# Patient Record
Sex: FEMALE | Race: WHITE | Employment: OTHER | URBAN - METROPOLITAN AREA
[De-identification: names, ages, dates, MRNs, and addresses within clinical notes are randomized per-mention and may not be internally consistent; named-entity substitution may affect disease eponyms.]

---

## 2017-01-04 ENCOUNTER — OFFICE VISIT (OUTPATIENT)
Dept: INTERNAL MEDICINE CLINIC | Facility: CLINIC | Age: 82
End: 2017-01-04

## 2017-01-04 VITALS
BODY MASS INDEX: 23 KG/M2 | DIASTOLIC BLOOD PRESSURE: 86 MMHG | HEART RATE: 84 BPM | WEIGHT: 132 LBS | SYSTOLIC BLOOD PRESSURE: 170 MMHG | TEMPERATURE: 98 F | OXYGEN SATURATION: 97 %

## 2017-01-04 DIAGNOSIS — I25.10 ASHD (ARTERIOSCLEROTIC HEART DISEASE): Primary | ICD-10-CM

## 2017-01-04 DIAGNOSIS — Z79.4 TYPE 2 DIABETES MELLITUS WITHOUT COMPLICATION, WITH LONG-TERM CURRENT USE OF INSULIN (HCC): ICD-10-CM

## 2017-01-04 DIAGNOSIS — Z95.1 S/P CABG X 3: ICD-10-CM

## 2017-01-04 DIAGNOSIS — D64.9 ANEMIA, UNSPECIFIED TYPE: ICD-10-CM

## 2017-01-04 DIAGNOSIS — E11.9 TYPE 2 DIABETES MELLITUS WITHOUT COMPLICATION, WITH LONG-TERM CURRENT USE OF INSULIN (HCC): ICD-10-CM

## 2017-01-04 DIAGNOSIS — E78.00 HYPERCHOLESTEREMIA: ICD-10-CM

## 2017-01-04 DIAGNOSIS — K21.9 GASTROESOPHAGEAL REFLUX DISEASE WITHOUT ESOPHAGITIS: ICD-10-CM

## 2017-01-04 DIAGNOSIS — I10 ESSENTIAL HYPERTENSION: ICD-10-CM

## 2017-01-04 DIAGNOSIS — M15.9 PRIMARY OSTEOARTHRITIS INVOLVING MULTIPLE JOINTS: ICD-10-CM

## 2017-01-04 DIAGNOSIS — R53.83 FATIGUE, UNSPECIFIED TYPE: ICD-10-CM

## 2017-01-04 PROCEDURE — G0463 HOSPITAL OUTPT CLINIC VISIT: HCPCS | Performed by: INTERNAL MEDICINE

## 2017-01-04 PROCEDURE — 99214 OFFICE O/P EST MOD 30 MIN: CPT | Performed by: INTERNAL MEDICINE

## 2017-01-04 RX ORDER — GLIMEPIRIDE 4 MG/1
4 TABLET ORAL
Qty: 30 TABLET | Refills: 11 | Status: SHIPPED | OUTPATIENT
Start: 2017-01-04 | End: 2017-02-03 | Stop reason: DRUGHIGH

## 2017-01-04 RX ORDER — MELATONIN
325
Qty: 30 TABLET | Refills: 3 | Status: SHIPPED | OUTPATIENT
Start: 2017-01-04 | End: 2017-02-03

## 2017-01-04 NOTE — PATIENT INSTRUCTIONS
1.  Patient is to continue her current diet, medications and activity. 2.  Patient is to continue her lisinopril and Cardizem as recommended by Dr. Mena Reddy. 3.  Patient is to decrease her ferrous sulfate to 1 tablet daily.   4.  Patient to continue to inc

## 2017-01-04 NOTE — PROGRESS NOTES
Laine Luna is a 80year old female. Patient presents with:  Post-Op: 1 mo f/u  Ashd  Diabetes    HPI:   Patient presents with:  Post-Op: 1 mo f/u  Ashd  Diabetes    Patient is gradually improving following her recent aorto-coronary bypass operation. Rfl: 5   LEVOTHYROXINE SODIUM 50 MCG Oral Tab TAKE 1 TABLET (50MCG) BY MOUTH EVERY DAY Disp: 90 tablet Rfl: 3   Bepotastine Besilate 1.5 % Ophthalmic Solution Apply 1 drop to eye as needed.    Disp:  Rfl:    Blood Gluc Meter Disp-Strips Does not apply Devic bleeding 2014   • Neuropathy 06-   • Onychogryphosis 06-   • Esophageal reflux    • Heart attack ([facility]    • High cholesterol    • High blood pressure       Social History:    Smoking Status: Never Smoker                      Alcohol Use: No long-term current use of insulin (HCC)  Doing well.  CPM.  Patient's recent FBS was 180. Her FBS from earlier in the month was 92 with a hemoglobin A1c of 6.0. She is currently taking 10 units of Lantus insulin and 4 mg of glimepiride.   We will continue

## 2017-01-05 ENCOUNTER — TELEPHONE (OUTPATIENT)
Dept: INTERNAL MEDICINE CLINIC | Facility: CLINIC | Age: 82
End: 2017-01-05

## 2017-01-05 NOTE — TELEPHONE ENCOUNTER
Calling for blood pressure parameters from Dr Carroll Double - when does he want to be notified?   Her BP today is diastolic is 90 - systolic  625     Danelle at [facility] [phone]

## 2017-01-06 NOTE — TELEPHONE ENCOUNTER
I left a message for the visiting nurse, Erwin Alcazar. I have recommended that she notify me if the blood pressure is 175 or 961 or above systolic and 70/110 diastolic.

## 2017-01-18 ENCOUNTER — TELEPHONE (OUTPATIENT)
Dept: INTERNAL MEDICINE CLINIC | Facility: CLINIC | Age: 82
End: 2017-01-18

## 2017-01-18 NOTE — TELEPHONE ENCOUNTER
To DR ROGELIO NOVA  Pt states she was distracted when giving insulin this am - looks like she only got 3 units- because the pen was empty  rec that she take 7 more units from a new pen  Eat breakfast and take other meds as directed   Any problems call the ofc

## 2017-01-18 NOTE — TELEPHONE ENCOUNTER
Pt sent note for Dr Vinh Crowell review. Note includes medication notes following pt appt with Dr Tila Kingsley.   Pt has questions about some changes  Note placed in Dr Miles Christy in-box  Tasked to nursing

## 2017-01-19 RX ORDER — INSULIN GLARGINE 100 [IU]/ML
INJECTION, SOLUTION SUBCUTANEOUS
Qty: 15 ML | Refills: 3 | Status: SHIPPED | OUTPATIENT
Start: 2017-01-19 | End: 2018-04-28

## 2017-01-19 NOTE — TELEPHONE ENCOUNTER
I attempted to call the patient back twice this evening. The phone rang with no answer. I will attempt to call the patient tomorrow.

## 2017-01-20 NOTE — TELEPHONE ENCOUNTER
Discussed with patient. I reviewed the note that she sent me. She has discussed this with Brigitte Elliott, our nurse. Patient feels that she has her medications straightened out of her talking to Brigitte Elliott. Patient will see me in approximately 2 weeks.   She will call

## 2017-01-27 RX ORDER — LEVOTHYROXINE SODIUM 0.05 MG/1
TABLET ORAL
Qty: 90 TABLET | Refills: 3 | Status: SHIPPED | OUTPATIENT
Start: 2017-01-27 | End: 2017-04-24

## 2017-01-27 NOTE — TELEPHONE ENCOUNTER
Pt needs a refill Levothyroxine 50 mcg, pt is concerned she only has 5 pills left, send to [place].     Tasked to rx

## 2017-01-28 NOTE — TELEPHONE ENCOUNTER
Noted. Patient's TSH was 4.67 and October 11, 2016. I have sent a refill for the patient's Synthroid to her pharmacy as requested. I have given her 90 pills with 3 refills.

## 2017-01-30 ENCOUNTER — LAB ENCOUNTER (OUTPATIENT)
Dept: LAB | Age: 82
End: 2017-01-30
Attending: INTERNAL MEDICINE
Payer: MEDICARE

## 2017-01-30 DIAGNOSIS — E11.9 TYPE 2 DIABETES MELLITUS WITHOUT COMPLICATION, WITH LONG-TERM CURRENT USE OF INSULIN (HCC): ICD-10-CM

## 2017-01-30 DIAGNOSIS — D64.9 ANEMIA, UNSPECIFIED TYPE: ICD-10-CM

## 2017-01-30 DIAGNOSIS — E78.00 HYPERCHOLESTEREMIA: ICD-10-CM

## 2017-01-30 DIAGNOSIS — Z79.4 TYPE 2 DIABETES MELLITUS WITHOUT COMPLICATION, WITH LONG-TERM CURRENT USE OF INSULIN (HCC): ICD-10-CM

## 2017-01-30 DIAGNOSIS — R53.83 FATIGUE, UNSPECIFIED TYPE: ICD-10-CM

## 2017-01-30 LAB
ALT SERPL-CCNC: 16 U/L (ref 14–54)
ANION GAP SERPL CALC-SCNC: 7 MMOL/L (ref 0–18)
AST SERPL-CCNC: 24 U/L (ref 15–41)
BASOPHILS # BLD: 0.1 K/UL (ref 0–0.2)
BASOPHILS NFR BLD: 1 %
BUN SERPL-MCNC: 18 MG/DL (ref 8–20)
BUN/CREAT SERPL: 18 (ref 10–20)
CALCIUM SERPL-MCNC: 9.3 MG/DL (ref 8.5–10.5)
CHLORIDE SERPL-SCNC: 105 MMOL/L (ref 95–110)
CHOLEST SERPL-MCNC: 177 MG/DL (ref 110–200)
CO2 SERPL-SCNC: 32 MMOL/L (ref 22–32)
CREAT SERPL-MCNC: 1 MG/DL (ref 0.5–1.5)
EOSINOPHIL # BLD: 0.4 K/UL (ref 0–0.7)
EOSINOPHIL NFR BLD: 8 %
ERYTHROCYTE [DISTWIDTH] IN BLOOD BY AUTOMATED COUNT: 14.1 % (ref 11–15)
GLUCOSE SERPL-MCNC: 128 MG/DL (ref 70–99)
HBA1C MFR BLD: 6.5 % (ref 4–6)
HCT VFR BLD AUTO: 40.5 % (ref 35–48)
HDLC SERPL-MCNC: 52 MG/DL
HGB BLD-MCNC: 13.4 G/DL (ref 12–16)
LDLC SERPL CALC-MCNC: 113 MG/DL (ref 0–99)
LYMPHOCYTES # BLD: 1.4 K/UL (ref 1–4)
LYMPHOCYTES NFR BLD: 27 %
MCH RBC QN AUTO: 30.3 PG (ref 27–32)
MCHC RBC AUTO-ENTMCNC: 33.2 G/DL (ref 32–37)
MCV RBC AUTO: 91.2 FL (ref 80–100)
MONOCYTES # BLD: 0.4 K/UL (ref 0–1)
MONOCYTES NFR BLD: 7 %
NEUTROPHILS # BLD AUTO: 2.9 K/UL (ref 1.8–7.7)
NEUTROPHILS NFR BLD: 57 %
NONHDLC SERPL-MCNC: 125 MG/DL
OSMOLALITY UR CALC.SUM OF ELEC: 302 MOSM/KG (ref 275–295)
PLATELET # BLD AUTO: 131 K/UL (ref 140–400)
PMV BLD AUTO: 9.2 FL (ref 7.4–10.3)
POTASSIUM SERPL-SCNC: 4.2 MMOL/L (ref 3.3–5.1)
RBC # BLD AUTO: 4.44 M/UL (ref 3.7–5.4)
SODIUM SERPL-SCNC: 144 MMOL/L (ref 136–144)
TRIGL SERPL-MCNC: 58 MG/DL (ref 1–149)
WBC # BLD AUTO: 5.1 K/UL (ref 4–11)

## 2017-01-30 PROCEDURE — 85025 COMPLETE CBC W/AUTO DIFF WBC: CPT

## 2017-01-30 PROCEDURE — 80061 LIPID PANEL: CPT

## 2017-01-30 PROCEDURE — 84450 TRANSFERASE (AST) (SGOT): CPT

## 2017-01-30 PROCEDURE — 80048 BASIC METABOLIC PNL TOTAL CA: CPT

## 2017-01-30 PROCEDURE — 36415 COLL VENOUS BLD VENIPUNCTURE: CPT

## 2017-01-30 PROCEDURE — 83036 HEMOGLOBIN GLYCOSYLATED A1C: CPT

## 2017-01-30 PROCEDURE — 84460 ALANINE AMINO (ALT) (SGPT): CPT

## 2017-02-01 ENCOUNTER — TELEPHONE (OUTPATIENT)
Dept: INTERNAL MEDICINE CLINIC | Facility: CLINIC | Age: 82
End: 2017-02-01

## 2017-02-01 ENCOUNTER — HOSPITAL ENCOUNTER (EMERGENCY)
Facility: HOSPITAL | Age: 82
Discharge: HOME | End: 2017-02-01
Payer: MEDICARE

## 2017-02-01 ENCOUNTER — APPOINTMENT (OUTPATIENT)
Dept: GENERAL RADIOLOGY | Facility: HOSPITAL | Age: 82
End: 2017-02-01
Payer: MEDICARE

## 2017-02-01 VITALS
DIASTOLIC BLOOD PRESSURE: 93 MMHG | HEIGHT: 64 IN | RESPIRATION RATE: 16 BRPM | HEART RATE: 85 BPM | WEIGHT: 130 LBS | OXYGEN SATURATION: 97 % | TEMPERATURE: 98 F | BODY MASS INDEX: 22.2 KG/M2 | SYSTOLIC BLOOD PRESSURE: 181 MMHG

## 2017-02-01 DIAGNOSIS — K59.00 CONSTIPATION, UNSPECIFIED CONSTIPATION TYPE: Primary | ICD-10-CM

## 2017-02-01 PROCEDURE — 74020 XR ABDOMEN MINIMUM 2 VIEWS (CPT=74020): CPT

## 2017-02-01 PROCEDURE — 99283 EMERGENCY DEPT VISIT LOW MDM: CPT

## 2017-02-01 RX ORDER — POLYETHYLENE GLYCOL 3350 17 G/17G
17 POWDER, FOR SOLUTION ORAL DAILY PRN
Qty: 12 EACH | Refills: 0 | Status: SHIPPED | OUTPATIENT
Start: 2017-02-01 | End: 2017-03-03 | Stop reason: ALTCHOICE

## 2017-02-01 NOTE — TELEPHONE ENCOUNTER
Pt calling to speak to a nurse she is constipated and hasn't had a full movement in a few weeks , pt is in a lot of pain, please call to discuss she has an maria del rosario 2/3 but can't wait [phone]    Tasked to nursing high

## 2017-02-01 NOTE — ED INITIAL ASSESSMENT (HPI)
Pt reports having \"bowel issues\" she reports taking a stool softener and also takes iron but hasn't had a \"real BM in the past week\" denies blood in the stool or pain at this time

## 2017-02-01 NOTE — TELEPHONE ENCOUNTER
Noted.  I called patient's telephone and left a message. I believe it was the correct thing for her to go to emergency room for evaluation since is been an ongoing problem for at least the last week or so.

## 2017-02-01 NOTE — ED NOTES
Received pt into er 24 via w/c from triage and x-ray department for evaluation of constipation: reports last BM 1 week ago but has had problems since CABG in November 2016, denies abd pain, no vomiting or fevers.  Dr Jose Carlos Kirkland at bedside

## 2017-02-01 NOTE — ED NOTES
Discharge instructions reviewed with patient and  with verbalized understanding. Pt denies pain.  Pt assisted with getting dress, and discharge from ER via walker with this RN

## 2017-02-01 NOTE — ED PROVIDER NOTES
Patient Seen in: [facility] AND [facility] Emergency Department    History   Patient presents with:  Constipation (gastrointestinal)    Stated Complaint: constipation x 1 week    HPI    Patient is an 59-year-old female who underwent heart surgery in November.   Joe Mendez LUMPECTOMY RIGHT      CABG  11/11/2016       Medications :   PEG 3350 Oral Powd Pack,  Take 17 g by mouth daily as needed.    Levothyroxine Sodium 50 MCG Oral Tab,  TAKE 1 TABLET (50MCG) BY MOUTH EVERY DAY   LANTUS SOLOSTAR 100 UNIT/ML Subcutaneous Solut daily as directed by physcian. Multiple Vitamins-Minerals (CENTRUM SILVER) Oral Tab,  Take  by mouth. CENTRUM SILVER (unknown strength)   Pyridoxine HCl (VITAMIN B-6) 50 MG Oral Tab,  Take  by mouth.  1 tab daily       Family History   Problem Relation Ag mass. There is no tenderness. There is no rebound and no guarding. Rectal exam there is thick murky stool in the top of her rectal vault. Hemoccult negative. Musculoskeletal: Normal range of motion. Exhibits no edema or tenderness.    Lymphadenopathy:

## 2017-02-01 NOTE — TELEPHONE ENCOUNTER
Pt states she hasn't had a BM in \"weeks\", pt has been manually removing stool a little at a time, +urgency for BM, no abd pain, abd \"gurgling\", no n/v, no fever, stool is black, pt taking iron, pt has tried Milk of Magnesia, Miralax.  Advised pt to repo

## 2017-02-03 ENCOUNTER — OFFICE VISIT (OUTPATIENT)
Dept: INTERNAL MEDICINE CLINIC | Facility: CLINIC | Age: 82
End: 2017-02-03

## 2017-02-03 VITALS
DIASTOLIC BLOOD PRESSURE: 84 MMHG | HEIGHT: 64 IN | OXYGEN SATURATION: 95 % | BODY MASS INDEX: 22.2 KG/M2 | HEART RATE: 84 BPM | SYSTOLIC BLOOD PRESSURE: 150 MMHG | WEIGHT: 130 LBS | TEMPERATURE: 97 F

## 2017-02-03 DIAGNOSIS — E78.00 HYPERCHOLESTEREMIA: ICD-10-CM

## 2017-02-03 DIAGNOSIS — Z79.4 TYPE 2 DIABETES MELLITUS WITHOUT COMPLICATION, WITH LONG-TERM CURRENT USE OF INSULIN (HCC): ICD-10-CM

## 2017-02-03 DIAGNOSIS — R53.83 FATIGUE, UNSPECIFIED TYPE: ICD-10-CM

## 2017-02-03 DIAGNOSIS — K21.9 GASTROESOPHAGEAL REFLUX DISEASE WITHOUT ESOPHAGITIS: ICD-10-CM

## 2017-02-03 DIAGNOSIS — M15.9 PRIMARY OSTEOARTHRITIS INVOLVING MULTIPLE JOINTS: ICD-10-CM

## 2017-02-03 DIAGNOSIS — I25.10 ASHD (ARTERIOSCLEROTIC HEART DISEASE): Primary | ICD-10-CM

## 2017-02-03 DIAGNOSIS — K59.00 CONSTIPATION, UNSPECIFIED CONSTIPATION TYPE: ICD-10-CM

## 2017-02-03 DIAGNOSIS — Z95.1 S/P CABG X 3: ICD-10-CM

## 2017-02-03 DIAGNOSIS — Z79.4 TYPE 2 DIABETES MELLITUS WITH DIABETIC POLYNEUROPATHY, WITH LONG-TERM CURRENT USE OF INSULIN (HCC): ICD-10-CM

## 2017-02-03 DIAGNOSIS — E03.9 HYPOTHYROIDISM, UNSPECIFIED TYPE: ICD-10-CM

## 2017-02-03 DIAGNOSIS — E11.42 TYPE 2 DIABETES MELLITUS WITH DIABETIC POLYNEUROPATHY, WITH LONG-TERM CURRENT USE OF INSULIN (HCC): ICD-10-CM

## 2017-02-03 DIAGNOSIS — I10 ESSENTIAL HYPERTENSION: ICD-10-CM

## 2017-02-03 DIAGNOSIS — D64.9 ANEMIA, UNSPECIFIED TYPE: ICD-10-CM

## 2017-02-03 DIAGNOSIS — E11.9 TYPE 2 DIABETES MELLITUS WITHOUT COMPLICATION, WITH LONG-TERM CURRENT USE OF INSULIN (HCC): ICD-10-CM

## 2017-02-03 PROCEDURE — G0463 HOSPITAL OUTPT CLINIC VISIT: HCPCS | Performed by: INTERNAL MEDICINE

## 2017-02-03 PROCEDURE — 99214 OFFICE O/P EST MOD 30 MIN: CPT | Performed by: INTERNAL MEDICINE

## 2017-02-03 RX ORDER — GLIMEPIRIDE 2 MG/1
2 TABLET ORAL
Qty: 30 TABLET | Refills: 11 | Status: SHIPPED | OUTPATIENT
Start: 2017-02-03 | End: 2017-04-24

## 2017-02-03 RX ORDER — OMEPRAZOLE 20 MG/1
20 CAPSULE, DELAYED RELEASE ORAL DAILY
Qty: 90 CAPSULE | Refills: 3 | Status: SHIPPED | OUTPATIENT
Start: 2017-02-03 | End: 2017-04-24

## 2017-02-03 RX ORDER — POTASSIUM CHLORIDE 20 MEQ/1
20 TABLET, EXTENDED RELEASE ORAL DAILY
Qty: 90 TABLET | Refills: 1 | Status: SHIPPED | OUTPATIENT
Start: 2017-02-03 | End: 2017-08-09

## 2017-02-03 NOTE — PROGRESS NOTES
Madison Remy is a 80year old female. Patient presents with:  Checkup: 1 month. Pt recenlty went to ED on Wednesday for constipation, she received an enema, was advised to take Miralax.  Last BM at ED at the hospital. Pt was advised to stop iron but she 0   Vitamin C 500 MG Oral Tab Take 1 tablet (500 mg total) by mouth 3 (three) times daily. Disp: 30 tablet Rfl: 0   furosemide 40 MG Oral Tab Take 1 tablet (40 mg total) by mouth daily.  Disp: 30 tablet Rfl: 5   Bepotastine Besilate 1.5 % Ophthalmic Solutio localized, unspecified site    • Low blood potassium    • Colon polyps 2003   • Osteopenia 2004   • Abnormal EKG 2005   • Labyrinthitis    • Esophagitis    • Hypothyroid    • Lump on face 2013     on Left side of face.  Excision 2013   • Rectal bleeding 201 As above. 3. Type 2 diabetes mellitus without complication, with long-term current use of insulin ([address]  Doing well.  CPM.  As above. Patient's recent . Her hemoglobin A1c was 6.5.   Patient is very concerned about low blood sugars occurring at

## 2017-02-03 NOTE — PATIENT INSTRUCTIONS
1.  Patient is to continue her current diet, medications and activity. 2.  Patient can stop her iron tablet. 3.  Patient is to take MiraLAX every day to help prevent constipation.   4.  I will plan to see the patient back in 1 month with blood tests which

## 2017-02-06 ENCOUNTER — HOSPITAL ENCOUNTER (OUTPATIENT)
Dept: GENERAL RADIOLOGY | Facility: HOSPITAL | Age: 82
Discharge: HOME | End: 2017-02-06
Attending: INTERNAL MEDICINE
Payer: MEDICARE

## 2017-02-06 DIAGNOSIS — Z95.1 S/P CABG X 3: ICD-10-CM

## 2017-02-06 DIAGNOSIS — I25.10 ASHD (ARTERIOSCLEROTIC HEART DISEASE): ICD-10-CM

## 2017-02-06 PROCEDURE — 71020 XR CHEST PA + LAT CHEST (CPT=71020): CPT

## 2017-02-09 ENCOUNTER — TELEPHONE (OUTPATIENT)
Dept: INTERNAL MEDICINE CLINIC | Facility: CLINIC | Age: 82
End: 2017-02-09

## 2017-02-10 NOTE — TELEPHONE ENCOUNTER
Discussed with patient. Her repeat chest x-ray is improved. Her left diaphragm is slightly elevated. The previously noted pleural effusions are much improved or resolved. There is minimal bibasilar atelectasis.   The overall appearance of the chest x-ra

## 2017-02-14 ENCOUNTER — TELEPHONE (OUTPATIENT)
Dept: INTERNAL MEDICINE CLINIC | Facility: CLINIC | Age: 82
End: 2017-02-14

## 2017-02-14 RX ORDER — PRAVASTATIN SODIUM 40 MG
TABLET ORAL
Qty: 90 TABLET | Refills: 3 | Status: SHIPPED | OUTPATIENT
Start: 2017-02-14 | End: 2018-02-26

## 2017-02-23 ENCOUNTER — LAB ENCOUNTER (OUTPATIENT)
Dept: LAB | Age: 82
End: 2017-02-23
Attending: INTERNAL MEDICINE
Payer: MEDICARE

## 2017-02-23 ENCOUNTER — PRIOR ORIGINAL RECORDS (OUTPATIENT)
Dept: OTHER | Age: 82
End: 2017-02-23

## 2017-02-23 DIAGNOSIS — E11.9 TYPE 2 DIABETES MELLITUS WITHOUT COMPLICATION, WITH LONG-TERM CURRENT USE OF INSULIN (HCC): ICD-10-CM

## 2017-02-23 DIAGNOSIS — Z79.4 TYPE 2 DIABETES MELLITUS WITHOUT COMPLICATION, WITH LONG-TERM CURRENT USE OF INSULIN (HCC): ICD-10-CM

## 2017-02-23 DIAGNOSIS — D64.9 ANEMIA, UNSPECIFIED TYPE: ICD-10-CM

## 2017-02-23 DIAGNOSIS — E78.00 HYPERCHOLESTEREMIA: ICD-10-CM

## 2017-02-23 DIAGNOSIS — R53.83 FATIGUE, UNSPECIFIED TYPE: ICD-10-CM

## 2017-02-23 LAB
ALT SERPL-CCNC: 17 U/L (ref 14–54)
ANION GAP SERPL CALC-SCNC: 8 MMOL/L (ref 0–18)
AST SERPL-CCNC: 24 U/L (ref 15–41)
BASOPHILS # BLD: 0.1 K/UL (ref 0–0.2)
BASOPHILS NFR BLD: 1 %
BUN SERPL-MCNC: 14 MG/DL (ref 8–20)
BUN/CREAT SERPL: 14.6 (ref 10–20)
CALCIUM SERPL-MCNC: 9.6 MG/DL (ref 8.5–10.5)
CHLORIDE SERPL-SCNC: 102 MMOL/L (ref 95–110)
CHOLEST SERPL-MCNC: 178 MG/DL (ref 110–200)
CO2 SERPL-SCNC: 32 MMOL/L (ref 22–32)
CREAT SERPL-MCNC: 0.96 MG/DL (ref 0.5–1.5)
EOSINOPHIL # BLD: 0.4 K/UL (ref 0–0.7)
EOSINOPHIL NFR BLD: 9 %
ERYTHROCYTE [DISTWIDTH] IN BLOOD BY AUTOMATED COUNT: 14.3 % (ref 11–15)
GLUCOSE SERPL-MCNC: 139 MG/DL (ref 70–99)
HBA1C MFR BLD: 7.1 % (ref 4–6)
HCT VFR BLD AUTO: 42.3 % (ref 35–48)
HDLC SERPL-MCNC: 48 MG/DL
HGB BLD-MCNC: 13.8 G/DL (ref 12–16)
LDLC SERPL CALC-MCNC: 120 MG/DL (ref 0–99)
LYMPHOCYTES # BLD: 1.2 K/UL (ref 1–4)
LYMPHOCYTES NFR BLD: 26 %
MCH RBC QN AUTO: 29.8 PG (ref 27–32)
MCHC RBC AUTO-ENTMCNC: 32.7 G/DL (ref 32–37)
MCV RBC AUTO: 91.1 FL (ref 80–100)
MONOCYTES # BLD: 0.3 K/UL (ref 0–1)
MONOCYTES NFR BLD: 6 %
NEUTROPHILS # BLD AUTO: 2.7 K/UL (ref 1.8–7.7)
NEUTROPHILS NFR BLD: 57 %
NONHDLC SERPL-MCNC: 130 MG/DL
OSMOLALITY UR CALC.SUM OF ELEC: 297 MOSM/KG (ref 275–295)
PLATELET # BLD AUTO: 139 K/UL (ref 140–400)
PMV BLD AUTO: 10 FL (ref 7.4–10.3)
POTASSIUM SERPL-SCNC: 4.2 MMOL/L (ref 3.3–5.1)
RBC # BLD AUTO: 4.64 M/UL (ref 3.7–5.4)
SODIUM SERPL-SCNC: 142 MMOL/L (ref 136–144)
TRIGL SERPL-MCNC: 51 MG/DL (ref 1–149)
WBC # BLD AUTO: 4.8 K/UL (ref 4–11)

## 2017-02-23 PROCEDURE — 36415 COLL VENOUS BLD VENIPUNCTURE: CPT

## 2017-02-23 PROCEDURE — 84460 ALANINE AMINO (ALT) (SGPT): CPT

## 2017-02-23 PROCEDURE — 80061 LIPID PANEL: CPT

## 2017-02-23 PROCEDURE — 83036 HEMOGLOBIN GLYCOSYLATED A1C: CPT

## 2017-02-23 PROCEDURE — 84450 TRANSFERASE (AST) (SGOT): CPT

## 2017-02-23 PROCEDURE — 85025 COMPLETE CBC W/AUTO DIFF WBC: CPT

## 2017-02-23 PROCEDURE — 80048 BASIC METABOLIC PNL TOTAL CA: CPT

## 2017-02-28 ENCOUNTER — PRIOR ORIGINAL RECORDS (OUTPATIENT)
Dept: OTHER | Age: 82
End: 2017-02-28

## 2017-03-01 ENCOUNTER — TELEPHONE (OUTPATIENT)
Dept: INTERNAL MEDICINE CLINIC | Facility: CLINIC | Age: 82
End: 2017-03-01

## 2017-03-03 ENCOUNTER — OFFICE VISIT (OUTPATIENT)
Dept: INTERNAL MEDICINE CLINIC | Facility: CLINIC | Age: 82
End: 2017-03-03

## 2017-03-03 VITALS
HEIGHT: 64 IN | OXYGEN SATURATION: 98 % | TEMPERATURE: 98 F | BODY MASS INDEX: 22.33 KG/M2 | WEIGHT: 130.81 LBS | HEART RATE: 84 BPM | SYSTOLIC BLOOD PRESSURE: 156 MMHG | DIASTOLIC BLOOD PRESSURE: 86 MMHG

## 2017-03-03 DIAGNOSIS — M15.9 PRIMARY OSTEOARTHRITIS INVOLVING MULTIPLE JOINTS: ICD-10-CM

## 2017-03-03 DIAGNOSIS — Z79.4 TYPE 2 DIABETES MELLITUS WITH DIABETIC POLYNEUROPATHY, WITH LONG-TERM CURRENT USE OF INSULIN (HCC): ICD-10-CM

## 2017-03-03 DIAGNOSIS — R53.83 FATIGUE, UNSPECIFIED TYPE: ICD-10-CM

## 2017-03-03 DIAGNOSIS — Z95.1 S/P CABG X 3: ICD-10-CM

## 2017-03-03 DIAGNOSIS — E11.42 TYPE 2 DIABETES MELLITUS WITH DIABETIC POLYNEUROPATHY, WITH LONG-TERM CURRENT USE OF INSULIN (HCC): ICD-10-CM

## 2017-03-03 DIAGNOSIS — E03.9 HYPOTHYROIDISM, UNSPECIFIED TYPE: ICD-10-CM

## 2017-03-03 DIAGNOSIS — I25.10 ASHD (ARTERIOSCLEROTIC HEART DISEASE): Primary | ICD-10-CM

## 2017-03-03 DIAGNOSIS — I10 ESSENTIAL HYPERTENSION: ICD-10-CM

## 2017-03-03 DIAGNOSIS — K21.9 GASTROESOPHAGEAL REFLUX DISEASE WITHOUT ESOPHAGITIS: ICD-10-CM

## 2017-03-03 DIAGNOSIS — C50.911 MALIGNANT NEOPLASM OF RIGHT FEMALE BREAST, UNSPECIFIED SITE OF BREAST: ICD-10-CM

## 2017-03-03 DIAGNOSIS — N28.9 RENAL INSUFFICIENCY: ICD-10-CM

## 2017-03-03 DIAGNOSIS — K59.00 CONSTIPATION, UNSPECIFIED CONSTIPATION TYPE: ICD-10-CM

## 2017-03-03 DIAGNOSIS — G62.9 PERIPHERAL POLYNEUROPATHY: ICD-10-CM

## 2017-03-03 DIAGNOSIS — E78.00 HYPERCHOLESTEREMIA: ICD-10-CM

## 2017-03-03 LAB
ALT (SGPT): 17 U/L
AST (SGOT): 24 U/L
BUN: 14 MG/DL
CALCIUM: 9.6 MG/DL
CHLORIDE: 102 MEQ/L
CHOLESTEROL, TOTAL: 178 MG/DL
CREATININE, SERUM: 0.96 MG/DL
GLUCOSE: 139 MG/DL
GLUCOSE: 139 MG/DL
HDL CHOLESTEROL: 48 MG/DL
LDL CHOLESTEROL: 120 MG/DL
NON-HDL CHOLESTEROL: 130 MG/DL
POTASSIUM, SERUM: 4.2 MEQ/L
SGOT (AST): 24 IU/L
SGPT (ALT): 17 IU/L
SODIUM: 142 MEQ/L
TRIGLYCERIDES: 51 MG/DL

## 2017-03-03 PROCEDURE — G0463 HOSPITAL OUTPT CLINIC VISIT: HCPCS | Performed by: INTERNAL MEDICINE

## 2017-03-03 PROCEDURE — 99214 OFFICE O/P EST MOD 30 MIN: CPT | Performed by: INTERNAL MEDICINE

## 2017-03-03 NOTE — PATIENT INSTRUCTIONS
1.  Patient is to continue her current diet, medications and activity. 2.  Patient is to follow-up with her cardiologist, Dr. Simran Guzman.   3.  I will plan to see the patient back in about 1 month with blood tests which will include a CBC, BMP, and hemoglobin

## 2017-03-03 NOTE — PROGRESS NOTES
Omar Gibbons is a 80year old female. Patient presents with: Follow - Up: Medication questions, Would like to go over CXR results, patient is concerned with the adhesive tape for the monitor.    Ashd  Diabetes  Hypertension  Hyperlipidemia    HPI:   Pa mL Rfl: 3   aspirin 81 MG Oral Chew Tab Chew 1 tablet (81 mg total) by mouth daily. (Patient taking differently: Chew 162 mg by mouth daily.  ) Disp: 90 tablet Rfl: 0   Vitamin C 500 MG Oral Tab Take 1 tablet (500 mg total) by mouth 3 (three) times daily. Esophageal reflux    • Heart attack (HCC)    • High cholesterol    • High blood pressure       Social History:    Smoking Status: Never Smoker                      Alcohol Use: No                 REVIEW OF SYSTEMS:   GENERAL HEALTH: feels well otherwise  R rising. Her blood pressure today is doing well. It is slightly elevated. CPM.    5. Hypercholesteremia  Doing well.  CPM.  Patient's cholesterol readings 178, triglycerides of 51, HDL cholesterol is 48 and LDL cholesterol is 120.   Patient's AST is 24 an

## 2017-03-08 ENCOUNTER — PRIOR ORIGINAL RECORDS (OUTPATIENT)
Dept: OTHER | Age: 82
End: 2017-03-08

## 2017-03-10 ENCOUNTER — PRIOR ORIGINAL RECORDS (OUTPATIENT)
Dept: OTHER | Age: 82
End: 2017-03-10

## 2017-03-22 ENCOUNTER — PRIOR ORIGINAL RECORDS (OUTPATIENT)
Dept: OTHER | Age: 82
End: 2017-03-22

## 2017-03-22 ENCOUNTER — MYAURORA ACCOUNT LINK (OUTPATIENT)
Dept: OTHER | Age: 82
End: 2017-03-22

## 2017-03-24 ENCOUNTER — TELEPHONE (OUTPATIENT)
Dept: INTERNAL MEDICINE CLINIC | Facility: CLINIC | Age: 82
End: 2017-03-24

## 2017-03-24 RX ORDER — DILTIAZEM HYDROCHLORIDE 240 MG/1
240 CAPSULE, COATED, EXTENDED RELEASE ORAL DAILY
Qty: 90 CAPSULE | Refills: 3 | Status: SHIPPED | OUTPATIENT
Start: 2017-03-24 | End: 2018-03-06

## 2017-03-24 NOTE — TELEPHONE ENCOUNTER
I cannot find that this medication has ever been refilled by [facility] before.  To  to please review and advise on refill

## 2017-03-24 NOTE — TELEPHONE ENCOUNTER
Noted.  I have refilled the patient's medication as requested. Please notify the patient that this is been done. I will route this to nursing.   Thank you!!

## 2017-03-24 NOTE — TELEPHONE ENCOUNTER
Pt need a refill on Diltiazem 240 mg, 1 a day, QTY-90. Please send to [facility]. Tasked to Rx.

## 2017-03-29 ENCOUNTER — TELEPHONE (OUTPATIENT)
Dept: INTERNAL MEDICINE CLINIC | Facility: CLINIC | Age: 82
End: 2017-03-29

## 2017-03-29 NOTE — TELEPHONE ENCOUNTER
Patient was seen at the time of her 's office visit. She has a area of irritation between both buttocks. I will give her a prescription for Mycolog ointment that she can apply to the area twice a day as necessary.   She should wash the area twice a

## 2017-03-31 ENCOUNTER — MYAURORA ACCOUNT LINK (OUTPATIENT)
Dept: OTHER | Age: 82
End: 2017-03-31

## 2017-04-03 ENCOUNTER — PRIOR ORIGINAL RECORDS (OUTPATIENT)
Dept: OTHER | Age: 82
End: 2017-04-03

## 2017-04-10 ENCOUNTER — OFFICE VISIT (OUTPATIENT)
Dept: INTERNAL MEDICINE CLINIC | Facility: CLINIC | Age: 82
End: 2017-04-10

## 2017-04-10 ENCOUNTER — LAB ENCOUNTER (OUTPATIENT)
Dept: LAB | Age: 82
End: 2017-04-10
Attending: INTERNAL MEDICINE
Payer: MEDICARE

## 2017-04-10 VITALS
HEIGHT: 64 IN | HEART RATE: 64 BPM | BODY MASS INDEX: 22.81 KG/M2 | WEIGHT: 133.63 LBS | DIASTOLIC BLOOD PRESSURE: 60 MMHG | OXYGEN SATURATION: 98 % | SYSTOLIC BLOOD PRESSURE: 144 MMHG | TEMPERATURE: 98 F

## 2017-04-10 DIAGNOSIS — I10 ESSENTIAL HYPERTENSION: ICD-10-CM

## 2017-04-10 DIAGNOSIS — R21 RASH: ICD-10-CM

## 2017-04-10 DIAGNOSIS — R53.83 FATIGUE, UNSPECIFIED TYPE: ICD-10-CM

## 2017-04-10 DIAGNOSIS — E11.42 TYPE 2 DIABETES MELLITUS WITH DIABETIC POLYNEUROPATHY, WITH LONG-TERM CURRENT USE OF INSULIN (HCC): ICD-10-CM

## 2017-04-10 DIAGNOSIS — S30.810A BUTTOCK ABRASION, INITIAL ENCOUNTER: Primary | ICD-10-CM

## 2017-04-10 DIAGNOSIS — Z79.4 TYPE 2 DIABETES MELLITUS WITH DIABETIC POLYNEUROPATHY, WITH LONG-TERM CURRENT USE OF INSULIN (HCC): ICD-10-CM

## 2017-04-10 PROCEDURE — 99214 OFFICE O/P EST MOD 30 MIN: CPT | Performed by: INTERNAL MEDICINE

## 2017-04-10 PROCEDURE — 85025 COMPLETE CBC W/AUTO DIFF WBC: CPT

## 2017-04-10 PROCEDURE — 83036 HEMOGLOBIN GLYCOSYLATED A1C: CPT

## 2017-04-10 PROCEDURE — G0463 HOSPITAL OUTPT CLINIC VISIT: HCPCS | Performed by: INTERNAL MEDICINE

## 2017-04-10 PROCEDURE — 80048 BASIC METABOLIC PNL TOTAL CA: CPT

## 2017-04-10 PROCEDURE — 36415 COLL VENOUS BLD VENIPUNCTURE: CPT

## 2017-04-10 RX ORDER — CEPHALEXIN 250 MG/1
250 CAPSULE ORAL 4 TIMES DAILY
Qty: 40 CAPSULE | Refills: 0 | Status: SHIPPED | OUTPATIENT
Start: 2017-04-10 | End: 2017-04-20

## 2017-04-10 RX ORDER — LISINOPRIL 10 MG/1
10 TABLET ORAL DAILY
Refills: 0 | COMMUNITY
Start: 2017-04-04 | End: 2017-04-10

## 2017-04-10 RX ORDER — GLIMEPIRIDE 2 MG/1
2 TABLET ORAL DAILY
Refills: 10 | COMMUNITY
Start: 2017-03-24 | End: 2017-04-10

## 2017-04-10 NOTE — PATIENT INSTRUCTIONS
1.  Patient is to continue her current diet, medications and activity. 2.  Patient is to continue her wound care for the buttock rash which includes washing the area twice a day and after each bowel movement.   She is to continue to apply the Mycolog ointm

## 2017-04-10 NOTE — PROGRESS NOTES
Ronaldo Dickson is a 80year old female. Patient presents with:  Checkup: Rash, patient unsure of new dosage of lisinopril   Rash  Hypertension    HPI:   Patient presents with:  Checkup: Rash, patient unsure of new dosage of lisinopril   Rash  Hypertensio Vitamin C 500 MG Oral Tab Take 1 tablet (500 mg total) by mouth 3 (three) times daily. Disp: 30 tablet Rfl: 0   furosemide 40 MG Oral Tab Take 1 tablet (40 mg total) by mouth daily.  Disp: 30 tablet Rfl: 5   Bepotastine Besilate 1.5 % Ophthalmic Solution Esophageal reflux    • Heart attack (HCC)    • High cholesterol    • High blood pressure       Social History:    Smoking Status: Never Smoker                      Alcohol Use: No                 REVIEW OF SYSTEMS:   GENERAL HEALTH: feels well otherwise  R indicates understanding of these issues and agrees to the plan. The patient is asked to return in 2 weeks. Hardeep Cobian MD  4/10/2017  10:30 AM

## 2017-04-24 ENCOUNTER — OFFICE VISIT (OUTPATIENT)
Dept: INTERNAL MEDICINE CLINIC | Facility: CLINIC | Age: 82
End: 2017-04-24

## 2017-04-24 VITALS
SYSTOLIC BLOOD PRESSURE: 162 MMHG | OXYGEN SATURATION: 98 % | HEART RATE: 64 BPM | BODY MASS INDEX: 22.6 KG/M2 | WEIGHT: 132.38 LBS | DIASTOLIC BLOOD PRESSURE: 80 MMHG | HEIGHT: 64 IN | TEMPERATURE: 98 F

## 2017-04-24 DIAGNOSIS — C50.911 MALIGNANT NEOPLASM OF RIGHT FEMALE BREAST, UNSPECIFIED SITE OF BREAST: ICD-10-CM

## 2017-04-24 DIAGNOSIS — L98.491 SKIN ULCERATION, LIMITED TO BREAKDOWN OF SKIN (HCC): ICD-10-CM

## 2017-04-24 DIAGNOSIS — M15.9 PRIMARY OSTEOARTHRITIS INVOLVING MULTIPLE JOINTS: ICD-10-CM

## 2017-04-24 DIAGNOSIS — Z95.1 S/P CABG X 3: ICD-10-CM

## 2017-04-24 DIAGNOSIS — K21.9 GASTROESOPHAGEAL REFLUX DISEASE WITHOUT ESOPHAGITIS: ICD-10-CM

## 2017-04-24 DIAGNOSIS — E11.42 TYPE 2 DIABETES MELLITUS WITH DIABETIC POLYNEUROPATHY, WITH LONG-TERM CURRENT USE OF INSULIN (HCC): ICD-10-CM

## 2017-04-24 DIAGNOSIS — I10 ESSENTIAL HYPERTENSION: ICD-10-CM

## 2017-04-24 DIAGNOSIS — I25.10 ASHD (ARTERIOSCLEROTIC HEART DISEASE): Primary | ICD-10-CM

## 2017-04-24 DIAGNOSIS — G62.9 PERIPHERAL POLYNEUROPATHY: ICD-10-CM

## 2017-04-24 DIAGNOSIS — E03.9 HYPOTHYROIDISM, UNSPECIFIED TYPE: ICD-10-CM

## 2017-04-24 DIAGNOSIS — E78.00 HYPERCHOLESTEREMIA: ICD-10-CM

## 2017-04-24 DIAGNOSIS — N28.9 RENAL INSUFFICIENCY: ICD-10-CM

## 2017-04-24 DIAGNOSIS — Z79.4 TYPE 2 DIABETES MELLITUS WITH DIABETIC POLYNEUROPATHY, WITH LONG-TERM CURRENT USE OF INSULIN (HCC): ICD-10-CM

## 2017-04-24 DIAGNOSIS — K59.00 CONSTIPATION, UNSPECIFIED CONSTIPATION TYPE: ICD-10-CM

## 2017-04-24 DIAGNOSIS — R53.83 FATIGUE, UNSPECIFIED TYPE: ICD-10-CM

## 2017-04-24 PROBLEM — L98.499 SKIN ULCERATION (HCC): Status: ACTIVE | Noted: 2017-04-24

## 2017-04-24 PROCEDURE — G0463 HOSPITAL OUTPT CLINIC VISIT: HCPCS | Performed by: INTERNAL MEDICINE

## 2017-04-24 PROCEDURE — 99214 OFFICE O/P EST MOD 30 MIN: CPT | Performed by: INTERNAL MEDICINE

## 2017-04-24 RX ORDER — LEVOTHYROXINE SODIUM 0.05 MG/1
TABLET ORAL
Qty: 90 TABLET | Refills: 3 | Status: SHIPPED | OUTPATIENT
Start: 2017-04-24 | End: 2018-03-06

## 2017-04-24 RX ORDER — FUROSEMIDE 20 MG/1
20 TABLET ORAL DAILY
Qty: 20 TABLET | Refills: 0 | COMMUNITY
Start: 2017-04-24 | End: 2017-07-31

## 2017-04-24 RX ORDER — GLIMEPIRIDE 2 MG/1
2 TABLET ORAL
Qty: 90 TABLET | Refills: 3 | Status: SHIPPED | OUTPATIENT
Start: 2017-04-24 | End: 2018-03-06

## 2017-04-24 RX ORDER — OMEPRAZOLE 20 MG/1
20 CAPSULE, DELAYED RELEASE ORAL DAILY
Qty: 90 CAPSULE | Refills: 3 | Status: SHIPPED | OUTPATIENT
Start: 2017-04-24 | End: 2018-03-06

## 2017-04-24 RX ORDER — LISINOPRIL 10 MG/1
10 TABLET ORAL DAILY
COMMUNITY
End: 2017-07-31

## 2017-04-24 NOTE — PATIENT INSTRUCTIONS
1.  Patient is to continue her current diet, medications and activity. 2.  Patient is to continue her current pressure medications which include lisinopril, furosemide and diltiazem.   3.  Patient is to continue her current Lantus insulin and 2 mg of Amary

## 2017-04-24 NOTE — PROGRESS NOTES
Lee Oliver is a 80year old female. Patient presents with: Follow - Up: Medications pended. Ashd  Diabetes  Hypertension  Hyperlipidemia  Rash    HPI:   Patient presents with: Follow - Up: Medications pended.    Ashd  Diabetes  Hypertension  Hyper 90 tablet Rfl: 3   LANTUS SOLOSTAR 100 UNIT/ML Subcutaneous Solution Pen-injector INJECT 10 UNITS INTO THE SKIN EVERY MORNING Disp: 15 mL Rfl: 3   aspirin 81 MG Oral Chew Tab Chew 1 tablet (81 mg total) by mouth daily.  (Patient taking differently: Chew 162 2005   • Labyrinthitis    • Esophagitis    • Hypothyroid    • Lump on face 2013     on Left side of face.  Excision 2013   • Rectal bleeding 2014   • Neuropathy ([facility] 06-   • Onychogryphosis 06-   • Esophageal reflux    • Heart attack ([facility] fasting blood sugar was 188. Her hemoglobin A1c was 6.9. She is currently taking Lantus insulin 10 units daily. She is also taking Amaryl 2 mg orally daily. Patient is very concerned about having low blood sugars at night.   I have told her to try and t upper aspect of both buttocks appear to be superficial and clean. I have recommended that she wash the area 2-3 times a day with warm soapy water. She is to apply Neosporin or Polysporin ointment to the open areas.   I will see the patient back in 1 month

## 2017-04-26 ENCOUNTER — PRIOR ORIGINAL RECORDS (OUTPATIENT)
Dept: OTHER | Age: 82
End: 2017-04-26

## 2017-05-03 ENCOUNTER — PRIOR ORIGINAL RECORDS (OUTPATIENT)
Dept: OTHER | Age: 82
End: 2017-05-03

## 2017-05-23 ENCOUNTER — LAB ENCOUNTER (OUTPATIENT)
Dept: LAB | Age: 82
End: 2017-05-23
Attending: INTERNAL MEDICINE
Payer: MEDICARE

## 2017-05-23 ENCOUNTER — PRIOR ORIGINAL RECORDS (OUTPATIENT)
Dept: OTHER | Age: 82
End: 2017-05-23

## 2017-05-23 DIAGNOSIS — E11.42 TYPE 2 DIABETES MELLITUS WITH DIABETIC POLYNEUROPATHY, WITH LONG-TERM CURRENT USE OF INSULIN (HCC): ICD-10-CM

## 2017-05-23 DIAGNOSIS — N28.9 RENAL INSUFFICIENCY: ICD-10-CM

## 2017-05-23 DIAGNOSIS — Z79.4 TYPE 2 DIABETES MELLITUS WITH DIABETIC POLYNEUROPATHY, WITH LONG-TERM CURRENT USE OF INSULIN (HCC): ICD-10-CM

## 2017-05-23 DIAGNOSIS — R53.83 FATIGUE, UNSPECIFIED TYPE: ICD-10-CM

## 2017-05-23 DIAGNOSIS — E78.00 HYPERCHOLESTEREMIA: ICD-10-CM

## 2017-05-23 PROCEDURE — 36415 COLL VENOUS BLD VENIPUNCTURE: CPT

## 2017-05-23 PROCEDURE — 84460 ALANINE AMINO (ALT) (SGPT): CPT

## 2017-05-23 PROCEDURE — 80048 BASIC METABOLIC PNL TOTAL CA: CPT

## 2017-05-23 PROCEDURE — 85025 COMPLETE CBC W/AUTO DIFF WBC: CPT

## 2017-05-23 PROCEDURE — 80061 LIPID PANEL: CPT

## 2017-05-23 PROCEDURE — 84450 TRANSFERASE (AST) (SGOT): CPT

## 2017-05-23 PROCEDURE — 83036 HEMOGLOBIN GLYCOSYLATED A1C: CPT

## 2017-05-31 ENCOUNTER — OFFICE VISIT (OUTPATIENT)
Dept: INTERNAL MEDICINE CLINIC | Facility: CLINIC | Age: 82
End: 2017-05-31

## 2017-05-31 VITALS
HEART RATE: 72 BPM | HEIGHT: 64 IN | BODY MASS INDEX: 23.18 KG/M2 | SYSTOLIC BLOOD PRESSURE: 140 MMHG | DIASTOLIC BLOOD PRESSURE: 70 MMHG | OXYGEN SATURATION: 98 % | WEIGHT: 135.81 LBS | TEMPERATURE: 98 F

## 2017-05-31 DIAGNOSIS — Z79.4 TYPE 2 DIABETES MELLITUS WITH DIABETIC POLYNEUROPATHY, WITH LONG-TERM CURRENT USE OF INSULIN (HCC): ICD-10-CM

## 2017-05-31 DIAGNOSIS — I25.10 ASHD (ARTERIOSCLEROTIC HEART DISEASE): Primary | ICD-10-CM

## 2017-05-31 DIAGNOSIS — E11.42 TYPE 2 DIABETES MELLITUS WITH DIABETIC POLYNEUROPATHY, WITH LONG-TERM CURRENT USE OF INSULIN (HCC): ICD-10-CM

## 2017-05-31 DIAGNOSIS — N28.9 RENAL INSUFFICIENCY: ICD-10-CM

## 2017-05-31 DIAGNOSIS — K59.00 CONSTIPATION, UNSPECIFIED CONSTIPATION TYPE: ICD-10-CM

## 2017-05-31 DIAGNOSIS — Z95.1 S/P CABG X 3: ICD-10-CM

## 2017-05-31 DIAGNOSIS — R53.83 FATIGUE, UNSPECIFIED TYPE: ICD-10-CM

## 2017-05-31 DIAGNOSIS — I10 ESSENTIAL HYPERTENSION: ICD-10-CM

## 2017-05-31 DIAGNOSIS — K21.9 GASTROESOPHAGEAL REFLUX DISEASE WITHOUT ESOPHAGITIS: ICD-10-CM

## 2017-05-31 DIAGNOSIS — E03.9 HYPOTHYROIDISM, UNSPECIFIED TYPE: ICD-10-CM

## 2017-05-31 DIAGNOSIS — C50.911 MALIGNANT NEOPLASM OF RIGHT FEMALE BREAST, UNSPECIFIED ESTROGEN RECEPTOR STATUS, UNSPECIFIED SITE OF BREAST (HCC): ICD-10-CM

## 2017-05-31 DIAGNOSIS — E78.00 HYPERCHOLESTEREMIA: ICD-10-CM

## 2017-05-31 DIAGNOSIS — M15.9 PRIMARY OSTEOARTHRITIS INVOLVING MULTIPLE JOINTS: ICD-10-CM

## 2017-05-31 PROCEDURE — 99214 OFFICE O/P EST MOD 30 MIN: CPT | Performed by: INTERNAL MEDICINE

## 2017-05-31 PROCEDURE — G0463 HOSPITAL OUTPT CLINIC VISIT: HCPCS | Performed by: INTERNAL MEDICINE

## 2017-05-31 NOTE — PATIENT INSTRUCTIONS
1.  Patient is to continue her current diet, medications and activity. 2.  I will plan to see the patient back in 2 months with blood tests which will include a CBC, BMP, hemoglobin A1c, lipid panel, AST and ALT.   3.  I will see the patient back sooner as

## 2017-05-31 NOTE — PROGRESS NOTES
Ritta Leventhal is a 80year old female. Patient presents with: Follow - Up: 1 month  Blood Pressure: Patient brought in BP reading log. Has BP taken by nurse a couple times a week. Diabetes  Hyperlipidemia    HPI:   Patient presents with:   Follow - Up: 500 MG Oral Tab Take 1 tablet (500 mg total) by mouth 3 (three) times daily. (Patient taking differently: Take 500 mg by mouth daily.  ) Disp: 30 tablet Rfl: 0   Bepotastine Besilate 1.5 % Ophthalmic Solution Apply 1 drop to eye as needed.    Disp:  Rfl: Never Used                        Alcohol Use: No                 REVIEW OF SYSTEMS:   GENERAL HEALTH: feels well otherwise  RESPIRATORY:No cough or SOB  CARDIOVASCULAR: No chest pain  GI: No abdominal pain, nausea, vomiting, diarrhea, or constipation  : well.  CPM.    7. Primary osteoarthritis involving multiple joints  Doing well.  CPM.    8. Hypothyroidism, unspecified type  Stable. CPM.    9. Constipation, unspecified constipation type  Stable.   CPM.    10. Renal insufficiency  Doing well.  CPM.  Elizabeth

## 2017-06-13 LAB
ALT (SGPT): 21 U/L
AST (SGOT): 26 U/L
BUN: 17 MG/DL
CALCIUM: 9.3 MG/DL
CHLORIDE: 107 MEQ/L
CHOLESTEROL, TOTAL: 176 MG/DL
CREATININE, SERUM: 0.74 MG/DL
GLUCOSE: 140 MG/DL
GLUCOSE: 140 MG/DL
HDL CHOLESTEROL: 54 MG/DL
HEMATOCRIT: 39.8 %
HEMOGLOBIN: 13.3 G/DL
LDL CHOLESTEROL: 113 MG/DL
MCH: 31.7 PG
MCHC: 33.5 G/DL
MCV: 94.6 FL
NON-HDL CHOLESTEROL: 122 MG/DL
PLATELETS: 138 K/UL
POTASSIUM, SERUM: 4.5 MEQ/L
RED BLOOD COUNT: 4.21 X 10-6/U
SGOT (AST): 26 IU/L
SGPT (ALT): 21 IU/L
SODIUM: 142 MEQ/L
TRIGLYCERIDES: 47 MG/DL
WHITE BLOOD COUNT: 5 X 10-3/U

## 2017-06-14 ENCOUNTER — PRIOR ORIGINAL RECORDS (OUTPATIENT)
Dept: OTHER | Age: 82
End: 2017-06-14

## 2017-07-20 ENCOUNTER — LAB ENCOUNTER (OUTPATIENT)
Dept: LAB | Age: 82
End: 2017-07-20
Attending: INTERNAL MEDICINE
Payer: MEDICARE

## 2017-07-20 ENCOUNTER — PRIOR ORIGINAL RECORDS (OUTPATIENT)
Dept: OTHER | Age: 82
End: 2017-07-20

## 2017-07-20 DIAGNOSIS — N28.9 RENAL INSUFFICIENCY: ICD-10-CM

## 2017-07-20 DIAGNOSIS — Z79.4 TYPE 2 DIABETES MELLITUS WITH DIABETIC POLYNEUROPATHY, WITH LONG-TERM CURRENT USE OF INSULIN (HCC): ICD-10-CM

## 2017-07-20 DIAGNOSIS — E78.00 HYPERCHOLESTEREMIA: ICD-10-CM

## 2017-07-20 DIAGNOSIS — R53.83 FATIGUE, UNSPECIFIED TYPE: ICD-10-CM

## 2017-07-20 DIAGNOSIS — E11.42 TYPE 2 DIABETES MELLITUS WITH DIABETIC POLYNEUROPATHY, WITH LONG-TERM CURRENT USE OF INSULIN (HCC): ICD-10-CM

## 2017-07-20 LAB
ALT SERPL-CCNC: 19 U/L (ref 14–54)
ANION GAP SERPL CALC-SCNC: 7 MMOL/L (ref 0–18)
AST SERPL-CCNC: 26 U/L (ref 15–41)
BASOPHILS # BLD: 0 K/UL (ref 0–0.2)
BASOPHILS NFR BLD: 1 %
BUN SERPL-MCNC: 12 MG/DL (ref 8–20)
BUN/CREAT SERPL: 14.1 (ref 10–20)
CALCIUM SERPL-MCNC: 9.2 MG/DL (ref 8.5–10.5)
CHLORIDE SERPL-SCNC: 103 MMOL/L (ref 95–110)
CHOLEST SERPL-MCNC: 175 MG/DL (ref 110–200)
CO2 SERPL-SCNC: 28 MMOL/L (ref 22–32)
CREAT SERPL-MCNC: 0.85 MG/DL (ref 0.5–1.5)
EOSINOPHIL # BLD: 0.8 K/UL (ref 0–0.7)
EOSINOPHIL NFR BLD: 14 %
ERYTHROCYTE [DISTWIDTH] IN BLOOD BY AUTOMATED COUNT: 12.5 % (ref 11–15)
GLUCOSE SERPL-MCNC: 173 MG/DL (ref 70–99)
HBA1C MFR BLD: 7.1 % (ref 4–6)
HCT VFR BLD AUTO: 40.3 % (ref 35–48)
HDLC SERPL-MCNC: 52 MG/DL
HGB BLD-MCNC: 13.5 G/DL (ref 12–16)
LDLC SERPL CALC-MCNC: 112 MG/DL (ref 0–99)
LYMPHOCYTES # BLD: 1.5 K/UL (ref 1–4)
LYMPHOCYTES NFR BLD: 25 %
MCH RBC QN AUTO: 31.2 PG (ref 27–32)
MCHC RBC AUTO-ENTMCNC: 33.5 G/DL (ref 32–37)
MCV RBC AUTO: 93 FL (ref 80–100)
MONOCYTES # BLD: 0.4 K/UL (ref 0–1)
MONOCYTES NFR BLD: 6 %
NEUTROPHILS # BLD AUTO: 3.3 K/UL (ref 1.8–7.7)
NEUTROPHILS NFR BLD: 54 %
NONHDLC SERPL-MCNC: 123 MG/DL
OSMOLALITY UR CALC.SUM OF ELEC: 290 MOSM/KG (ref 275–295)
PLATELET # BLD AUTO: 150 K/UL (ref 140–400)
PMV BLD AUTO: 9.9 FL (ref 7.4–10.3)
POTASSIUM SERPL-SCNC: 3.9 MMOL/L (ref 3.3–5.1)
RBC # BLD AUTO: 4.34 M/UL (ref 3.7–5.4)
SODIUM SERPL-SCNC: 138 MMOL/L (ref 136–144)
TRIGL SERPL-MCNC: 54 MG/DL (ref 1–149)
WBC # BLD AUTO: 6 K/UL (ref 4–11)

## 2017-07-20 PROCEDURE — 84450 TRANSFERASE (AST) (SGOT): CPT

## 2017-07-20 PROCEDURE — 36415 COLL VENOUS BLD VENIPUNCTURE: CPT

## 2017-07-20 PROCEDURE — 85025 COMPLETE CBC W/AUTO DIFF WBC: CPT

## 2017-07-20 PROCEDURE — 83036 HEMOGLOBIN GLYCOSYLATED A1C: CPT

## 2017-07-20 PROCEDURE — 84460 ALANINE AMINO (ALT) (SGPT): CPT

## 2017-07-20 PROCEDURE — 80061 LIPID PANEL: CPT

## 2017-07-20 PROCEDURE — 80048 BASIC METABOLIC PNL TOTAL CA: CPT

## 2017-07-31 ENCOUNTER — OFFICE VISIT (OUTPATIENT)
Dept: INTERNAL MEDICINE CLINIC | Facility: CLINIC | Age: 82
End: 2017-07-31

## 2017-07-31 VITALS
HEIGHT: 64 IN | DIASTOLIC BLOOD PRESSURE: 70 MMHG | WEIGHT: 140.38 LBS | BODY MASS INDEX: 23.97 KG/M2 | HEART RATE: 76 BPM | SYSTOLIC BLOOD PRESSURE: 170 MMHG | OXYGEN SATURATION: 97 % | TEMPERATURE: 98 F

## 2017-07-31 DIAGNOSIS — M15.9 PRIMARY OSTEOARTHRITIS INVOLVING MULTIPLE JOINTS: ICD-10-CM

## 2017-07-31 DIAGNOSIS — E78.00 HYPERCHOLESTEREMIA: ICD-10-CM

## 2017-07-31 DIAGNOSIS — K21.9 GASTROESOPHAGEAL REFLUX DISEASE WITHOUT ESOPHAGITIS: ICD-10-CM

## 2017-07-31 DIAGNOSIS — Z79.4 TYPE 2 DIABETES MELLITUS WITH DIABETIC POLYNEUROPATHY, WITH LONG-TERM CURRENT USE OF INSULIN (HCC): ICD-10-CM

## 2017-07-31 DIAGNOSIS — E11.42 TYPE 2 DIABETES MELLITUS WITH DIABETIC POLYNEUROPATHY, WITH LONG-TERM CURRENT USE OF INSULIN (HCC): ICD-10-CM

## 2017-07-31 DIAGNOSIS — K59.00 CONSTIPATION, UNSPECIFIED CONSTIPATION TYPE: ICD-10-CM

## 2017-07-31 DIAGNOSIS — I25.10 ASHD (ARTERIOSCLEROTIC HEART DISEASE): Primary | ICD-10-CM

## 2017-07-31 DIAGNOSIS — I10 ESSENTIAL HYPERTENSION: ICD-10-CM

## 2017-07-31 DIAGNOSIS — Z00.00 ANNUAL PHYSICAL EXAM: ICD-10-CM

## 2017-07-31 DIAGNOSIS — E03.9 HYPOTHYROIDISM, UNSPECIFIED TYPE: ICD-10-CM

## 2017-07-31 DIAGNOSIS — C50.911 MALIGNANT NEOPLASM OF RIGHT FEMALE BREAST, UNSPECIFIED ESTROGEN RECEPTOR STATUS, UNSPECIFIED SITE OF BREAST (HCC): ICD-10-CM

## 2017-07-31 DIAGNOSIS — E55.9 VITAMIN D DEFICIENCY: ICD-10-CM

## 2017-07-31 DIAGNOSIS — Z95.1 S/P CABG X 3: ICD-10-CM

## 2017-07-31 DIAGNOSIS — N28.9 RENAL INSUFFICIENCY: ICD-10-CM

## 2017-07-31 PROCEDURE — G0463 HOSPITAL OUTPT CLINIC VISIT: HCPCS | Performed by: INTERNAL MEDICINE

## 2017-07-31 PROCEDURE — 99214 OFFICE O/P EST MOD 30 MIN: CPT | Performed by: INTERNAL MEDICINE

## 2017-07-31 RX ORDER — LISINOPRIL AND HYDROCHLOROTHIAZIDE 25; 20 MG/1; MG/1
1 TABLET ORAL 2 TIMES DAILY
Refills: 1 | COMMUNITY
Start: 2017-07-13 | End: 2020-09-17

## 2017-07-31 RX ORDER — ASCORBIC ACID 500 MG
500 TABLET ORAL DAILY
COMMUNITY
End: 2020-09-02

## 2017-07-31 NOTE — PROGRESS NOTES
Nicole Kraus is a 80year old female. Patient presents with: Follow - Up: 2 month follow up.  Dr Sherine Thornton changed lisinopril to lisinopril-hctz  Wound: Painful left buttock wound  Urinary Frequency  Dietician: Patient reports difficulties with following Misc USE AS DIRECTED Disp: 100 each Rfl: 3   DilTIAZem HCl ER Coated Beads 240 MG Oral Capsule SR 24 Hr Take 1 capsule (240 mg total) by mouth daily.  Disp: 90 capsule Rfl: 3   Pravastatin Sodium 40 MG Oral Tab TAKE 1 TABLET (40MG) BY MOUTH AT BEDTIME Disp: Type II or unspecified type diabetes mellitus without mention of complication, not stated as uncontrolled    • Unspecified essential hypertension       Social History:  Smoking status: Never Smoker 7.1.  I will refer the patient to diabetic education for a refresher course for her diabetes mellitus mellitus. CPM.  As above. 4. Essential hypertension  Patient's blood pressure slightly elevated today. We will continue to follow this in the future.

## 2017-07-31 NOTE — PATIENT INSTRUCTIONS
1.  Patient is to continue her current diet, medications and activity. 2.  She is to watch her diet more closely. I will give her an order for a diabetic education refresher course to assist with her diabetes and her diet.   3.  I will refer the patient t

## 2017-08-09 ENCOUNTER — PRIOR ORIGINAL RECORDS (OUTPATIENT)
Dept: OTHER | Age: 82
End: 2017-08-09

## 2017-08-09 RX ORDER — POTASSIUM CHLORIDE 20 MEQ/1
TABLET, EXTENDED RELEASE ORAL
Qty: 90 TABLET | Refills: 1 | Status: SHIPPED | OUTPATIENT
Start: 2017-08-09 | End: 2018-04-05

## 2017-10-17 LAB
ALT (SGPT): 19 U/L
AST (SGOT): 26 U/L
BUN: 12 MG/DL
CALCIUM: 9.2 MG/DL
CHLORIDE: 103 MEQ/L
CHOLESTEROL, TOTAL: 175 MG/DL
CREATININE, SERUM: 0.85 MG/DL
GLUCOSE: 173 MG/DL
GLUCOSE: 173 MG/DL
HDL CHOLESTEROL: 52 MG/DL
HEMATOCRIT: 40.3 %
HEMOGLOBIN: 13.5 G/DL
LDL CHOLESTEROL: 112 MG/DL
NON-HDL CHOLESTEROL: 123 MG/DL
PLATELETS: 150 K/UL
POTASSIUM, SERUM: 3.9 MEQ/L
RED BLOOD COUNT: 4.34 X 10-6/U
SGOT (AST): 26 IU/L
SGPT (ALT): 19 IU/L
SODIUM: 138 MEQ/L
TRIGLYCERIDES: 54 MG/DL
WHITE BLOOD COUNT: 6 X 10-3/U

## 2017-10-18 ENCOUNTER — PRIOR ORIGINAL RECORDS (OUTPATIENT)
Dept: OTHER | Age: 82
End: 2017-10-18

## 2017-10-30 ENCOUNTER — LAB ENCOUNTER (OUTPATIENT)
Dept: LAB | Age: 82
End: 2017-10-30
Attending: INTERNAL MEDICINE
Payer: MEDICARE

## 2017-10-30 DIAGNOSIS — E11.42 TYPE 2 DIABETES MELLITUS WITH DIABETIC POLYNEUROPATHY, WITH LONG-TERM CURRENT USE OF INSULIN (HCC): ICD-10-CM

## 2017-10-30 DIAGNOSIS — Z79.4 TYPE 2 DIABETES MELLITUS WITH DIABETIC POLYNEUROPATHY, WITH LONG-TERM CURRENT USE OF INSULIN (HCC): ICD-10-CM

## 2017-10-30 DIAGNOSIS — E55.9 VITAMIN D DEFICIENCY: ICD-10-CM

## 2017-10-30 DIAGNOSIS — Z00.00 ANNUAL PHYSICAL EXAM: ICD-10-CM

## 2017-10-30 DIAGNOSIS — E03.9 HYPOTHYROIDISM, UNSPECIFIED TYPE: ICD-10-CM

## 2017-10-30 DIAGNOSIS — E78.00 HYPERCHOLESTEREMIA: ICD-10-CM

## 2017-10-30 PROCEDURE — 82306 VITAMIN D 25 HYDROXY: CPT

## 2017-10-30 PROCEDURE — 36415 COLL VENOUS BLD VENIPUNCTURE: CPT

## 2017-10-30 PROCEDURE — 83036 HEMOGLOBIN GLYCOSYLATED A1C: CPT

## 2017-10-30 PROCEDURE — 81001 URINALYSIS AUTO W/SCOPE: CPT

## 2017-10-30 PROCEDURE — 80061 LIPID PANEL: CPT

## 2017-10-30 PROCEDURE — 84443 ASSAY THYROID STIM HORMONE: CPT

## 2017-10-30 PROCEDURE — 80053 COMPREHEN METABOLIC PANEL: CPT

## 2017-10-30 PROCEDURE — 85025 COMPLETE CBC W/AUTO DIFF WBC: CPT

## 2017-11-02 ENCOUNTER — OFFICE VISIT (OUTPATIENT)
Dept: INTERNAL MEDICINE CLINIC | Facility: CLINIC | Age: 82
End: 2017-11-02

## 2017-11-02 VITALS
DIASTOLIC BLOOD PRESSURE: 66 MMHG | HEIGHT: 63.75 IN | BODY MASS INDEX: 24.2 KG/M2 | SYSTOLIC BLOOD PRESSURE: 130 MMHG | TEMPERATURE: 98 F | WEIGHT: 140 LBS | OXYGEN SATURATION: 97 % | HEART RATE: 72 BPM

## 2017-11-02 DIAGNOSIS — G62.9 PERIPHERAL POLYNEUROPATHY: ICD-10-CM

## 2017-11-02 DIAGNOSIS — E11.42 TYPE 2 DIABETES MELLITUS WITH DIABETIC POLYNEUROPATHY, WITH LONG-TERM CURRENT USE OF INSULIN (HCC): ICD-10-CM

## 2017-11-02 DIAGNOSIS — C50.911 MALIGNANT NEOPLASM OF RIGHT FEMALE BREAST, UNSPECIFIED ESTROGEN RECEPTOR STATUS, UNSPECIFIED SITE OF BREAST (HCC): ICD-10-CM

## 2017-11-02 DIAGNOSIS — Z00.00 ANNUAL PHYSICAL EXAM: Primary | ICD-10-CM

## 2017-11-02 DIAGNOSIS — Z95.1 S/P CABG X 3: ICD-10-CM

## 2017-11-02 DIAGNOSIS — E03.9 HYPOTHYROIDISM, UNSPECIFIED TYPE: ICD-10-CM

## 2017-11-02 DIAGNOSIS — R53.83 FATIGUE, UNSPECIFIED TYPE: ICD-10-CM

## 2017-11-02 DIAGNOSIS — K59.00 CONSTIPATION, UNSPECIFIED CONSTIPATION TYPE: ICD-10-CM

## 2017-11-02 DIAGNOSIS — M15.9 PRIMARY OSTEOARTHRITIS INVOLVING MULTIPLE JOINTS: ICD-10-CM

## 2017-11-02 DIAGNOSIS — Z79.4 TYPE 2 DIABETES MELLITUS WITH DIABETIC POLYNEUROPATHY, WITH LONG-TERM CURRENT USE OF INSULIN (HCC): ICD-10-CM

## 2017-11-02 DIAGNOSIS — I25.10 ASHD (ARTERIOSCLEROTIC HEART DISEASE): ICD-10-CM

## 2017-11-02 DIAGNOSIS — E78.00 HYPERCHOLESTEREMIA: ICD-10-CM

## 2017-11-02 DIAGNOSIS — N39.0 URINARY TRACT INFECTION WITHOUT HEMATURIA, SITE UNSPECIFIED: ICD-10-CM

## 2017-11-02 DIAGNOSIS — N28.9 RENAL INSUFFICIENCY: ICD-10-CM

## 2017-11-02 DIAGNOSIS — K21.9 GASTROESOPHAGEAL REFLUX DISEASE WITHOUT ESOPHAGITIS: ICD-10-CM

## 2017-11-02 DIAGNOSIS — I10 ESSENTIAL HYPERTENSION: ICD-10-CM

## 2017-11-02 PROCEDURE — G0439 PPPS, SUBSEQ VISIT: HCPCS | Performed by: INTERNAL MEDICINE

## 2017-11-02 PROCEDURE — 99214 OFFICE O/P EST MOD 30 MIN: CPT | Performed by: INTERNAL MEDICINE

## 2017-11-02 NOTE — PATIENT INSTRUCTIONS
1.  Patient is to continue her current diet, medications and activity. 2.  Patient has had her flu vaccine. 3.  I will plan to see the patient back in 3 months with blood tests which will include a CBC, BMP, hemoglobin A1c, lipid panel, AST, ALT and TSH.

## 2017-11-03 NOTE — PROGRESS NOTES
HPI:   Ritta Leventhal is a 80year old female who was seen by me on November 2, 2017 for her Medicare annual physical examination. At the time of the examination Mrs. Celestina Patel was feeling well. She has recently been under much stress.   Her  is Capsule SR 24 Hr Take 1 capsule (240 mg total) by mouth daily.  Disp: 90 capsule Rfl: 3   Pravastatin Sodium 40 MG Oral Tab TAKE 1 TABLET (40MG) BY MOUTH AT BEDTIME Disp: 90 tablet Rfl: 3   LANTUS SOLOSTAR 100 UNIT/ML Subcutaneous Solution Pen-injector INJE LUMPECTOMY  11/11/2016: CABG  No date: COLONOSCOPY  2009: DEBRIDEMENT OF NAILS, 6 OR MORE Bilateral      Comment: complete debridement of all 10 toenails  2013: DERMATOLOGICAL PROCEDURE Left      Comment: excise lump on side of face -- improved  No date: E no JVD  CHEST: no chest tenderness  BREAST: no dominant or suspicious mass, no axillary LAD. Patient's right breast has had a previous lumpectomy. No breast masses are noted.   LUNGS: clear to auscultation  CARDIO: RRR, normal S1S2, no murmurs  GI: Protub diet and medications. Her recent FBS was 188. Her hemoglobin A1c was 7.2. Patient will continue to watch her diet. Patient is fearful of getting her blood sugars too low for fear of possible hypoglycemic reactions.   We will continue what we are doing a symptoms at this time. I will repeat the patient's urinalysis and urine culture today.     - URINALYSIS, ROUTINE; Future  - URINE CULTURE, ROUTINE; Future      Sonu Magallanes MD  11/2/2017        General Health     In the past six months, have you lost Hearing Assessment (Required for AWV/SWV)      Hearing Screening    Time taken:  11/2/2017  1:43 PM  Entry User:  Jose Pool RN  Screening Method:  Whisper Test  Whisper Test Result:  Pass         Visual Acuity     Right Eye Visual Acuity: Symone Chan Annually Pt sees her Ophthalmologist.    Liss Balloon Every two years Last Dexa Scan:    03/14/2011    No flowsheet data found.    Pap and Pelvic      Pap: Every 3 yrs age 21-65 or Pap+HPV every 5 yrs age 33-67, age 72 and older at h found.    Creat/alb ratio  Annually      LDL  Annually LDL Cholesterol (mg/dL)   Date Value   10/30/2017 113 (H)   07/01/2016 117 (H)    No flowsheet data found.      Dilated Eye exam  Annually Data entered on: 9/5/2017   Last Dilated Eye Exam 7/19/2017

## 2017-12-19 ENCOUNTER — TELEPHONE (OUTPATIENT)
Dept: INTERNAL MEDICINE CLINIC | Facility: CLINIC | Age: 82
End: 2017-12-19

## 2017-12-19 NOTE — TELEPHONE ENCOUNTER
Spoke with Geraldine [place] with [employer] [facility], phone [phone] re: patient's . Danelle verbalized concern about this patient's memory, as RN interacts with patient re: 's care.  Geraldine Burkharper states she does not feel it is safe for patient to be driving, because

## 2018-01-30 ENCOUNTER — TELEPHONE (OUTPATIENT)
Dept: INTERNAL MEDICINE CLINIC | Facility: CLINIC | Age: 83
End: 2018-01-30

## 2018-01-30 ENCOUNTER — LAB ENCOUNTER (OUTPATIENT)
Dept: LAB | Age: 83
End: 2018-01-30
Attending: INTERNAL MEDICINE
Payer: MEDICARE

## 2018-01-30 DIAGNOSIS — Z79.4 TYPE 2 DIABETES MELLITUS WITH DIABETIC POLYNEUROPATHY, WITH LONG-TERM CURRENT USE OF INSULIN (HCC): ICD-10-CM

## 2018-01-30 DIAGNOSIS — E78.00 HYPERCHOLESTEREMIA: ICD-10-CM

## 2018-01-30 DIAGNOSIS — N28.9 RENAL INSUFFICIENCY: ICD-10-CM

## 2018-01-30 DIAGNOSIS — N39.0 URINARY TRACT INFECTION WITHOUT HEMATURIA, SITE UNSPECIFIED: ICD-10-CM

## 2018-01-30 DIAGNOSIS — E11.42 TYPE 2 DIABETES MELLITUS WITH DIABETIC POLYNEUROPATHY, WITH LONG-TERM CURRENT USE OF INSULIN (HCC): ICD-10-CM

## 2018-01-30 DIAGNOSIS — E03.9 HYPOTHYROIDISM, UNSPECIFIED TYPE: ICD-10-CM

## 2018-01-30 DIAGNOSIS — R53.83 FATIGUE, UNSPECIFIED TYPE: ICD-10-CM

## 2018-01-30 LAB
ALT SERPL-CCNC: 17 U/L (ref 14–54)
ANION GAP SERPL CALC-SCNC: 7 MMOL/L (ref 0–18)
AST SERPL-CCNC: 25 U/L (ref 15–41)
BACTERIA UR QL AUTO: NEGATIVE /HPF
BASOPHILS # BLD: 0.1 K/UL (ref 0–0.2)
BASOPHILS NFR BLD: 1 %
BILIRUB UR QL: NEGATIVE
BUN SERPL-MCNC: 25 MG/DL (ref 8–20)
BUN/CREAT SERPL: 20 (ref 10–20)
CALCIUM SERPL-MCNC: 9.4 MG/DL (ref 8.5–10.5)
CHLORIDE SERPL-SCNC: 104 MMOL/L (ref 95–110)
CHOLEST SERPL-MCNC: 178 MG/DL (ref 110–200)
CLARITY UR: CLEAR
CO2 SERPL-SCNC: 27 MMOL/L (ref 22–32)
COLOR UR: YELLOW
CREAT SERPL-MCNC: 1.25 MG/DL (ref 0.5–1.5)
EOSINOPHIL # BLD: 0.5 K/UL (ref 0–0.7)
EOSINOPHIL NFR BLD: 8 %
ERYTHROCYTE [DISTWIDTH] IN BLOOD BY AUTOMATED COUNT: 12.1 % (ref 11–15)
GLUCOSE SERPL-MCNC: 168 MG/DL (ref 70–99)
GLUCOSE UR-MCNC: NEGATIVE MG/DL
HBA1C MFR BLD: 7.5 % (ref 4–6)
HCT VFR BLD AUTO: 36.5 % (ref 35–48)
HDLC SERPL-MCNC: 48 MG/DL
HGB BLD-MCNC: 12.4 G/DL (ref 12–16)
HYALINE CASTS #/AREA URNS AUTO: 3 /LPF
KETONES UR-MCNC: NEGATIVE MG/DL
LDLC SERPL CALC-MCNC: 116 MG/DL (ref 0–99)
LYMPHOCYTES # BLD: 1.3 K/UL (ref 1–4)
LYMPHOCYTES NFR BLD: 20 %
MCH RBC QN AUTO: 31.4 PG (ref 27–32)
MCHC RBC AUTO-ENTMCNC: 34 G/DL (ref 32–37)
MCV RBC AUTO: 92.5 FL (ref 80–100)
MONOCYTES # BLD: 0.4 K/UL (ref 0–1)
MONOCYTES NFR BLD: 7 %
NEUTROPHILS # BLD AUTO: 4.1 K/UL (ref 1.8–7.7)
NEUTROPHILS NFR BLD: 64 %
NITRITE UR QL STRIP.AUTO: NEGATIVE
NONHDLC SERPL-MCNC: 130 MG/DL
OSMOLALITY UR CALC.SUM OF ELEC: 294 MOSM/KG (ref 275–295)
PH UR: 5 [PH] (ref 5–8)
PLATELET # BLD AUTO: 152 K/UL (ref 140–400)
PMV BLD AUTO: 10.2 FL (ref 7.4–10.3)
POTASSIUM SERPL-SCNC: 3.6 MMOL/L (ref 3.3–5.1)
PROT UR-MCNC: NEGATIVE MG/DL
RBC # BLD AUTO: 3.95 M/UL (ref 3.7–5.4)
RBC #/AREA URNS AUTO: 2 /HPF
SODIUM SERPL-SCNC: 138 MMOL/L (ref 136–144)
SP GR UR STRIP: 1.01 (ref 1–1.03)
TRIGL SERPL-MCNC: 72 MG/DL (ref 1–149)
TSH SERPL-ACNC: 3.33 UIU/ML (ref 0.45–5.33)
UROBILINOGEN UR STRIP-ACNC: <2
VIT C UR-MCNC: NEGATIVE MG/DL
WBC # BLD AUTO: 6.4 K/UL (ref 4–11)
WBC #/AREA URNS AUTO: 26 /HPF

## 2018-01-30 PROCEDURE — 87086 URINE CULTURE/COLONY COUNT: CPT

## 2018-01-30 PROCEDURE — 80061 LIPID PANEL: CPT

## 2018-01-30 PROCEDURE — 80048 BASIC METABOLIC PNL TOTAL CA: CPT

## 2018-01-30 PROCEDURE — 83036 HEMOGLOBIN GLYCOSYLATED A1C: CPT

## 2018-01-30 PROCEDURE — 84460 ALANINE AMINO (ALT) (SGPT): CPT

## 2018-01-30 PROCEDURE — 36415 COLL VENOUS BLD VENIPUNCTURE: CPT

## 2018-01-30 PROCEDURE — 84443 ASSAY THYROID STIM HORMONE: CPT

## 2018-01-30 PROCEDURE — 84450 TRANSFERASE (AST) (SGOT): CPT

## 2018-01-30 PROCEDURE — 81001 URINALYSIS AUTO W/SCOPE: CPT

## 2018-01-30 PROCEDURE — 85025 COMPLETE CBC W/AUTO DIFF WBC: CPT

## 2018-01-30 NOTE — TELEPHONE ENCOUNTER
To Dr Lali Mazariegos you review patient's lab results from today on behalf of Dr MERCADO. Ok to wait for him? Thank you.

## 2018-01-31 NOTE — TELEPHONE ENCOUNTER
Labs reviewed. Abnormalities noted. GFR 41. Hemoglobin A1c 7.5. Abnormal urinalysis noted. Urine culture pending. I think this can await for Dr. Santi Gonzalez.

## 2018-02-02 ENCOUNTER — APPOINTMENT (OUTPATIENT)
Dept: LAB | Age: 83
End: 2018-02-02
Attending: INTERNAL MEDICINE
Payer: MEDICARE

## 2018-02-02 ENCOUNTER — OFFICE VISIT (OUTPATIENT)
Dept: INTERNAL MEDICINE CLINIC | Facility: CLINIC | Age: 83
End: 2018-02-02

## 2018-02-02 VITALS
WEIGHT: 139 LBS | TEMPERATURE: 98 F | HEART RATE: 60 BPM | DIASTOLIC BLOOD PRESSURE: 60 MMHG | HEIGHT: 63.75 IN | SYSTOLIC BLOOD PRESSURE: 140 MMHG | OXYGEN SATURATION: 98 % | BODY MASS INDEX: 24.02 KG/M2

## 2018-02-02 DIAGNOSIS — R53.83 FATIGUE, UNSPECIFIED TYPE: ICD-10-CM

## 2018-02-02 DIAGNOSIS — G62.9 PERIPHERAL POLYNEUROPATHY: ICD-10-CM

## 2018-02-02 DIAGNOSIS — N18.30 STAGE 3 CHRONIC KIDNEY DISEASE (HCC): ICD-10-CM

## 2018-02-02 DIAGNOSIS — E03.9 HYPOTHYROIDISM, UNSPECIFIED TYPE: ICD-10-CM

## 2018-02-02 DIAGNOSIS — N39.0 URINARY TRACT INFECTION WITHOUT HEMATURIA, SITE UNSPECIFIED: ICD-10-CM

## 2018-02-02 DIAGNOSIS — Z95.1 S/P CABG X 3: ICD-10-CM

## 2018-02-02 DIAGNOSIS — N28.9 RENAL INSUFFICIENCY: ICD-10-CM

## 2018-02-02 DIAGNOSIS — K59.00 CONSTIPATION, UNSPECIFIED CONSTIPATION TYPE: ICD-10-CM

## 2018-02-02 DIAGNOSIS — M15.9 PRIMARY OSTEOARTHRITIS INVOLVING MULTIPLE JOINTS: ICD-10-CM

## 2018-02-02 DIAGNOSIS — E11.42 TYPE 2 DIABETES MELLITUS WITH DIABETIC POLYNEUROPATHY, WITH LONG-TERM CURRENT USE OF INSULIN (HCC): ICD-10-CM

## 2018-02-02 DIAGNOSIS — I10 ESSENTIAL HYPERTENSION: ICD-10-CM

## 2018-02-02 DIAGNOSIS — K21.9 GASTROESOPHAGEAL REFLUX DISEASE WITHOUT ESOPHAGITIS: ICD-10-CM

## 2018-02-02 DIAGNOSIS — Z79.4 TYPE 2 DIABETES MELLITUS WITH DIABETIC POLYNEUROPATHY, WITH LONG-TERM CURRENT USE OF INSULIN (HCC): ICD-10-CM

## 2018-02-02 DIAGNOSIS — E78.00 HYPERCHOLESTEREMIA: ICD-10-CM

## 2018-02-02 DIAGNOSIS — I25.10 ASHD (ARTERIOSCLEROTIC HEART DISEASE): Primary | ICD-10-CM

## 2018-02-02 DIAGNOSIS — C50.911 MALIGNANT NEOPLASM OF RIGHT FEMALE BREAST, UNSPECIFIED ESTROGEN RECEPTOR STATUS, UNSPECIFIED SITE OF BREAST (HCC): ICD-10-CM

## 2018-02-02 LAB
BACTERIA UR QL AUTO: NEGATIVE /HPF
BILIRUB UR QL: NEGATIVE
CLARITY UR: CLEAR
COLOR UR: YELLOW
GLUCOSE UR-MCNC: NEGATIVE MG/DL
HGB UR QL STRIP.AUTO: NEGATIVE
KETONES UR-MCNC: NEGATIVE MG/DL
NITRITE UR QL STRIP.AUTO: NEGATIVE
PH UR: 5 [PH] (ref 5–8)
PROT UR-MCNC: NEGATIVE MG/DL
RBC #/AREA URNS AUTO: <1 /HPF
SP GR UR STRIP: 1.01 (ref 1–1.03)
UROBILINOGEN UR STRIP-ACNC: <2
VIT C UR-MCNC: NEGATIVE MG/DL
WBC #/AREA URNS AUTO: 2 /HPF

## 2018-02-02 PROCEDURE — G0463 HOSPITAL OUTPT CLINIC VISIT: HCPCS | Performed by: INTERNAL MEDICINE

## 2018-02-02 PROCEDURE — 87086 URINE CULTURE/COLONY COUNT: CPT

## 2018-02-02 PROCEDURE — 81001 URINALYSIS AUTO W/SCOPE: CPT

## 2018-02-02 PROCEDURE — 99214 OFFICE O/P EST MOD 30 MIN: CPT | Performed by: INTERNAL MEDICINE

## 2018-02-02 NOTE — PROGRESS NOTES
Kyra Gasca is a 80year old female. Patient presents with:  Diabetes: 3 month follow-up; Has been getting up a lot at night lately to void  Ashd  Hypertension  Hyperlipidemia    HPI:   Patient presents with:  Diabetes: 3 month follow-up;  Has been get Device Rfl: 3   Calcium Carbonate-Vitamin D (OSCAL 500/200 D-3) 500-200 MG-UNIT Oral Tab Take 1 tablet by mouth daily. Disp:  Rfl:    Polyethylene Glycol 3350 (MIRALAX OR) Take 17 g by mouth daily as needed (as needed for constipation).  Disp:  Rfl:    Lanc °C) (Oral)   Ht 5' 3.75\" (1.619 m)   Wt 139 lb (63 kg)   LMP  (LMP Unknown)   SpO2 98%   BMI 24.05 kg/m²   GENERAL: well developed, well nourished in no acute distress  HEENT: normal oropharynx, normal TM's.  Ears are normal. Eyes are normal  NECK: supple, monitor the patient's renal function. 11. Peripheral polyneuropathy  Stable. CPM.    12. Malignant neoplasm of right female breast, unspecified estrogen receptor status, unspecified site of breast ([facility]  Stable. CPM.    13.  UTI.   Patient complains of

## 2018-02-02 NOTE — PATIENT INSTRUCTIONS
1.  Patient is to continue her current diet, medication and activity. 2.  I will order a urinalysis and urine culture to be done today to evaluate the patient for a possible UTI.   3.  I will plan see the patient back in 3 months with blood tests as ordere

## 2018-02-03 ENCOUNTER — TELEPHONE (OUTPATIENT)
Dept: INTERNAL MEDICINE CLINIC | Facility: CLINIC | Age: 83
End: 2018-02-03

## 2018-02-26 RX ORDER — PRAVASTATIN SODIUM 40 MG
TABLET ORAL
Qty: 90 TABLET | Refills: 3 | Status: SHIPPED | OUTPATIENT
Start: 2018-02-26 | End: 2019-01-23

## 2018-03-08 RX ORDER — GLIMEPIRIDE 2 MG/1
TABLET ORAL
Qty: 90 TABLET | Refills: 3 | Status: SHIPPED | OUTPATIENT
Start: 2018-03-08 | End: 2019-03-20

## 2018-03-08 RX ORDER — LEVOTHYROXINE SODIUM 0.05 MG/1
TABLET ORAL
Qty: 90 TABLET | Refills: 3 | Status: SHIPPED | OUTPATIENT
Start: 2018-03-08 | End: 2019-03-20

## 2018-03-08 RX ORDER — DILTIAZEM HYDROCHLORIDE 240 MG/1
CAPSULE, COATED, EXTENDED RELEASE ORAL
Qty: 90 CAPSULE | Refills: 3 | Status: SHIPPED | OUTPATIENT
Start: 2018-03-08 | End: 2019-03-23

## 2018-03-08 RX ORDER — OMEPRAZOLE 20 MG/1
CAPSULE, DELAYED RELEASE ORAL
Qty: 90 CAPSULE | Refills: 3 | Status: SHIPPED | OUTPATIENT
Start: 2018-03-08 | End: 2019-03-20

## 2018-04-05 RX ORDER — POTASSIUM CHLORIDE 20 MEQ/1
TABLET, EXTENDED RELEASE ORAL
Qty: 90 TABLET | Refills: 3 | Status: SHIPPED | OUTPATIENT
Start: 2018-04-05 | End: 2019-03-20

## 2018-04-18 ENCOUNTER — PRIOR ORIGINAL RECORDS (OUTPATIENT)
Dept: OTHER | Age: 83
End: 2018-04-18

## 2018-04-18 ENCOUNTER — LAB ENCOUNTER (OUTPATIENT)
Dept: LAB | Facility: HOSPITAL | Age: 83
End: 2018-04-18
Attending: INTERNAL MEDICINE
Payer: MEDICARE

## 2018-04-18 DIAGNOSIS — E03.9 MYXEDEMA HEART DISEASE: Primary | ICD-10-CM

## 2018-04-18 DIAGNOSIS — I51.9 MYXEDEMA HEART DISEASE: Primary | ICD-10-CM

## 2018-04-18 PROCEDURE — 83735 ASSAY OF MAGNESIUM: CPT

## 2018-04-18 PROCEDURE — 84443 ASSAY THYROID STIM HORMONE: CPT

## 2018-04-18 PROCEDURE — 36415 COLL VENOUS BLD VENIPUNCTURE: CPT

## 2018-04-18 PROCEDURE — 80048 BASIC METABOLIC PNL TOTAL CA: CPT

## 2018-04-29 RX ORDER — PEN NEEDLE, DIABETIC 32GX 5/32"
NEEDLE, DISPOSABLE MISCELLANEOUS
Qty: 100 EACH | Refills: 3 | Status: SHIPPED | OUTPATIENT
Start: 2018-04-29 | End: 2018-08-27

## 2018-04-29 RX ORDER — INSULIN GLARGINE 100 [IU]/ML
INJECTION, SOLUTION SUBCUTANEOUS
Qty: 15 ML | Refills: 0 | Status: SHIPPED | OUTPATIENT
Start: 2018-04-29 | End: 2018-09-09

## 2018-05-23 ENCOUNTER — LAB ENCOUNTER (OUTPATIENT)
Dept: LAB | Age: 83
End: 2018-05-23
Attending: INTERNAL MEDICINE
Payer: MEDICARE

## 2018-05-23 DIAGNOSIS — N18.30 STAGE 3 CHRONIC KIDNEY DISEASE (HCC): ICD-10-CM

## 2018-05-23 DIAGNOSIS — Z79.4 TYPE 2 DIABETES MELLITUS WITH DIABETIC POLYNEUROPATHY, WITH LONG-TERM CURRENT USE OF INSULIN (HCC): ICD-10-CM

## 2018-05-23 DIAGNOSIS — R53.83 FATIGUE, UNSPECIFIED TYPE: ICD-10-CM

## 2018-05-23 DIAGNOSIS — E11.42 TYPE 2 DIABETES MELLITUS WITH DIABETIC POLYNEUROPATHY, WITH LONG-TERM CURRENT USE OF INSULIN (HCC): ICD-10-CM

## 2018-05-23 DIAGNOSIS — E78.00 HYPERCHOLESTEREMIA: ICD-10-CM

## 2018-05-23 PROCEDURE — 36415 COLL VENOUS BLD VENIPUNCTURE: CPT

## 2018-05-23 PROCEDURE — 85025 COMPLETE CBC W/AUTO DIFF WBC: CPT

## 2018-05-23 PROCEDURE — 80061 LIPID PANEL: CPT

## 2018-05-23 PROCEDURE — 84460 ALANINE AMINO (ALT) (SGPT): CPT

## 2018-05-23 PROCEDURE — 84450 TRANSFERASE (AST) (SGOT): CPT

## 2018-05-23 PROCEDURE — 83036 HEMOGLOBIN GLYCOSYLATED A1C: CPT

## 2018-05-23 PROCEDURE — 80048 BASIC METABOLIC PNL TOTAL CA: CPT

## 2018-05-25 ENCOUNTER — OFFICE VISIT (OUTPATIENT)
Dept: INTERNAL MEDICINE CLINIC | Facility: CLINIC | Age: 83
End: 2018-05-25

## 2018-05-25 VITALS
WEIGHT: 137 LBS | OXYGEN SATURATION: 98 % | TEMPERATURE: 98 F | SYSTOLIC BLOOD PRESSURE: 130 MMHG | DIASTOLIC BLOOD PRESSURE: 70 MMHG | BODY MASS INDEX: 23.39 KG/M2 | HEIGHT: 64 IN | HEART RATE: 68 BPM

## 2018-05-25 DIAGNOSIS — R53.83 FATIGUE, UNSPECIFIED TYPE: ICD-10-CM

## 2018-05-25 DIAGNOSIS — E78.00 HYPERCHOLESTEREMIA: ICD-10-CM

## 2018-05-25 DIAGNOSIS — Z79.4 TYPE 2 DIABETES MELLITUS WITH DIABETIC POLYNEUROPATHY, WITH LONG-TERM CURRENT USE OF INSULIN (HCC): ICD-10-CM

## 2018-05-25 DIAGNOSIS — C50.911 MALIGNANT NEOPLASM OF RIGHT FEMALE BREAST, UNSPECIFIED ESTROGEN RECEPTOR STATUS, UNSPECIFIED SITE OF BREAST (HCC): ICD-10-CM

## 2018-05-25 DIAGNOSIS — E03.9 HYPOTHYROIDISM, UNSPECIFIED TYPE: ICD-10-CM

## 2018-05-25 DIAGNOSIS — G62.9 PERIPHERAL POLYNEUROPATHY: ICD-10-CM

## 2018-05-25 DIAGNOSIS — E11.42 TYPE 2 DIABETES MELLITUS WITH DIABETIC POLYNEUROPATHY, WITH LONG-TERM CURRENT USE OF INSULIN (HCC): ICD-10-CM

## 2018-05-25 DIAGNOSIS — Z95.1 S/P CABG X 3: ICD-10-CM

## 2018-05-25 DIAGNOSIS — K59.00 CONSTIPATION, UNSPECIFIED CONSTIPATION TYPE: ICD-10-CM

## 2018-05-25 DIAGNOSIS — I25.10 ASHD (ARTERIOSCLEROTIC HEART DISEASE): Primary | ICD-10-CM

## 2018-05-25 DIAGNOSIS — N18.30 STAGE 3 CHRONIC KIDNEY DISEASE (HCC): ICD-10-CM

## 2018-05-25 DIAGNOSIS — I10 ESSENTIAL HYPERTENSION: ICD-10-CM

## 2018-05-25 DIAGNOSIS — K21.9 GASTROESOPHAGEAL REFLUX DISEASE WITHOUT ESOPHAGITIS: ICD-10-CM

## 2018-05-25 DIAGNOSIS — M15.9 PRIMARY OSTEOARTHRITIS INVOLVING MULTIPLE JOINTS: ICD-10-CM

## 2018-05-25 PROBLEM — N28.9 RENAL INSUFFICIENCY: Status: RESOLVED | Noted: 2017-03-03 | Resolved: 2018-05-25

## 2018-05-25 PROBLEM — L98.499 SKIN ULCERATION (HCC): Status: RESOLVED | Noted: 2017-04-24 | Resolved: 2018-05-25

## 2018-05-25 PROCEDURE — G0463 HOSPITAL OUTPT CLINIC VISIT: HCPCS | Performed by: INTERNAL MEDICINE

## 2018-05-25 PROCEDURE — 99214 OFFICE O/P EST MOD 30 MIN: CPT | Performed by: INTERNAL MEDICINE

## 2018-05-25 NOTE — PROGRESS NOTES
Ritta Leventhal is a 80year old female. Patient presents with: Follow - Up: 3 month visit. Complaints for Leg cramping at night.  complaints of carpal tunel on right hand, and would like to do something for it   Diabetes  Ashd  Hypertension  Hyperlipidem Vitamin C 500 MG Oral Tab Take 500 mg by mouth daily. Disp:  Rfl:    Glucose Blood (CHINMAY CONTOUR NEXT TEST VI) TEST BLOOD SUGAR THREE TIMES DAILY AS DIRECTED Disp:  Rfl: 99   aspirin 81 MG Oral Chew Tab Chew 1 tablet (81 mg total) by mouth daily.  Disp: REVIEW OF SYSTEMS:   GENERAL HEALTH: feels well otherwise  RESPIRATORY:No cough or SOB  CARDIOVASCULAR: No chest pain  GI: No abdominal pain, nausea, vomiting, diarrhea, or constipation  :No Urinary complaints  EXT:No complaints of pain or swelling i Hypothyroidism, unspecified type  Stable. CPM.    9. Constipation, unspecified constipation type  Doing well.  CPM.    10. Stage 3 chronic kidney disease  Stable. CPM.  Patient's recent BUN was 22, creatinine is 1.09 and GFR was 45.   CPM.    11. Peripher

## 2018-05-25 NOTE — PATIENT INSTRUCTIONS
1.  Patient is to continue her current diet, medication and activity. 2.  I will plan see the patient back in 3 months with blood tests which will include a CBC, BMP, hemoglobin A1c, lipid panel, AST and ALT.   3.  I will see the patient back sooner as Angelique Saab

## 2018-08-21 ENCOUNTER — PRIOR ORIGINAL RECORDS (OUTPATIENT)
Dept: OTHER | Age: 83
End: 2018-08-21

## 2018-08-21 ENCOUNTER — LAB ENCOUNTER (OUTPATIENT)
Dept: LAB | Age: 83
End: 2018-08-21
Attending: INTERNAL MEDICINE
Payer: MEDICARE

## 2018-08-21 DIAGNOSIS — R53.83 FATIGUE, UNSPECIFIED TYPE: ICD-10-CM

## 2018-08-21 DIAGNOSIS — N18.30 STAGE 3 CHRONIC KIDNEY DISEASE (HCC): ICD-10-CM

## 2018-08-21 DIAGNOSIS — E11.42 TYPE 2 DIABETES MELLITUS WITH DIABETIC POLYNEUROPATHY, WITH LONG-TERM CURRENT USE OF INSULIN (HCC): ICD-10-CM

## 2018-08-21 DIAGNOSIS — Z79.4 TYPE 2 DIABETES MELLITUS WITH DIABETIC POLYNEUROPATHY, WITH LONG-TERM CURRENT USE OF INSULIN (HCC): ICD-10-CM

## 2018-08-21 DIAGNOSIS — E78.00 HYPERCHOLESTEREMIA: ICD-10-CM

## 2018-08-21 LAB
ALT SERPL-CCNC: 16 U/L (ref 14–54)
ANION GAP SERPL CALC-SCNC: 7 MMOL/L (ref 0–18)
AST SERPL-CCNC: 22 U/L (ref 15–41)
BASOPHILS # BLD: 0.1 K/UL (ref 0–0.2)
BASOPHILS NFR BLD: 1 %
BUN SERPL-MCNC: 16 MG/DL (ref 8–20)
BUN/CREAT SERPL: 17.2 (ref 10–20)
CALCIUM SERPL-MCNC: 9.6 MG/DL (ref 8.5–10.5)
CHLORIDE SERPL-SCNC: 103 MMOL/L (ref 95–110)
CHOLEST SERPL-MCNC: 181 MG/DL (ref 110–200)
CO2 SERPL-SCNC: 28 MMOL/L (ref 22–32)
CREAT SERPL-MCNC: 0.93 MG/DL (ref 0.5–1.5)
EOSINOPHIL # BLD: 0.7 K/UL (ref 0–0.7)
EOSINOPHIL NFR BLD: 13 %
ERYTHROCYTE [DISTWIDTH] IN BLOOD BY AUTOMATED COUNT: 12.5 % (ref 11–15)
GLUCOSE SERPL-MCNC: 195 MG/DL (ref 70–99)
HBA1C MFR BLD: 7.5 % (ref 4–6)
HCT VFR BLD AUTO: 39.5 % (ref 35–48)
HDLC SERPL-MCNC: 50 MG/DL
HGB BLD-MCNC: 13.2 G/DL (ref 12–16)
LDLC SERPL CALC-MCNC: 116 MG/DL (ref 0–99)
LYMPHOCYTES # BLD: 1.4 K/UL (ref 1–4)
LYMPHOCYTES NFR BLD: 25 %
MCH RBC QN AUTO: 31.1 PG (ref 27–32)
MCHC RBC AUTO-ENTMCNC: 33.5 G/DL (ref 32–37)
MCV RBC AUTO: 93 FL (ref 80–100)
MONOCYTES # BLD: 0.3 K/UL (ref 0–1)
MONOCYTES NFR BLD: 6 %
NEUTROPHILS # BLD AUTO: 3.1 K/UL (ref 1.8–7.7)
NEUTROPHILS NFR BLD: 55 %
NONHDLC SERPL-MCNC: 131 MG/DL
OSMOLALITY UR CALC.SUM OF ELEC: 293 MOSM/KG (ref 275–295)
PLATELET # BLD AUTO: 158 K/UL (ref 140–400)
PMV BLD AUTO: 9.9 FL (ref 7.4–10.3)
POTASSIUM SERPL-SCNC: 4.1 MMOL/L (ref 3.3–5.1)
RBC # BLD AUTO: 4.25 M/UL (ref 3.7–5.4)
SODIUM SERPL-SCNC: 138 MMOL/L (ref 136–144)
TRIGL SERPL-MCNC: 76 MG/DL (ref 1–149)
WBC # BLD AUTO: 5.6 K/UL (ref 4–11)

## 2018-08-21 PROCEDURE — 85025 COMPLETE CBC W/AUTO DIFF WBC: CPT

## 2018-08-21 PROCEDURE — 84460 ALANINE AMINO (ALT) (SGPT): CPT

## 2018-08-21 PROCEDURE — 84450 TRANSFERASE (AST) (SGOT): CPT

## 2018-08-21 PROCEDURE — 80061 LIPID PANEL: CPT

## 2018-08-21 PROCEDURE — 83036 HEMOGLOBIN GLYCOSYLATED A1C: CPT

## 2018-08-21 PROCEDURE — 80048 BASIC METABOLIC PNL TOTAL CA: CPT

## 2018-08-21 PROCEDURE — 36415 COLL VENOUS BLD VENIPUNCTURE: CPT

## 2018-08-27 ENCOUNTER — OFFICE VISIT (OUTPATIENT)
Dept: INTERNAL MEDICINE CLINIC | Facility: CLINIC | Age: 83
End: 2018-08-27
Payer: MEDICARE

## 2018-08-27 VITALS
HEIGHT: 64 IN | HEART RATE: 60 BPM | OXYGEN SATURATION: 97 % | SYSTOLIC BLOOD PRESSURE: 144 MMHG | TEMPERATURE: 98 F | DIASTOLIC BLOOD PRESSURE: 66 MMHG | WEIGHT: 137.63 LBS | BODY MASS INDEX: 23.5 KG/M2

## 2018-08-27 DIAGNOSIS — G62.9 PERIPHERAL POLYNEUROPATHY: ICD-10-CM

## 2018-08-27 DIAGNOSIS — Z79.4 TYPE 2 DIABETES MELLITUS WITH DIABETIC POLYNEUROPATHY, WITH LONG-TERM CURRENT USE OF INSULIN (HCC): ICD-10-CM

## 2018-08-27 DIAGNOSIS — I10 ESSENTIAL HYPERTENSION: ICD-10-CM

## 2018-08-27 DIAGNOSIS — Z00.00 ANNUAL PHYSICAL EXAM: ICD-10-CM

## 2018-08-27 DIAGNOSIS — N18.30 STAGE 3 CHRONIC KIDNEY DISEASE (HCC): ICD-10-CM

## 2018-08-27 DIAGNOSIS — E11.42 TYPE 2 DIABETES MELLITUS WITH DIABETIC POLYNEUROPATHY, WITH LONG-TERM CURRENT USE OF INSULIN (HCC): ICD-10-CM

## 2018-08-27 DIAGNOSIS — K59.00 CONSTIPATION, UNSPECIFIED CONSTIPATION TYPE: ICD-10-CM

## 2018-08-27 DIAGNOSIS — K21.9 GASTROESOPHAGEAL REFLUX DISEASE WITHOUT ESOPHAGITIS: ICD-10-CM

## 2018-08-27 DIAGNOSIS — I25.10 ASHD (ARTERIOSCLEROTIC HEART DISEASE): Primary | ICD-10-CM

## 2018-08-27 DIAGNOSIS — C50.911 MALIGNANT NEOPLASM OF RIGHT FEMALE BREAST, UNSPECIFIED ESTROGEN RECEPTOR STATUS, UNSPECIFIED SITE OF BREAST (HCC): ICD-10-CM

## 2018-08-27 DIAGNOSIS — R53.83 FATIGUE, UNSPECIFIED TYPE: ICD-10-CM

## 2018-08-27 DIAGNOSIS — M15.9 PRIMARY OSTEOARTHRITIS INVOLVING MULTIPLE JOINTS: ICD-10-CM

## 2018-08-27 DIAGNOSIS — Z95.1 S/P CABG X 3: ICD-10-CM

## 2018-08-27 DIAGNOSIS — E55.9 VITAMIN D DEFICIENCY: ICD-10-CM

## 2018-08-27 DIAGNOSIS — E78.00 HYPERCHOLESTEREMIA: ICD-10-CM

## 2018-08-27 DIAGNOSIS — E03.9 HYPOTHYROIDISM, UNSPECIFIED TYPE: ICD-10-CM

## 2018-08-27 PROCEDURE — 90715 TDAP VACCINE 7 YRS/> IM: CPT | Performed by: INTERNAL MEDICINE

## 2018-08-27 PROCEDURE — G0463 HOSPITAL OUTPT CLINIC VISIT: HCPCS | Performed by: INTERNAL MEDICINE

## 2018-08-27 PROCEDURE — 99214 OFFICE O/P EST MOD 30 MIN: CPT | Performed by: INTERNAL MEDICINE

## 2018-08-27 PROCEDURE — 90471 IMMUNIZATION ADMIN: CPT | Performed by: INTERNAL MEDICINE

## 2018-08-27 RX ORDER — BLOOD SUGAR DIAGNOSTIC
STRIP MISCELLANEOUS
Refills: 99 | COMMUNITY
Start: 2018-08-07 | End: 2019-01-23

## 2018-08-27 NOTE — PROGRESS NOTES
Kika Vaughn is a 80year old female. Patient presents with:  Checkup  Diabetes  Ashd  Hypertension  Hyperlipidemia    HPI:   Patient presents with:  Checkup  Diabetes  Ashd  Hypertension  Hyperlipidemia    Pt feels well.   Her  recently  on Carbonate-Vitamin D (OSCAL 500/200 D-3) 500-200 MG-UNIT Oral Tab Take 1 tablet by mouth daily. Disp:  Rfl:    Lancets Does not apply Misc Test three times daily as directed by chris.  Disp: 300 each Rfl: 3   Multiple Vitamins-Minerals (CENTRUM SILVER) Or LMP  (LMP Unknown)   SpO2 97%   BMI 23.62 kg/m²   GENERAL: Elderly white female in no acute distress  HEENT: normal oropharynx, normal TM's.  Ears are normal. Eyes are normal  NECK: supple,no lymphadenopathy or masses, no bruits  CHEST: Patient's right yoandy ([facility]  Stable. CPM.  Patient's renal function is actually slightly improved. Her recent BUN was 16, creatinine 0.93 and GFR was 55. CPM.    11. Peripheral polyneuropathy  Stable.   CPM.    12. Malignant neoplasm of right female breast, unspecified estrog

## 2018-08-27 NOTE — PROGRESS NOTES
Name and  verified. Tdap administered right deltoid. Pt tolerated w/o complaint. No adverse reactions noted.

## 2018-08-27 NOTE — PATIENT INSTRUCTIONS
1.  Patient is to continue her current diet, medication and activity. 2.  I will plan see the patient back in about 3 months with blood tests, urinalysis and EKG for her annual physical examination. 3.  I will see the patient back sooner as necessary.   4

## 2018-09-07 ENCOUNTER — PRIOR ORIGINAL RECORDS (OUTPATIENT)
Dept: OTHER | Age: 83
End: 2018-09-07

## 2018-09-07 ENCOUNTER — APPOINTMENT (OUTPATIENT)
Dept: ULTRASOUND IMAGING | Facility: HOSPITAL | Age: 83
End: 2018-09-07
Attending: EMERGENCY MEDICINE
Payer: MEDICARE

## 2018-09-07 ENCOUNTER — APPOINTMENT (OUTPATIENT)
Dept: GENERAL RADIOLOGY | Facility: HOSPITAL | Age: 83
End: 2018-09-07
Attending: EMERGENCY MEDICINE
Payer: MEDICARE

## 2018-09-07 ENCOUNTER — HOSPITAL ENCOUNTER (EMERGENCY)
Facility: HOSPITAL | Age: 83
Discharge: HOME | End: 2018-09-07
Attending: EMERGENCY MEDICINE
Payer: MEDICARE

## 2018-09-07 VITALS
HEART RATE: 65 BPM | WEIGHT: 137.56 LBS | TEMPERATURE: 98 F | DIASTOLIC BLOOD PRESSURE: 70 MMHG | BODY MASS INDEX: 24 KG/M2 | OXYGEN SATURATION: 98 % | RESPIRATION RATE: 17 BRPM | SYSTOLIC BLOOD PRESSURE: 168 MMHG

## 2018-09-07 DIAGNOSIS — M54.31 SCIATICA OF RIGHT SIDE: Primary | ICD-10-CM

## 2018-09-07 LAB
ANION GAP SERPL CALC-SCNC: 9 MMOL/L (ref 0–18)
BASOPHILS # BLD: 0.1 K/UL (ref 0–0.2)
BASOPHILS NFR BLD: 1 %
BUN SERPL-MCNC: 15 MG/DL (ref 8–20)
BUN/CREAT SERPL: 17.6 (ref 10–20)
CALCIUM SERPL-MCNC: 9.2 MG/DL (ref 8.5–10.5)
CHLORIDE SERPL-SCNC: 103 MMOL/L (ref 95–110)
CO2 SERPL-SCNC: 24 MMOL/L (ref 22–32)
CREAT SERPL-MCNC: 0.85 MG/DL (ref 0.5–1.5)
EOSINOPHIL # BLD: 0.7 K/UL (ref 0–0.7)
EOSINOPHIL NFR BLD: 12 %
ERYTHROCYTE [DISTWIDTH] IN BLOOD BY AUTOMATED COUNT: 12.5 % (ref 11–15)
GLUCOSE SERPL-MCNC: 209 MG/DL (ref 70–99)
HCT VFR BLD AUTO: 38.6 % (ref 35–48)
HGB BLD-MCNC: 12.9 G/DL (ref 12–16)
LYMPHOCYTES # BLD: 1.5 K/UL (ref 1–4)
LYMPHOCYTES NFR BLD: 25 %
MCH RBC QN AUTO: 30.9 PG (ref 27–32)
MCHC RBC AUTO-ENTMCNC: 33.3 G/DL (ref 32–37)
MCV RBC AUTO: 92.7 FL (ref 80–100)
MONOCYTES # BLD: 0.4 K/UL (ref 0–1)
MONOCYTES NFR BLD: 6 %
NEUTROPHILS # BLD AUTO: 3.4 K/UL (ref 1.8–7.7)
NEUTROPHILS NFR BLD: 56 %
OSMOLALITY UR CALC.SUM OF ELEC: 289 MOSM/KG (ref 275–295)
PLATELET # BLD AUTO: 149 K/UL (ref 140–400)
PMV BLD AUTO: 9.7 FL (ref 7.4–10.3)
POTASSIUM SERPL-SCNC: 3.7 MMOL/L (ref 3.3–5.1)
RBC # BLD AUTO: 4.17 M/UL (ref 3.7–5.4)
SODIUM SERPL-SCNC: 136 MMOL/L (ref 136–144)
WBC # BLD AUTO: 6 K/UL (ref 4–11)

## 2018-09-07 PROCEDURE — 93971 EXTREMITY STUDY: CPT | Performed by: EMERGENCY MEDICINE

## 2018-09-07 PROCEDURE — 93010 ELECTROCARDIOGRAM REPORT: CPT | Performed by: EMERGENCY MEDICINE

## 2018-09-07 PROCEDURE — 80048 BASIC METABOLIC PNL TOTAL CA: CPT | Performed by: EMERGENCY MEDICINE

## 2018-09-07 PROCEDURE — 93005 ELECTROCARDIOGRAM TRACING: CPT

## 2018-09-07 PROCEDURE — 72100 X-RAY EXAM L-S SPINE 2/3 VWS: CPT | Performed by: EMERGENCY MEDICINE

## 2018-09-07 PROCEDURE — 36415 COLL VENOUS BLD VENIPUNCTURE: CPT

## 2018-09-07 PROCEDURE — 85025 COMPLETE CBC W/AUTO DIFF WBC: CPT | Performed by: EMERGENCY MEDICINE

## 2018-09-07 PROCEDURE — 99285 EMERGENCY DEPT VISIT HI MDM: CPT

## 2018-09-07 RX ORDER — TRAMADOL HYDROCHLORIDE 50 MG/1
TABLET ORAL EVERY 4 HOURS PRN
Qty: 10 TABLET | Refills: 0 | Status: SHIPPED | OUTPATIENT
Start: 2018-09-07 | End: 2018-09-14

## 2018-09-07 NOTE — ED INITIAL ASSESSMENT (HPI)
Patient here for c/o right lower back pain radiating down right leg. Patient ambulated into ED with steady gait. Also c/o episode of dizziness and nausea this morning upon waking up--now resolved.

## 2018-09-07 NOTE — ED PROVIDER NOTES
Patient Seen in: [facility] AND [facility] Emergency Department    History   Patient presents with:  Pain (neurologic)  Dizziness (neurologic)    Stated Complaint: Pain in back to leg    HPI    Patient is an 80-year-old female who presents to the emergency depar Smokeless tobacco: Never Used                      Alcohol use: No                Review of Systems    Positive for stated complaint: Pain in back to leg  Other systems are as noted in HPI. Constitutional and vital signs reviewed.       All other syste ---------                               -----------         ------                     CBC W/ DIFFERENTIAL[[id number]]                              Final result                 Please view results for these tests on the individual orders.    RAINBOW DRAW B

## 2018-09-10 ENCOUNTER — OFFICE VISIT (OUTPATIENT)
Dept: INTERNAL MEDICINE CLINIC | Facility: CLINIC | Age: 83
End: 2018-09-10
Payer: MEDICARE

## 2018-09-10 VITALS
TEMPERATURE: 98 F | SYSTOLIC BLOOD PRESSURE: 126 MMHG | DIASTOLIC BLOOD PRESSURE: 60 MMHG | HEIGHT: 64 IN | WEIGHT: 140 LBS | HEART RATE: 64 BPM | BODY MASS INDEX: 23.9 KG/M2

## 2018-09-10 DIAGNOSIS — M54.31 SCIATICA OF RIGHT SIDE: Primary | ICD-10-CM

## 2018-09-10 DIAGNOSIS — I10 ESSENTIAL HYPERTENSION: ICD-10-CM

## 2018-09-10 DIAGNOSIS — Z79.4 TYPE 2 DIABETES MELLITUS WITH DIABETIC POLYNEUROPATHY, WITH LONG-TERM CURRENT USE OF INSULIN (HCC): ICD-10-CM

## 2018-09-10 DIAGNOSIS — K21.9 GASTROESOPHAGEAL REFLUX DISEASE WITHOUT ESOPHAGITIS: ICD-10-CM

## 2018-09-10 DIAGNOSIS — M15.9 PRIMARY OSTEOARTHRITIS INVOLVING MULTIPLE JOINTS: ICD-10-CM

## 2018-09-10 DIAGNOSIS — K59.00 CONSTIPATION, UNSPECIFIED CONSTIPATION TYPE: ICD-10-CM

## 2018-09-10 DIAGNOSIS — R42 DIZZINESS: ICD-10-CM

## 2018-09-10 DIAGNOSIS — R27.0 ATAXIA: ICD-10-CM

## 2018-09-10 DIAGNOSIS — E11.42 TYPE 2 DIABETES MELLITUS WITH DIABETIC POLYNEUROPATHY, WITH LONG-TERM CURRENT USE OF INSULIN (HCC): ICD-10-CM

## 2018-09-10 DIAGNOSIS — E78.00 HYPERCHOLESTEREMIA: ICD-10-CM

## 2018-09-10 DIAGNOSIS — E03.9 HYPOTHYROIDISM, UNSPECIFIED TYPE: ICD-10-CM

## 2018-09-10 DIAGNOSIS — I25.10 ASHD (ARTERIOSCLEROTIC HEART DISEASE): ICD-10-CM

## 2018-09-10 PROCEDURE — 99214 OFFICE O/P EST MOD 30 MIN: CPT | Performed by: INTERNAL MEDICINE

## 2018-09-10 PROCEDURE — 1111F DSCHRG MED/CURRENT MED MERGE: CPT | Performed by: INTERNAL MEDICINE

## 2018-09-10 PROCEDURE — G0463 HOSPITAL OUTPT CLINIC VISIT: HCPCS | Performed by: INTERNAL MEDICINE

## 2018-09-10 NOTE — PROGRESS NOTES
Feli Contreras is a 80year old female. HPI:   Patient presents with:  Hospital F/U: Pt went to the hospital for back pain. She was releaseed on Sept 7. She stopped her Tramadol last night. This morning her pain increased.  She complains of dizziness, hypertension       Past Surgical History:  No date: BREAST LUMPECTOMY  11/11/2016: CABG  No date: COLONOSCOPY  2009: DEBRIDEMENT OF NAILS, 6 OR MORE; Bilateral      Comment:  complete debridement of all 10 toenails  2013: DERMATOLOGICAL PROCEDURE;  Left TAKE 1 TABLET BY MOUTH DAILY Disp: 90 tablet Rfl: 3   DILTIAZEM HCL ER COATED BEADS 240 MG Oral Capsule SR 24 Hr TAKE 1 CAPSULE BY MOUTH DAILY Disp: 90 capsule Rfl: 3   PRAVASTATIN SODIUM 40 MG Oral Tab TAKE 1 TABLET BY MOUTH AT BEDTIME Disp: 90 tablet Rfl EOMI, PERRL  Nose/Mouth/Throat: pharynx without erythema; no oral lesions  Neck/Thyroid: neck supple; no thyromegaly  Cardiovascular: RRR, S1, S2, no S3 or murmur  Respiratory: lungs without crackles or wheezes  Abdomen: normoactive bowel sounds, soft, non encounter.       Meds This Visit:  Requested Prescriptions      No prescriptions requested or ordered in this encounter       Imaging & Referrals:  MRI SPINE LUMBAR (CPT=72148)  CT BRAIN (W+WO) (VHW=[id number])     9/10/2018  Aleja Yan MD

## 2018-09-12 RX ORDER — INSULIN GLARGINE 100 [IU]/ML
INJECTION, SOLUTION SUBCUTANEOUS
Qty: 15 ML | Refills: 1 | Status: SHIPPED | OUTPATIENT
Start: 2018-09-12 | End: 2019-03-20

## 2018-09-19 ENCOUNTER — TELEPHONE (OUTPATIENT)
Dept: INTERNAL MEDICINE CLINIC | Facility: CLINIC | Age: 83
End: 2018-09-19

## 2018-09-19 ENCOUNTER — HOSPITAL ENCOUNTER (OUTPATIENT)
Dept: MRI IMAGING | Facility: HOSPITAL | Age: 83
Discharge: HOME | End: 2018-09-19
Attending: INTERNAL MEDICINE
Payer: MEDICARE

## 2018-09-19 ENCOUNTER — HOSPITAL ENCOUNTER (OUTPATIENT)
Dept: CT IMAGING | Facility: HOSPITAL | Age: 83
Discharge: HOME | End: 2018-09-19
Attending: INTERNAL MEDICINE
Payer: MEDICARE

## 2018-09-19 DIAGNOSIS — M54.31 SCIATICA OF RIGHT SIDE: ICD-10-CM

## 2018-09-19 DIAGNOSIS — R27.0 ATAXIA: ICD-10-CM

## 2018-09-19 LAB — CREAT BLD-MCNC: 1 MG/DL (ref 0.5–1.5)

## 2018-09-19 PROCEDURE — 82565 ASSAY OF CREATININE: CPT

## 2018-09-19 PROCEDURE — 72148 MRI LUMBAR SPINE W/O DYE: CPT | Performed by: INTERNAL MEDICINE

## 2018-09-19 PROCEDURE — 70470 CT HEAD/BRAIN W/O & W/DYE: CPT | Performed by: INTERNAL MEDICINE

## 2018-09-20 NOTE — TELEPHONE ENCOUNTER
F/U OV 9/10/18; MRI lumbar spine on 9/19/18 shows L4-L5:   Broad-based disc bulge with superimposed right paracentral cranially directed disc extrusion. Extruded disc material measures up to 1.2 x 0.9 x 1.7 cm (transverse, AP, CC).  This results in asymmetr

## 2018-09-21 NOTE — TELEPHONE ENCOUNTER
Telephone call to patient and situation discussed. I have recommended to the patient that she see Dr. Juany Cox evaluation. I have given her his office telephone number and address.   Patient is to call me back if she cannot get in the centimeters too s

## 2018-09-27 ENCOUNTER — TELEPHONE (OUTPATIENT)
Dept: INTERNAL MEDICINE CLINIC | Facility: CLINIC | Age: 83
End: 2018-09-27

## 2018-09-27 NOTE — TELEPHONE ENCOUNTER
Daughter, Jah Medley, left voice mail message    Pt is in [place] with Keisha Levy tra strength tylenol is not enough for her back pain at night  Asks if something stronger can be given for pt to use at night   Pharmacy phone# [phone]    - pt has appt to se

## 2018-09-28 NOTE — TELEPHONE ENCOUNTER
Telephone call to patient's daughter, Vannesa Juarez, and situation discussed. I have called the the pharmacy in [address] with the patient is currently staying with her daughter.   I have have called in a prescription for tramadol 50 mg #7 to be taken 1 tablet at

## 2018-10-05 ENCOUNTER — APPOINTMENT (OUTPATIENT)
Dept: CT IMAGING | Facility: HOSPITAL | Age: 83
End: 2018-10-05
Attending: EMERGENCY MEDICINE
Payer: MEDICARE

## 2018-10-05 ENCOUNTER — HOSPITAL ENCOUNTER (EMERGENCY)
Facility: HOSPITAL | Age: 83
Discharge: HOME | End: 2018-10-05
Attending: EMERGENCY MEDICINE
Payer: MEDICARE

## 2018-10-05 VITALS
BODY MASS INDEX: 23.9 KG/M2 | WEIGHT: 140 LBS | TEMPERATURE: 98 F | RESPIRATION RATE: 16 BRPM | DIASTOLIC BLOOD PRESSURE: 76 MMHG | HEART RATE: 62 BPM | OXYGEN SATURATION: 98 % | SYSTOLIC BLOOD PRESSURE: 193 MMHG | HEIGHT: 64 IN
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
## 2018-10-05 DIAGNOSIS — S00.83XA TRAUMATIC HEMATOMA OF FOREHEAD, INITIAL ENCOUNTER: ICD-10-CM
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
## 2018-10-05 DIAGNOSIS — S02.92XA CLOSED FRACTURE OF FACIAL BONE DUE TO FALL, INITIAL ENCOUNTER (HCC): ICD-10-CM

## 2018-10-05 PROCEDURE — 70450 CT HEAD/BRAIN W/O DYE: CPT | Performed by: EMERGENCY MEDICINE

## 2018-10-05 PROCEDURE — 87086 URINE CULTURE/COLONY COUNT: CPT | Performed by: EMERGENCY MEDICINE

## 2018-10-05 PROCEDURE — 70486 CT MAXILLOFACIAL W/O DYE: CPT | Performed by: EMERGENCY MEDICINE

## 2018-10-05 PROCEDURE — 81001 URINALYSIS AUTO W/SCOPE: CPT | Performed by: EMERGENCY MEDICINE

## 2018-10-05 PROCEDURE — 72125 CT NECK SPINE W/O DYE: CPT | Performed by: EMERGENCY MEDICINE

## 2018-10-05 PROCEDURE — 99285 EMERGENCY DEPT VISIT HI MDM: CPT

## 2018-10-05 RX ORDER — TRAMADOL HYDROCHLORIDE 50 MG/1
50 TABLET ORAL EVERY 12 HOURS PRN
Qty: 6 TABLET | Refills: 0 | Status: SHIPPED | OUTPATIENT
Start: 2018-10-05 | End: 2018-10-08

## 2018-10-05 RX ORDER — DILTIAZEM HYDROCHLORIDE 240 MG/1
240 CAPSULE, COATED, EXTENDED RELEASE ORAL ONCE
Status: COMPLETED | OUTPATIENT
Start: 2018-10-05 | End: 2018-10-05

## 2018-10-05 RX ORDER — AMOXICILLIN 875 MG/1
875 TABLET, COATED ORAL 2 TIMES DAILY
Qty: 10 TABLET | Refills: 0 | Status: SHIPPED | OUTPATIENT
Start: 2018-10-05 | End: 2018-10-10

## 2018-10-05 NOTE — ED INITIAL ASSESSMENT (HPI)
Patient from [facility], arrived via EMS after a fall this morning. States she lost her balance when she was trying to sit down. Denied dizziness while on the floor but c/o dizziness upon sitting up. Kaylie Teague forward landing on her chest and right forehead.  He

## 2018-10-05 NOTE — ED PROVIDER NOTES
Patient Seen in: [facility] AND [facility] Emergency Department    History   Patient presents with:  Fall    Stated Complaint:  fall    HPI    79 yo F with PMH HTN, HL, CAD on baby ASA therapy presenting s/p mechanical fall.  Was attempting to sit in chair when she LUMPECTOMY RIGHT  No date: RADIATION  No date: TONSILLECTOMY  No date: UPPER GI ENDOSCOPY,DIAGNOSIS      Comment:  EGD    Medications :   LANTUS SOLOSTAR 100 UNIT/ML Subcutaneous Solution Pen-injector,  INJECT 10 UNITS INTO THE SKIN EVERY MORNING   CONTOUR Other    • Diabetes Other    • Hypertension Other        Social History    Tobacco Use      Smoking status: Never Smoker      Smokeless tobacco: Never Used    Alcohol use: No      Alcohol/week: 0.0 oz    Drug use: No      Review of Systems :  [place] COMPARISON: [facility], [facility]. [facility], [address] BRAIN (W+WO) (CPT=70470), 9/19/2018, 10:00. INDICATIONS: Fell and hit forehead. Denies LOC.   TECHNIQUE: CT images were obtained without contrast material.  Automated exposure control for dose reduction w mass, acute infarction, or significant atrophy. Scattered bilateral basal ganglia calcifications are redemonstrated. WHITE MATTER: Mild chronic white matter ischemic changes.   Prior lacunar infarct in the left the the ganglia adjacent to the anterior limb the patient's size. Use of iterative reconstruction technique  for dose reduction was used. FINDINGS:  CRANIOCERVICAL AREA:   Normal foramen magnum with no Chiari malformation. PARASPINAL AREA: Unremarkable. No mass or abnormal fluid collection.  No signi (gcj=[id number])    Result Date: 9/19/2018  PROCEDURE: MRI SPINE LUMBAR (CPT=72148)  COMPARISON: None. INDICATIONS: Lower back pain radiating down legs, worse in right, for 3-4 weeks. No trauma.   TECHNIQUE: A variety of imaging planes and parameters were utili Moderate foraminal narrowing. Thickening of the visualized endometrium. Recommend correlation with pelvic ultrasound to assess for endometrial hyperplasia or neoplasm.   Dictated by (CST): Luis Hilario MD on 9/19/2018 at 11:16     Approved by (CST): VAMSHI degenerative spondylolisthesis related to advanced facet arthrosis and moderate disc degeneration. 3. Otherwise pronounced degenerative changes in the lumbar spine. 4. Atherosclerotic vascular calcification. 5. Calcified degenerated uterine fibroids.      D Patricia Barraza MD on 10/05/2018 at 10:27          MDM     DIFFERENTIAL DIAGNOSIS: After history and physical exam differential diagnosis includes but is not limited to [address], skull/facial/cervical fracture, contusion.     Pulse ox: 98%:Normal on RA, as interpre Tab  Take 1 tablet (875 mg total) by mouth 2 (two) times daily for 5 days.   Qty: 10 tablet Refills: 0    TraMADol HCl 50 MG Oral Tab  Take 1 tablet (50 mg total) by mouth every 12 (twelve) hours as needed for Pain (Use caution while driving or operating ma

## 2018-10-05 NOTE — ED NOTES
Preparing for discharge to [facility] with family. Awake, alert and oriented. C/o some mild soreness to forehead hematoma. Dr Jarod Anne made aware of continued hypertension. Pt is due for her Diltiazem at 12noon, will request from pharmacy.  Ambulatory with wa

## 2018-10-08 ENCOUNTER — TELEPHONE (OUTPATIENT)
Dept: INTERNAL MEDICINE CLINIC | Facility: CLINIC | Age: 83
End: 2018-10-08

## 2018-10-08 ENCOUNTER — OFFICE VISIT (OUTPATIENT)
Dept: PAIN CLINIC | Facility: HOSPITAL | Age: 83
End: 2018-10-08
Attending: ANESTHESIOLOGY
Payer: MEDICARE

## 2018-10-08 ENCOUNTER — DOCUMENTATION ONLY (OUTPATIENT)
Dept: PAIN CLINIC | Facility: HOSPITAL | Age: 83
End: 2018-10-08

## 2018-10-08 VITALS — HEIGHT: 64 IN | WEIGHT: 140 LBS | BODY MASS INDEX: 23.9 KG/M2

## 2018-10-08 DIAGNOSIS — M54.16 SPINAL STENOSIS OF LUMBAR REGION WITH RADICULOPATHY: Primary | ICD-10-CM

## 2018-10-08 DIAGNOSIS — M48.061 SPINAL STENOSIS OF LUMBAR REGION WITH RADICULOPATHY: Primary | ICD-10-CM

## 2018-10-08 PROCEDURE — 99201 HC OUTPT EVAL AND MGNT NEW PT LEVEL 1: CPT

## 2018-10-08 NOTE — PROGRESS NOTES
10/8/18 PT PRESENTS AMBULATORY WITH [facility] TO PAIN CLINIC ACCOMPANIED BY SON, CHRIS. PT C/O OF LBP RADIATING TO RLE. PT FELL ON 10/5/18; STATES SHE HAS BEEN USING THE [facility] R/T PAIN WHICH CAUSES DIZZINESS/IMBALANCE.   PT SEEN FOR EVAL BY DR Jorge No; SEE DICT

## 2018-10-08 NOTE — TELEPHONE ENCOUNTER
Left message to call back. Patient seen in ER on 10/5.  From ER note:  Clinical Impression:  Accidental fall, initial encounter  (primary encounter diagnosis)  Traumatic hematoma of forehead, initial encounter  Closed fracture of facial bone due to fall, in

## 2018-10-08 NOTE — TELEPHONE ENCOUNTER
Son called. He states pt is doing well, but bruised. They will call if there are any problems. Routed to Dr. Elmer Bean.

## 2018-10-08 NOTE — TELEPHONE ENCOUNTER
Patient's son Dory Goldberg left message with service, patient fell last week and hit her head. Just wanted to advise Dr. Je Carrasquillo  Patient seen in ER 10/5/2018.     Routed to clinical.

## 2018-10-08 NOTE — CHRONIC PAIN
Initial Consultation Note      HISTORY OF PRESENT ILLNESS:  Sergo Luna is a 80year old old female referred to the pain clinic  for dilation treatment of her low back right leg pain Raineharper Hoanger is a very nice 25-year-old white female she is here today wi Rfl: 3   GLIMEPIRIDE 2 MG Oral Tab TAKE 1 TABLET BY MOUTH DAILY WITH BREAKFAST (Patient taking differently: Take 1 tablet PO daily with Breakfast) Disp: 90 tablet Rfl: 3   OMEPRAZOLE 20 MG Oral Capsule Delayed Release TAKE 1 CAPSULE BY MOUTH DAILY Disp: 90 CABG  No date: COLONOSCOPY  2009: DEBRIDEMENT OF NAILS, 6 OR MORE; Bilateral      Comment:  complete debridement of all 10 toenails  2013: DERMATOLOGICAL PROCEDURE;  Left      Comment:  excise lump on side of face -- improved  No date: ELECTROCARDIOGRAM, CO complication, not stated as uncontrolled    • Unspecified essential hypertension        FAMILY HISTORY:  Family History   Problem Relation Age of Onset   • Hypertension Mother    • Other (Other[other]) Mother    • Diabetes Brother    • Cancer Brother    • CTAB  Gait: Broad-based; cane user - Yes  Spine: Kyphosis    ROM:   Lumbar spine  Flexion  dec  Extension dec  Cervical Spine  Flexion   Extension  MOTOR EXAMINATION:  UPPER EXTREMITY      LEFT RIGHT   Deltoid 5/5 5/5   Biceps 5/5 5/5   Triceps 5/5 5/5   B paranasal sinuses are intact. Mastoid air cells are well aerated. ORBITS:                 Visualized orbits are unremarkable. CALVARIUM:     No acute osseous abnormality.   OTHER:                There is atherosclerotic calcification of the intracranial v carotid arteries and vertebral arteries. No abnormal meningeal or parenchymal enhancement. CONCLUSION:  1. No acute intracranial process. No abnormal enhancing lesion.  2.  There are mild microvascular white matter ischemic changes, likely related arches of C1 are intact  Moderate disc space narrowing C6-7  C2-C3: No significant disc/facet abnormality, spinal stenosis, or foraminal stenosis. C3-C4: No significant disc/facet abnormality, spinal stenosis, or foraminal stenosis.   C4-C5: No significant as L5-S1. L1-L2: No significant disc abnormality, central stenosis, or foraminal narrowing. L2-L3: There is a broad disc bulge. There is ligamentum flavum hypertrophy with mild central stenosis. Mild foraminal narrowing.  L3-L4: Broad disc bulge with post Mucosal thickening seen within the inferior margin of the right frontal sinus extending into the right frontoethmoidal recess.  FACIAL BONES: There is an acute nondisplaced fracture involving the right lateral floor of the frontal sinus extending into the s leg  Radiographic studies were negative as far as fractures      PLAN:  RECOMMENDATIONS:  Lumbar epidural steroid injection will get that done as soon as possible  Comprehensive analgesic plan was formulated.  Conservative vs. Aggressive measures were discu

## 2018-10-16 LAB
BUN: 15 MG/DL
CALCIUM: 9.2 MG/DL
CHLORIDE: 103 MEQ/L
CHOLESTEROL, TOTAL: 181 MG/DL
CREATININE, SERUM: 0.85 MG/DL
GLUCOSE: 209 MG/DL
HDL CHOLESTEROL: 50 MG/DL
LDL CHOLESTEROL: 116 MG/DL
NON-HDL CHOLESTEROL: 131 MG/DL
POTASSIUM, SERUM: 3.7 MEQ/L
SODIUM: 136 MEQ/L
TRIGLYCERIDES: 76 MG/DL

## 2018-10-17 ENCOUNTER — MYAURORA ACCOUNT LINK (OUTPATIENT)
Dept: OTHER | Age: 83
End: 2018-10-17

## 2018-10-17 ENCOUNTER — PRIOR ORIGINAL RECORDS (OUTPATIENT)
Dept: OTHER | Age: 83
End: 2018-10-17

## 2018-10-22 NOTE — TELEPHONE ENCOUNTER
Son - Karin Garcia calling   Pt is being seen at pain clinic today for an injection and it was recommended that her hematoma is drained  Who should pt see for this ?     Please call Karin Garcia [phone] to advise

## 2018-10-24 NOTE — TELEPHONE ENCOUNTER
Telephone call to pt's son, Luis Burt, and message left. Pt/Son should call to make an appt to see me so that I can assess what needs to be done. I will route this to nursing.   Thank  You!!

## 2018-10-26 NOTE — TELEPHONE ENCOUNTER
Message relayed to patient's son who verbalized understanding. Stated the patient has an appt with her dermatologist on Monday and he believes that they can take care of this in their office.  He will call back on Monday to schedule an appt with Dr. Kera Cates if ne

## 2018-11-06 ENCOUNTER — OFFICE VISIT (OUTPATIENT)
Dept: PAIN CLINIC | Facility: HOSPITAL | Age: 83
End: 2018-11-06
Attending: NURSE PRACTITIONER
Payer: MEDICARE

## 2018-11-06 DIAGNOSIS — M15.9 PRIMARY OSTEOARTHRITIS INVOLVING MULTIPLE JOINTS: Primary | ICD-10-CM

## 2018-11-06 PROCEDURE — 99211 OFF/OP EST MAY X REQ PHY/QHP: CPT

## 2018-11-06 NOTE — PROGRESS NOTES
Patient presents to [facility] ambulatory with walker for follow up lumbar epidural done 10-22. She had low back pain when she came here but before the injection fell forward and hit her head.   She believes something moved when she fell because since her fall sh

## 2018-11-06 NOTE — CHRONIC PAIN
Follow-up Note  CC: follow up  HISTORY OF PRESENT ILLNESS:  Lee Oliver is a 80year old old female, originally referred to the pain clinic by Dr. Nix ref.  provider found, with history of Primary osteoarthritis involving multiple joints  (primary enco Chew Tab Chew 1 tablet (81 mg total) by mouth daily.  Disp: 90 tablet Rfl: 0   Blood Gluc Meter Disp-Strips Does not apply Device OK to use Contour test strips 3 times per day Disp: 300 Device Rfl: 3   Calcium Carbonate-Vitamin D (OSCAL 500/200 D-3) 500-200 polyps 2003   • Esophageal reflux    • Esophagitis    • Heart attack ([facility] [address] 2016   • High blood pressure    • High cholesterol    • Hypothyroid    • Labyrinthitis    • Low blood potassium    • Lump on face 2013    on Left side of face.  Excision 2013   • Julissa Not on file      Transportation needs - medical: Not on file      Transportation needs - non-medical: Not on file    Occupational History      Not on file    Tobacco Use      Smoking status: Never Smoker      Smokeless tobacco: Never Used    Substance and including pharmacotherapy (eg. Anti- inflammatories, muscle relaxants, neuropathic medications, oral steroids, analgesics), injections, and further testing.  Risks and benefits of all options were discussed at length to patients satisfaction during a compre

## 2018-12-04 ENCOUNTER — LAB ENCOUNTER (OUTPATIENT)
Dept: LAB | Age: 83
End: 2018-12-04
Attending: INTERNAL MEDICINE
Payer: MEDICARE

## 2018-12-04 DIAGNOSIS — R53.83 FATIGUE, UNSPECIFIED TYPE: ICD-10-CM

## 2018-12-04 DIAGNOSIS — Z79.4 TYPE 2 DIABETES MELLITUS WITH DIABETIC POLYNEUROPATHY, WITH LONG-TERM CURRENT USE OF INSULIN (HCC): ICD-10-CM

## 2018-12-04 DIAGNOSIS — E78.00 HYPERCHOLESTEREMIA: ICD-10-CM

## 2018-12-04 DIAGNOSIS — E11.42 TYPE 2 DIABETES MELLITUS WITH DIABETIC POLYNEUROPATHY, WITH LONG-TERM CURRENT USE OF INSULIN (HCC): ICD-10-CM

## 2018-12-04 DIAGNOSIS — E55.9 VITAMIN D DEFICIENCY: ICD-10-CM

## 2018-12-04 DIAGNOSIS — Z00.00 ANNUAL PHYSICAL EXAM: ICD-10-CM

## 2018-12-04 PROCEDURE — 36415 COLL VENOUS BLD VENIPUNCTURE: CPT

## 2018-12-04 PROCEDURE — 83036 HEMOGLOBIN GLYCOSYLATED A1C: CPT

## 2018-12-04 PROCEDURE — 80061 LIPID PANEL: CPT

## 2018-12-04 PROCEDURE — 80053 COMPREHEN METABOLIC PANEL: CPT

## 2018-12-04 PROCEDURE — 82306 VITAMIN D 25 HYDROXY: CPT

## 2018-12-04 PROCEDURE — 85025 COMPLETE CBC W/AUTO DIFF WBC: CPT

## 2018-12-04 PROCEDURE — 84443 ASSAY THYROID STIM HORMONE: CPT

## 2018-12-04 PROCEDURE — 81001 URINALYSIS AUTO W/SCOPE: CPT

## 2018-12-07 ENCOUNTER — OFFICE VISIT (OUTPATIENT)
Dept: INTERNAL MEDICINE CLINIC | Facility: CLINIC | Age: 83
End: 2018-12-07
Payer: MEDICARE

## 2018-12-07 VITALS
TEMPERATURE: 98 F | WEIGHT: 138 LBS | HEIGHT: 64 IN | OXYGEN SATURATION: 99 % | DIASTOLIC BLOOD PRESSURE: 70 MMHG | SYSTOLIC BLOOD PRESSURE: 170 MMHG | BODY MASS INDEX: 23.56 KG/M2 | HEART RATE: 60 BPM

## 2018-12-07 DIAGNOSIS — C50.911 MALIGNANT NEOPLASM OF RIGHT FEMALE BREAST, UNSPECIFIED ESTROGEN RECEPTOR STATUS, UNSPECIFIED SITE OF BREAST (HCC): ICD-10-CM

## 2018-12-07 DIAGNOSIS — I25.10 ASHD (ARTERIOSCLEROTIC HEART DISEASE): ICD-10-CM

## 2018-12-07 DIAGNOSIS — R94.31 ABNORMAL EKG: ICD-10-CM

## 2018-12-07 DIAGNOSIS — E78.00 HYPERCHOLESTEREMIA: ICD-10-CM

## 2018-12-07 DIAGNOSIS — E11.42 TYPE 2 DIABETES MELLITUS WITH DIABETIC POLYNEUROPATHY, WITH LONG-TERM CURRENT USE OF INSULIN (HCC): ICD-10-CM

## 2018-12-07 DIAGNOSIS — K59.00 CONSTIPATION, UNSPECIFIED CONSTIPATION TYPE: ICD-10-CM

## 2018-12-07 DIAGNOSIS — Z79.4 TYPE 2 DIABETES MELLITUS WITH DIABETIC POLYNEUROPATHY, WITH LONG-TERM CURRENT USE OF INSULIN (HCC): ICD-10-CM

## 2018-12-07 DIAGNOSIS — K21.9 GASTROESOPHAGEAL REFLUX DISEASE WITHOUT ESOPHAGITIS: ICD-10-CM

## 2018-12-07 DIAGNOSIS — E03.9 HYPOTHYROIDISM, UNSPECIFIED TYPE: ICD-10-CM

## 2018-12-07 DIAGNOSIS — M15.9 PRIMARY OSTEOARTHRITIS INVOLVING MULTIPLE JOINTS: ICD-10-CM

## 2018-12-07 DIAGNOSIS — G62.9 PERIPHERAL POLYNEUROPATHY: ICD-10-CM

## 2018-12-07 DIAGNOSIS — N18.30 STAGE 3 CHRONIC KIDNEY DISEASE (HCC): ICD-10-CM

## 2018-12-07 DIAGNOSIS — Z00.00 ANNUAL PHYSICAL EXAM: Primary | ICD-10-CM

## 2018-12-07 DIAGNOSIS — Z95.1 S/P CABG X 3: ICD-10-CM

## 2018-12-07 DIAGNOSIS — E55.9 VITAMIN D DEFICIENCY: ICD-10-CM

## 2018-12-07 DIAGNOSIS — I73.9 PERIPHERAL VASCULAR DISEASE (HCC): ICD-10-CM

## 2018-12-07 DIAGNOSIS — I10 ESSENTIAL HYPERTENSION: ICD-10-CM

## 2018-12-07 PROCEDURE — G0439 PPPS, SUBSEQ VISIT: HCPCS | Performed by: INTERNAL MEDICINE

## 2018-12-07 PROCEDURE — G0463 HOSPITAL OUTPT CLINIC VISIT: HCPCS | Performed by: INTERNAL MEDICINE

## 2018-12-07 PROCEDURE — 99214 OFFICE O/P EST MOD 30 MIN: CPT | Performed by: INTERNAL MEDICINE

## 2018-12-07 NOTE — PROGRESS NOTES
HPI:   Sergo Luna is a 80year old female who was seen by me on December 7, 2018 for her Medicare annual physical examination. At the time of examination Mr. Cindy Aldridge was feeling well. She is seen today with her son.   Patient was recently in [address] [place] [address] BY MOUTH AT BEDTIME Disp: 90 tablet Rfl: 3   Lisinopril-Hydrochlorothiazide 20-25 MG Oral Tab Take 2 tablets by mouth daily. Disp:  Rfl: 1   Vitamin C 500 MG Oral Tab Take 500 mg by mouth daily.  Disp:  Rfl:    Glucose Blood (CHINMAY CONTOUR NEXT TEST VI) T 2009    complete debridement of all 10 toenails   • DERMATOLOGICAL PROCEDURE Left 2013    excise lump on side of face -- improved   • ELECTROCARDIOGRAM, COMPLETE      Scanned to media tab - DOS 12-   • HEART CORONARY ARTERY BYPASS GRAFT N/A 11/11/20 dominant or suspicious mass, no axillary LAD. Patient's right breast is status post a lumpectomy. Patient's breasts otherwise appear normal.  There are no breast masses noted.   LUNGS: clear to auscultation  CARDIO: RRR, normal S1S2, no murmurs  GI: Protu above. Patient's son requested a referral for the patient to go to a diabetic classes with him at [facility].  I did give the patient an order for this. - BASIC METABOLIC PANEL (8); Future  - HEMOGLOBIN A1C; Future    5.  Essential hypertension 25-HYDROXY; Future      Fareed Morrow MD  12/7/2018  5:52 PM        General Health     In the past six months, have you lost more than 10 pounds without trying?: 2 - No    Has your appetite been poor?: No    Type of Diet: Diabetic    How does the angel at all    Feeling down, depressed, or hopeless (over the last two weeks)?: Not at all    PHQ-2 SCORE: 0        Advance Directives     Do you have a healthcare power of ?: Yes    Do you have a living will?: Yes     Hearing Assessment (Required for A list are recommended by your physician but may not be covered, or covered at this frequency, by your insurer. Please check with your insurance carrier before scheduling to verify coverage.    PREVENTATIVE SERVICES  INDICATIONS AND SCHEDULE Internal Lab or P applicable    Pneumococcal No orders found for this or any previous visit. Update Immunization Activity if applicable    Hepatitis B No orders found for this or any previous visit.  Update Immunization Activity if applicable    Tetanus Orders placed or perf

## 2018-12-07 NOTE — PATIENT INSTRUCTIONS
1.  Patient is to continue her current diet, medication and activity. 2.  I will plan see the patient back in 3 months with blood tests as ordered. 3.  I will see the patient back sooner as necessary.   4.  Patient to take vitamin D 1000 units orally daniella

## 2019-01-23 RX ORDER — BLOOD SUGAR DIAGNOSTIC
STRIP MISCELLANEOUS
Qty: 300 EACH | Refills: 99 | Status: SHIPPED | OUTPATIENT
Start: 2019-01-23 | End: 2020-03-06

## 2019-01-23 RX ORDER — PRAVASTATIN SODIUM 40 MG
TABLET ORAL
Qty: 90 TABLET | Refills: 3 | Status: SHIPPED | OUTPATIENT
Start: 2019-01-23 | End: 2020-02-13

## 2019-01-23 NOTE — TELEPHONE ENCOUNTER
Pt is traveling next week, requesting refill for:  Pravastatin    Pt also requesting addt'l test strips for while she is traveling.   Contour Next, Qty 200    Tasked to [employer]

## 2019-02-22 NOTE — TELEPHONE ENCOUNTER
Refill request is for a maintenance medication and has met the criteria specified in the Ambulatory Medication Refill Standing Order for eligibility, visits, laboratory, alerts and was sent to the requested pharmacy. no

## 2019-02-28 VITALS
BODY MASS INDEX: 22.66 KG/M2 | HEART RATE: 60 BPM | WEIGHT: 136 LBS | SYSTOLIC BLOOD PRESSURE: 160 MMHG | HEIGHT: 65 IN | DIASTOLIC BLOOD PRESSURE: 64 MMHG

## 2019-02-28 VITALS
SYSTOLIC BLOOD PRESSURE: 144 MMHG | BODY MASS INDEX: 23.49 KG/M2 | HEART RATE: 61 BPM | DIASTOLIC BLOOD PRESSURE: 56 MMHG | WEIGHT: 141 LBS | HEIGHT: 65 IN | OXYGEN SATURATION: 98 %

## 2019-02-28 VITALS
SYSTOLIC BLOOD PRESSURE: 140 MMHG | BODY MASS INDEX: 23.32 KG/M2 | RESPIRATION RATE: 18 BRPM | HEIGHT: 65 IN | HEART RATE: 73 BPM | WEIGHT: 140 LBS | OXYGEN SATURATION: 96 % | DIASTOLIC BLOOD PRESSURE: 60 MMHG

## 2019-03-01 VITALS
HEART RATE: 60 BPM | WEIGHT: 137 LBS | BODY MASS INDEX: 22.82 KG/M2 | RESPIRATION RATE: 16 BRPM | SYSTOLIC BLOOD PRESSURE: 166 MMHG | HEIGHT: 65 IN | DIASTOLIC BLOOD PRESSURE: 82 MMHG | OXYGEN SATURATION: 97 %

## 2019-03-01 VITALS
OXYGEN SATURATION: 97 % | DIASTOLIC BLOOD PRESSURE: 88 MMHG | WEIGHT: 135 LBS | RESPIRATION RATE: 18 BRPM | HEART RATE: 88 BPM | HEIGHT: 65 IN | SYSTOLIC BLOOD PRESSURE: 182 MMHG | BODY MASS INDEX: 22.49 KG/M2

## 2019-03-01 VITALS
BODY MASS INDEX: 21.66 KG/M2 | HEIGHT: 65 IN | DIASTOLIC BLOOD PRESSURE: 82 MMHG | HEART RATE: 75 BPM | OXYGEN SATURATION: 95 % | SYSTOLIC BLOOD PRESSURE: 124 MMHG | RESPIRATION RATE: 16 BRPM | WEIGHT: 130 LBS

## 2019-03-04 ENCOUNTER — APPOINTMENT (OUTPATIENT)
Dept: LAB | Age: 84
End: 2019-03-04
Attending: INTERNAL MEDICINE
Payer: MEDICARE

## 2019-03-04 DIAGNOSIS — E11.42 TYPE 2 DIABETES MELLITUS WITH DIABETIC POLYNEUROPATHY, WITH LONG-TERM CURRENT USE OF INSULIN (HCC): ICD-10-CM

## 2019-03-04 DIAGNOSIS — Z79.4 TYPE 2 DIABETES MELLITUS WITH DIABETIC POLYNEUROPATHY, WITH LONG-TERM CURRENT USE OF INSULIN (HCC): ICD-10-CM

## 2019-03-04 DIAGNOSIS — E78.00 HYPERCHOLESTEREMIA: ICD-10-CM

## 2019-03-04 DIAGNOSIS — E55.9 VITAMIN D DEFICIENCY: ICD-10-CM

## 2019-03-04 LAB
25(OH)D3 SERPL-MCNC: 34 NG/ML (ref 30–100)
ALT SERPL-CCNC: 23 U/L (ref 13–56)
ANION GAP SERPL CALC-SCNC: 5 MMOL/L (ref 0–18)
AST SERPL-CCNC: 20 U/L (ref 15–37)
BUN BLD-MCNC: 24 MG/DL (ref 7–18)
BUN/CREAT SERPL: 24.2 (ref 10–20)
CALCIUM BLD-MCNC: 9.1 MG/DL (ref 8.5–10.1)
CHLORIDE SERPL-SCNC: 110 MMOL/L (ref 98–107)
CHOLEST SMN-MCNC: 190 MG/DL (ref ?–200)
CO2 SERPL-SCNC: 28 MMOL/L (ref 21–32)
CREAT BLD-MCNC: 0.99 MG/DL (ref 0.55–1.02)
EST. AVERAGE GLUCOSE BLD GHB EST-MCNC: 180 MG/DL (ref 68–126)
GLUCOSE BLD-MCNC: 164 MG/DL (ref 70–99)
HBA1C MFR BLD HPLC: 7.9 % (ref ?–5.7)
HDLC SERPL-MCNC: 56 MG/DL (ref 40–59)
LDLC SERPL CALC-MCNC: 115 MG/DL (ref ?–100)
NONHDLC SERPL-MCNC: 134 MG/DL (ref ?–130)
OSMOLALITY SERPL CALC.SUM OF ELEC: 304 MOSM/KG (ref 275–295)
POTASSIUM SERPL-SCNC: 4.1 MMOL/L (ref 3.5–5.1)
SODIUM SERPL-SCNC: 143 MMOL/L (ref 136–145)
TRIGL SERPL-MCNC: 96 MG/DL (ref 30–149)
VLDLC SERPL CALC-MCNC: 19 MG/DL (ref 0–30)

## 2019-03-04 PROCEDURE — 83036 HEMOGLOBIN GLYCOSYLATED A1C: CPT

## 2019-03-04 PROCEDURE — 82306 VITAMIN D 25 HYDROXY: CPT

## 2019-03-04 PROCEDURE — 80048 BASIC METABOLIC PNL TOTAL CA: CPT

## 2019-03-04 PROCEDURE — 84460 ALANINE AMINO (ALT) (SGPT): CPT

## 2019-03-04 PROCEDURE — 36415 COLL VENOUS BLD VENIPUNCTURE: CPT

## 2019-03-04 PROCEDURE — 80061 LIPID PANEL: CPT

## 2019-03-04 PROCEDURE — 84450 TRANSFERASE (AST) (SGOT): CPT

## 2019-03-08 ENCOUNTER — OFFICE VISIT (OUTPATIENT)
Dept: INTERNAL MEDICINE CLINIC | Facility: CLINIC | Age: 84
End: 2019-03-08
Payer: MEDICARE

## 2019-03-08 ENCOUNTER — APPOINTMENT (OUTPATIENT)
Dept: LAB | Age: 84
End: 2019-03-08
Attending: INTERNAL MEDICINE
Payer: MEDICARE

## 2019-03-08 VITALS
HEIGHT: 64 IN | DIASTOLIC BLOOD PRESSURE: 60 MMHG | BODY MASS INDEX: 24.07 KG/M2 | HEART RATE: 68 BPM | TEMPERATURE: 98 F | WEIGHT: 141 LBS | SYSTOLIC BLOOD PRESSURE: 150 MMHG

## 2019-03-08 DIAGNOSIS — K59.00 CONSTIPATION, UNSPECIFIED CONSTIPATION TYPE: ICD-10-CM

## 2019-03-08 DIAGNOSIS — N39.0 URINARY TRACT INFECTION WITHOUT HEMATURIA, SITE UNSPECIFIED: ICD-10-CM

## 2019-03-08 DIAGNOSIS — K21.9 GASTROESOPHAGEAL REFLUX DISEASE WITHOUT ESOPHAGITIS: ICD-10-CM

## 2019-03-08 DIAGNOSIS — E55.9 VITAMIN D DEFICIENCY: ICD-10-CM

## 2019-03-08 DIAGNOSIS — E78.00 HYPERCHOLESTEREMIA: ICD-10-CM

## 2019-03-08 DIAGNOSIS — E03.9 HYPOTHYROIDISM, UNSPECIFIED TYPE: ICD-10-CM

## 2019-03-08 DIAGNOSIS — E11.42 TYPE 2 DIABETES MELLITUS WITH DIABETIC POLYNEUROPATHY, WITH LONG-TERM CURRENT USE OF INSULIN (HCC): ICD-10-CM

## 2019-03-08 DIAGNOSIS — C50.911 MALIGNANT NEOPLASM OF RIGHT FEMALE BREAST, UNSPECIFIED ESTROGEN RECEPTOR STATUS, UNSPECIFIED SITE OF BREAST (HCC): ICD-10-CM

## 2019-03-08 DIAGNOSIS — N18.30 STAGE 3 CHRONIC KIDNEY DISEASE (HCC): ICD-10-CM

## 2019-03-08 DIAGNOSIS — Z95.1 S/P CABG X 3: ICD-10-CM

## 2019-03-08 DIAGNOSIS — I25.10 ASHD (ARTERIOSCLEROTIC HEART DISEASE): Primary | ICD-10-CM

## 2019-03-08 DIAGNOSIS — Z79.4 TYPE 2 DIABETES MELLITUS WITH DIABETIC POLYNEUROPATHY, WITH LONG-TERM CURRENT USE OF INSULIN (HCC): ICD-10-CM

## 2019-03-08 DIAGNOSIS — I10 ESSENTIAL HYPERTENSION: ICD-10-CM

## 2019-03-08 DIAGNOSIS — I73.9 PERIPHERAL VASCULAR DISEASE (HCC): ICD-10-CM

## 2019-03-08 DIAGNOSIS — G62.9 PERIPHERAL POLYNEUROPATHY: ICD-10-CM

## 2019-03-08 DIAGNOSIS — M15.9 PRIMARY OSTEOARTHRITIS INVOLVING MULTIPLE JOINTS: ICD-10-CM

## 2019-03-08 LAB
BACTERIA UR QL AUTO: NEGATIVE /HPF
BILIRUB UR QL: NEGATIVE
CLARITY UR: CLEAR
COLOR UR: YELLOW
GLUCOSE UR-MCNC: NEGATIVE MG/DL
HGB UR QL STRIP.AUTO: NEGATIVE
KETONES UR-MCNC: NEGATIVE MG/DL
NITRITE UR QL STRIP.AUTO: NEGATIVE
PH UR: 5 [PH] (ref 5–8)
PROT UR-MCNC: NEGATIVE MG/DL
RBC #/AREA URNS AUTO: <1 /HPF
SP GR UR STRIP: 1.02 (ref 1–1.03)
UROBILINOGEN UR STRIP-ACNC: <2
VIT C UR-MCNC: 40 MG/DL
WBC #/AREA URNS AUTO: 1 /HPF

## 2019-03-08 PROCEDURE — G0463 HOSPITAL OUTPT CLINIC VISIT: HCPCS | Performed by: INTERNAL MEDICINE

## 2019-03-08 PROCEDURE — 87086 URINE CULTURE/COLONY COUNT: CPT

## 2019-03-08 PROCEDURE — G0009 ADMIN PNEUMOCOCCAL VACCINE: HCPCS | Performed by: INTERNAL MEDICINE

## 2019-03-08 PROCEDURE — 99214 OFFICE O/P EST MOD 30 MIN: CPT | Performed by: INTERNAL MEDICINE

## 2019-03-08 PROCEDURE — 81001 URINALYSIS AUTO W/SCOPE: CPT

## 2019-03-08 PROCEDURE — 90732 PPSV23 VACC 2 YRS+ SUBQ/IM: CPT | Performed by: INTERNAL MEDICINE

## 2019-03-08 NOTE — PATIENT INSTRUCTIONS
1.  Patient is to continue her current diet, medications and activity. 2.  Patient was given her Pneumovax 23 booster today.   3, I will see the patient back in 3 months with blood tests which will include a BMP, hemoglobin A1c, lipid panel, AST, ALT and v

## 2019-03-08 NOTE — PROGRESS NOTES
Ghassan Nazario is a 80year old female. Patient presents with:  Checkup: 3 month  Diabetes  Ashd  Hypertension  Hyperlipidemia    HPI:   Patient presents with:  Checkup: 3 month  Diabetes  Ashd  Hypertension  Hyperlipidemia    Patient feels well.   She is Glucose Blood (CHINMAY CONTOUR NEXT TEST VI) TEST BLOOD SUGAR THREE TIMES DAILY AS DIRECTED Disp:  Rfl: 99   aspirin 81 MG Oral Chew Tab Chew 1 tablet (81 mg total) by mouth daily.  Disp: 90 tablet Rfl: 0   Blood Gluc Meter Disp-Strips Does not apply Device constipation  :No Urinary complaints  EXT:No complaints of pain or swelling in patient's legs    EXAM:   /60 (BP Location: Right arm, Patient Position: Sitting, Cuff Size: adult)   Pulse 68   Temp 97.6 °F (36.4 °C) (Oral)   Ht 5' 4\" (1.626 m)   Wt Patient's recent BUN was 24, creatinine 0.99 and GFR was 51. CPM.  As above. 11. Peripheral polyneuropathy  Stable.   CPM.    12. Malignant neoplasm of right female breast, unspecified estrogen receptor status, unspecified site of breast ([facility] [address]  Doing we

## 2019-03-10 ENCOUNTER — TELEPHONE (OUTPATIENT)
Dept: INTERNAL MEDICINE CLINIC | Facility: CLINIC | Age: 84
End: 2019-03-10

## 2019-03-11 ENCOUNTER — TELEPHONE (OUTPATIENT)
Dept: INTERNAL MEDICINE CLINIC | Facility: CLINIC | Age: 84
End: 2019-03-11

## 2019-03-11 NOTE — TELEPHONE ENCOUNTER
Patient received pneumovax 23 on 3/8/19 to left deltoid. Over the weekend the area was red and painful. However, patient now reports that that has resolved. Denies redness, pain or swelling.  Advised to continue to monitor site and call if sx return

## 2019-03-11 NOTE — TELEPHONE ENCOUNTER
Spoke with pt to relay MD message on other task. Pt reports, \"Pain and Redness at Injection Site\" where Influenza Vaccine was given. No fever. No chills. \"Just not feeling well, difficulty sleeping\".     Requested RN call back

## 2019-03-11 NOTE — TELEPHONE ENCOUNTER
Please call pt and notify her that her recent U/A and Urine C+S have turned out well. No UTI was noted. I will route this to nursing.   Thank you!!

## 2019-03-13 RX ORDER — LISINOPRIL AND HYDROCHLOROTHIAZIDE 25; 20 MG/1; MG/1
1 TABLET ORAL 2 TIMES DAILY
COMMUNITY
Start: 2018-07-09 | End: 2019-03-20 | Stop reason: SDUPTHER

## 2019-03-13 RX ORDER — MULTIVIT WITH MINERALS/LUTEIN
1 TABLET ORAL DAILY
COMMUNITY

## 2019-03-13 RX ORDER — POTASSIUM CHLORIDE 1500 MG/1
TABLET, EXTENDED RELEASE ORAL
COMMUNITY
Start: 2016-12-28

## 2019-03-13 RX ORDER — DILTIAZEM HYDROCHLORIDE 240 MG/1
1 CAPSULE, COATED, EXTENDED RELEASE ORAL DAILY
COMMUNITY
Start: 2017-03-24

## 2019-03-13 RX ORDER — GLIMEPIRIDE 2 MG/1
1 TABLET ORAL
COMMUNITY
Start: 2017-03-08 | End: 2020-11-09 | Stop reason: ALTCHOICE

## 2019-03-13 RX ORDER — OMEPRAZOLE 20 MG/1
1 CAPSULE, DELAYED RELEASE ORAL DAILY
COMMUNITY
Start: 2016-11-25 | End: 2019-10-07 | Stop reason: ALTCHOICE

## 2019-03-13 RX ORDER — MULTIVITAMIN WITH IRON
100 TABLET ORAL
COMMUNITY

## 2019-03-13 RX ORDER — RIBOFLAVIN (VITAMIN B2) 100 MG
TABLET ORAL
COMMUNITY

## 2019-03-13 RX ORDER — LEVOTHYROXINE SODIUM 0.05 MG/1
TABLET ORAL
COMMUNITY
End: 2019-10-07 | Stop reason: ALTCHOICE

## 2019-03-13 RX ORDER — PRAVASTATIN SODIUM 40 MG
1 TABLET ORAL DAILY
COMMUNITY
Start: 2016-11-25

## 2019-03-13 RX ORDER — CALCIUM CARBONATE 500(1250)
1 TABLET ORAL DAILY
COMMUNITY

## 2019-03-20 RX ORDER — LISINOPRIL AND HYDROCHLOROTHIAZIDE 25; 20 MG/1; MG/1
TABLET ORAL
Qty: 180 TABLET | Refills: 2 | Status: SHIPPED | OUTPATIENT
Start: 2019-03-20 | End: 2019-12-16 | Stop reason: SDUPTHER

## 2019-03-22 RX ORDER — OMEPRAZOLE 20 MG/1
CAPSULE, DELAYED RELEASE ORAL
Qty: 90 CAPSULE | Refills: 3 | Status: SHIPPED | OUTPATIENT
Start: 2019-03-22 | End: 2019-06-14

## 2019-03-22 RX ORDER — GLIMEPIRIDE 2 MG/1
TABLET ORAL
Qty: 90 TABLET | Refills: 3 | Status: SHIPPED | OUTPATIENT
Start: 2019-03-22 | End: 2020-03-09

## 2019-03-22 RX ORDER — LEVOTHYROXINE SODIUM 0.05 MG/1
TABLET ORAL
Qty: 90 TABLET | Refills: 3 | Status: SHIPPED | OUTPATIENT
Start: 2019-03-22 | End: 2020-02-19

## 2019-03-22 RX ORDER — INSULIN GLARGINE 100 [IU]/ML
INJECTION, SOLUTION SUBCUTANEOUS
Qty: 15 ML | Refills: 1 | Status: SHIPPED | OUTPATIENT
Start: 2019-03-22 | End: 2019-12-20

## 2019-03-22 RX ORDER — POTASSIUM CHLORIDE 20 MEQ/1
TABLET, EXTENDED RELEASE ORAL
Qty: 90 TABLET | Refills: 3 | Status: SHIPPED | OUTPATIENT
Start: 2019-03-22 | End: 2020-03-30

## 2019-03-24 RX ORDER — DILTIAZEM HYDROCHLORIDE 240 MG/1
CAPSULE, COATED, EXTENDED RELEASE ORAL
Qty: 90 CAPSULE | Refills: 3 | Status: SHIPPED | OUTPATIENT
Start: 2019-03-24 | End: 2020-02-19

## 2019-03-27 PROBLEM — I10 HYPERTENSION: Status: ACTIVE | Noted: 2019-03-27

## 2019-03-27 PROBLEM — E78.00 HYPERCHOLESTEROLEMIA: Status: ACTIVE | Noted: 2019-03-27

## 2019-03-27 PROBLEM — I25.10 CAD (CORONARY ARTERY DISEASE): Status: ACTIVE | Noted: 2019-03-27

## 2019-03-27 PROBLEM — E03.9 HYPOTHYROIDISM: Status: ACTIVE | Noted: 2019-03-27

## 2019-03-27 PROBLEM — E11.9 DIABETES MELLITUS TYPE 2, CONTROLLED (CMD): Status: ACTIVE | Noted: 2019-03-27

## 2019-03-27 PROBLEM — Z95.1 HX OF CABG: Status: ACTIVE | Noted: 2019-03-27

## 2019-03-27 RX ORDER — LEVOTHYROXINE SODIUM 50 UG/1
CAPSULE ORAL
COMMUNITY
Start: 2016-11-25 | End: 2020-05-20

## 2019-04-01 ENCOUNTER — OFFICE VISIT (OUTPATIENT)
Dept: CARDIOLOGY | Age: 84
End: 2019-04-01

## 2019-04-01 VITALS
WEIGHT: 139 LBS | DIASTOLIC BLOOD PRESSURE: 64 MMHG | BODY MASS INDEX: 23.73 KG/M2 | HEART RATE: 63 BPM | HEIGHT: 64 IN | OXYGEN SATURATION: 98 % | SYSTOLIC BLOOD PRESSURE: 164 MMHG

## 2019-04-01 DIAGNOSIS — I25.10 CORONARY ARTERY DISEASE INVOLVING NATIVE CORONARY ARTERY OF NATIVE HEART WITHOUT ANGINA PECTORIS: Primary | ICD-10-CM

## 2019-04-01 DIAGNOSIS — I10 BENIGN HYPERTENSION: ICD-10-CM

## 2019-04-01 DIAGNOSIS — E11.59 CONTROLLED TYPE 2 DIABETES MELLITUS WITH OTHER CIRCULATORY COMPLICATION, WITHOUT LONG-TERM CURRENT USE OF INSULIN (CMD): ICD-10-CM

## 2019-04-01 DIAGNOSIS — Z95.1 HX OF CABG: ICD-10-CM

## 2019-04-01 DIAGNOSIS — E78.00 HYPERCHOLESTEROLEMIA: ICD-10-CM

## 2019-04-01 PROCEDURE — 99214 OFFICE O/P EST MOD 30 MIN: CPT | Performed by: INTERNAL MEDICINE

## 2019-06-06 ENCOUNTER — APPOINTMENT (OUTPATIENT)
Dept: LAB | Age: 84
End: 2019-06-06
Attending: INTERNAL MEDICINE
Payer: MEDICARE

## 2019-06-06 DIAGNOSIS — Z79.4 TYPE 2 DIABETES MELLITUS WITH DIABETIC POLYNEUROPATHY, WITH LONG-TERM CURRENT USE OF INSULIN (HCC): ICD-10-CM

## 2019-06-06 DIAGNOSIS — E55.9 VITAMIN D DEFICIENCY: ICD-10-CM

## 2019-06-06 DIAGNOSIS — E78.00 HYPERCHOLESTEREMIA: ICD-10-CM

## 2019-06-06 DIAGNOSIS — N18.30 STAGE 3 CHRONIC KIDNEY DISEASE (HCC): ICD-10-CM

## 2019-06-06 DIAGNOSIS — E11.42 TYPE 2 DIABETES MELLITUS WITH DIABETIC POLYNEUROPATHY, WITH LONG-TERM CURRENT USE OF INSULIN (HCC): ICD-10-CM

## 2019-06-06 PROCEDURE — 84450 TRANSFERASE (AST) (SGOT): CPT

## 2019-06-06 PROCEDURE — 83036 HEMOGLOBIN GLYCOSYLATED A1C: CPT

## 2019-06-06 PROCEDURE — 80048 BASIC METABOLIC PNL TOTAL CA: CPT

## 2019-06-06 PROCEDURE — 80061 LIPID PANEL: CPT

## 2019-06-06 PROCEDURE — 36415 COLL VENOUS BLD VENIPUNCTURE: CPT

## 2019-06-06 PROCEDURE — 84460 ALANINE AMINO (ALT) (SGPT): CPT

## 2019-06-06 PROCEDURE — 82306 VITAMIN D 25 HYDROXY: CPT

## 2019-06-14 ENCOUNTER — OFFICE VISIT (OUTPATIENT)
Dept: INTERNAL MEDICINE CLINIC | Facility: CLINIC | Age: 84
End: 2019-06-14
Payer: MEDICARE

## 2019-06-14 VITALS
BODY MASS INDEX: 24.41 KG/M2 | WEIGHT: 143 LBS | HEIGHT: 64 IN | HEART RATE: 60 BPM | DIASTOLIC BLOOD PRESSURE: 64 MMHG | OXYGEN SATURATION: 99 % | TEMPERATURE: 98 F | SYSTOLIC BLOOD PRESSURE: 150 MMHG

## 2019-06-14 DIAGNOSIS — C50.911 MALIGNANT NEOPLASM OF RIGHT FEMALE BREAST, UNSPECIFIED ESTROGEN RECEPTOR STATUS, UNSPECIFIED SITE OF BREAST (HCC): ICD-10-CM

## 2019-06-14 DIAGNOSIS — R53.83 FATIGUE, UNSPECIFIED TYPE: ICD-10-CM

## 2019-06-14 DIAGNOSIS — K59.00 CONSTIPATION, UNSPECIFIED CONSTIPATION TYPE: ICD-10-CM

## 2019-06-14 DIAGNOSIS — E03.9 HYPOTHYROIDISM, UNSPECIFIED TYPE: ICD-10-CM

## 2019-06-14 DIAGNOSIS — K21.9 GASTROESOPHAGEAL REFLUX DISEASE WITHOUT ESOPHAGITIS: ICD-10-CM

## 2019-06-14 DIAGNOSIS — E11.42 TYPE 2 DIABETES MELLITUS WITH DIABETIC POLYNEUROPATHY, WITH LONG-TERM CURRENT USE OF INSULIN (HCC): Primary | ICD-10-CM

## 2019-06-14 DIAGNOSIS — E78.00 HYPERCHOLESTEREMIA: ICD-10-CM

## 2019-06-14 DIAGNOSIS — N18.30 STAGE 3 CHRONIC KIDNEY DISEASE (HCC): ICD-10-CM

## 2019-06-14 DIAGNOSIS — Z79.4 TYPE 2 DIABETES MELLITUS WITH DIABETIC POLYNEUROPATHY, WITH LONG-TERM CURRENT USE OF INSULIN (HCC): Primary | ICD-10-CM

## 2019-06-14 DIAGNOSIS — Z95.1 S/P CABG X 3: ICD-10-CM

## 2019-06-14 DIAGNOSIS — I73.9 PERIPHERAL VASCULAR DISEASE (HCC): ICD-10-CM

## 2019-06-14 DIAGNOSIS — M15.9 PRIMARY OSTEOARTHRITIS INVOLVING MULTIPLE JOINTS: ICD-10-CM

## 2019-06-14 DIAGNOSIS — E55.9 VITAMIN D DEFICIENCY: ICD-10-CM

## 2019-06-14 DIAGNOSIS — I10 ESSENTIAL HYPERTENSION: ICD-10-CM

## 2019-06-14 DIAGNOSIS — I25.10 ASHD (ARTERIOSCLEROTIC HEART DISEASE): ICD-10-CM

## 2019-06-14 DIAGNOSIS — G62.9 PERIPHERAL POLYNEUROPATHY: ICD-10-CM

## 2019-06-14 PROCEDURE — 99214 OFFICE O/P EST MOD 30 MIN: CPT | Performed by: INTERNAL MEDICINE

## 2019-06-14 PROCEDURE — G0463 HOSPITAL OUTPT CLINIC VISIT: HCPCS | Performed by: INTERNAL MEDICINE

## 2019-06-14 NOTE — PROGRESS NOTES
Omar Gibbons is a 80year old female. Patient presents with:  Checkup: 3 mo f/u  Diabetes  Ashd  Hypertension  Hyperlipidemia    HPI:   Patient presents with:  Checkup: 3 mo f/u  Diabetes  Ashd  Hypertension  Hyperlipidemia    Presents for evaluation w aspirin 81 MG Oral Chew Tab Chew 1 tablet (81 mg total) by mouth daily.  Disp: 90 tablet Rfl: 0   Blood Gluc Meter Disp-Strips Does not apply Device OK to use Contour test strips 3 times per day Disp: 300 Device Rfl: 3   Calcium Carbonate-Vitamin D (OSCAL Location: Left arm, Patient Position: Sitting, Cuff Size: adult)   Pulse 60   Temp 97.9 °F (36.6 °C)   Ht 5' 4\" (1.626 m)   Wt 143 lb (64.9 kg)   LMP  (LMP Unknown)   SpO2 99%   BMI 24.55 kg/m²   GENERAL: Elderly white femaleIn no acute distress  HEENT: n polyneuropathy  Stable. CPM.    9. Gastroesophageal reflux disease without esophagitis  Doing well.  CPM.  Patient has been on omeprazole for a while. I will stop the patient's omeprazole.   Patient may use an antacid such as Tums or liquid antacid like G

## 2019-06-14 NOTE — PATIENT INSTRUCTIONS
1.  Patient is to continue her current diet, medication and activity. 2.  Patient is to stop her omeprazole. 3.  Patient may use Tums or liquid antacid such as Gaviscon or generic Maalox or Mylanta as necessary for reflux symptoms.   Patient may also cons

## 2019-07-01 ENCOUNTER — TELEPHONE (OUTPATIENT)
Dept: INTERNAL MEDICINE CLINIC | Facility: CLINIC | Age: 84
End: 2019-07-01

## 2019-07-01 NOTE — TELEPHONE ENCOUNTER
I spoke with patient and she is having some constipation for the past three days. She has a history of fecal impaction. She tried a suppository two days ago and had a small loose bowel movement. She is passing some small hard stools.  She started back on he

## 2019-07-01 NOTE — TELEPHONE ENCOUNTER
Telephone call to patient and situation discussed. Patient is having a bad time with constipation. I recommend that she try MiraLAX as prescribed in 8 ounces of water either once or twice a day. Patient get the MiraLAX over-the-counter at the pharmacy.

## 2019-07-01 NOTE — TELEPHONE ENCOUNTER
Pt. Has been having constipation very bad for the past 3 days ph.  # [phone]   Routed to clinical

## 2019-07-02 NOTE — TELEPHONE ENCOUNTER
Spoke Rafa from [facility]'s [facility] who reports they have fleet enemas OTC, no order needed. Spoke to patient and relayed MD message and instructions, patient verbalizes understanding.  Patient reports she has done enemas in the past and thinks s

## 2019-07-02 NOTE — TELEPHONE ENCOUNTER
Pt took Miralax but it did not help  Took a suppository and had \"3 little hard knots\"    Pt has had this problem before but it hasn't lasted this long  Asks if she should see a GI doctor ?     Requests call back from nursing today  [phone]

## 2019-07-02 NOTE — TELEPHONE ENCOUNTER
Spoke to patient who reports she tried taking the Miralax last night as well as today with no relief. She tried a suppository today which helped a little but she still was only able to get out \"4-5 hard knots\". She reports her stool was dark.  She states

## 2019-07-02 NOTE — TELEPHONE ENCOUNTER
May be a fleets enema would help to loosen things up, I do not know if she can self administer this, but if she cannot, she may have to make a trip to the emergency room for disimpaction.   If she needs an order for a fleets enema sent in, make it a one-wallace

## 2019-07-03 ENCOUNTER — APPOINTMENT (OUTPATIENT)
Dept: GENERAL RADIOLOGY | Facility: HOSPITAL | Age: 84
End: 2019-07-03
Attending: EMERGENCY MEDICINE
Payer: MEDICARE

## 2019-07-03 ENCOUNTER — HOSPITAL ENCOUNTER (EMERGENCY)
Facility: HOSPITAL | Age: 84
Discharge: HOME | End: 2019-07-03
Attending: EMERGENCY MEDICINE
Payer: MEDICARE

## 2019-07-03 VITALS
HEART RATE: 64 BPM | TEMPERATURE: 98 F | RESPIRATION RATE: 18 BRPM | WEIGHT: 143.06 LBS | SYSTOLIC BLOOD PRESSURE: 167 MMHG | DIASTOLIC BLOOD PRESSURE: 77 MMHG | BODY MASS INDEX: 25 KG/M2 | OXYGEN SATURATION: 97 %

## 2019-07-03 DIAGNOSIS — K59.00 CONSTIPATION, UNSPECIFIED CONSTIPATION TYPE: Primary | ICD-10-CM

## 2019-07-03 LAB
ANION GAP SERPL CALC-SCNC: 8 MMOL/L (ref 0–18)
BASOPHILS # BLD AUTO: 0.06 X10(3) UL (ref 0–0.2)
BASOPHILS NFR BLD AUTO: 1.1 %
BUN BLD-MCNC: 17 MG/DL (ref 7–18)
BUN/CREAT SERPL: 17.7 (ref 10–20)
CALCIUM BLD-MCNC: 9.1 MG/DL (ref 8.5–10.1)
CHLORIDE SERPL-SCNC: 102 MMOL/L (ref 98–112)
CO2 SERPL-SCNC: 28 MMOL/L (ref 21–32)
CREAT BLD-MCNC: 0.96 MG/DL (ref 0.55–1.02)
DEPRECATED RDW RBC AUTO: 40.8 FL (ref 35.1–46.3)
EOSINOPHIL # BLD AUTO: 0.45 X10(3) UL (ref 0–0.7)
EOSINOPHIL NFR BLD AUTO: 8.6 %
ERYTHROCYTE [DISTWIDTH] IN BLOOD BY AUTOMATED COUNT: 11.9 % (ref 11–15)
GLUCOSE BLD-MCNC: 226 MG/DL (ref 70–99)
HAV IGM SER QL: 2 MG/DL (ref 1.6–2.6)
HCT VFR BLD AUTO: 36.6 % (ref 35–48)
HGB BLD-MCNC: 12 G/DL (ref 12–16)
IMM GRANULOCYTES # BLD AUTO: 0.01 X10(3) UL (ref 0–1)
IMM GRANULOCYTES NFR BLD: 0.2 %
LYMPHOCYTES # BLD AUTO: 1.04 X10(3) UL (ref 1–4)
LYMPHOCYTES NFR BLD AUTO: 19.8 %
MCH RBC QN AUTO: 31 PG (ref 26–34)
MCHC RBC AUTO-ENTMCNC: 32.8 G/DL (ref 31–37)
MCV RBC AUTO: 94.6 FL (ref 80–100)
MONOCYTES # BLD AUTO: 0.46 X10(3) UL (ref 0.1–1)
MONOCYTES NFR BLD AUTO: 8.8 %
NEUTROPHILS # BLD AUTO: 3.22 X10 (3) UL (ref 1.5–7.7)
NEUTROPHILS # BLD AUTO: 3.22 X10(3) UL (ref 1.5–7.7)
NEUTROPHILS NFR BLD AUTO: 61.5 %
OSMOLALITY SERPL CALC.SUM OF ELEC: 295 MOSM/KG (ref 275–295)
PLATELET # BLD AUTO: 144 10(3)UL (ref 150–450)
POTASSIUM SERPL-SCNC: 3.7 MMOL/L (ref 3.5–5.1)
RBC # BLD AUTO: 3.87 X10(6)UL (ref 3.8–5.3)
SODIUM SERPL-SCNC: 138 MMOL/L (ref 136–145)
WBC # BLD AUTO: 5.2 X10(3) UL (ref 4–11)

## 2019-07-03 PROCEDURE — 74019 RADEX ABDOMEN 2 VIEWS: CPT | Performed by: EMERGENCY MEDICINE

## 2019-07-03 PROCEDURE — 80048 BASIC METABOLIC PNL TOTAL CA: CPT | Performed by: EMERGENCY MEDICINE

## 2019-07-03 PROCEDURE — 83735 ASSAY OF MAGNESIUM: CPT | Performed by: EMERGENCY MEDICINE

## 2019-07-03 PROCEDURE — 36415 COLL VENOUS BLD VENIPUNCTURE: CPT

## 2019-07-03 PROCEDURE — 85025 COMPLETE CBC W/AUTO DIFF WBC: CPT | Performed by: EMERGENCY MEDICINE

## 2019-07-03 PROCEDURE — 99283 EMERGENCY DEPT VISIT LOW MDM: CPT

## 2019-07-03 RX ORDER — MAGNESIUM CARB/ALUMINUM HYDROX 105-160MG
296 TABLET,CHEWABLE ORAL ONCE
Status: COMPLETED | OUTPATIENT
Start: 2019-07-03 | End: 2019-07-03

## 2019-07-03 NOTE — ED PROVIDER NOTES
Patient Seen in: [facility] AND [facility] Emergency Department    History   Patient presents with:  Constipation (gastrointestinal)    Stated Complaint: constipation    HPI    43-year-old female presents for complaint of abdominal pain and constipation.   Sharee 12-   • HEART CORONARY ARTERY BYPASS GRAFT N/A 11/11/2016    Performed by Tasha Frausto MD at [address] MAIN OR   • KNEE REPLACEMENT SURGERY Right 2008   • LUMPECTOMY RIGHT     • RADIATION     • TONSILLECTOMY     • UPPER GI ENDOSCOPY,DIAGNOSIS no edema or tenderness. Neurological: She is alert and oriented to person, place, and time. No cranial nerve deficit. Coordination normal.   Skin: Skin is warm and dry. Psychiatric: She has a normal mood and affect.  Her behavior is normal.   Nursing no gas pattern. Moderate feces in the ascending and descending colon. No marked constipation is noted. FREE AIR:   None. SOFT TISSUES: Normal. No masses or organomegaly.  CALCIFICATIONS: Multiple calcifications within the hemipelvis may represent calcified

## 2019-07-03 NOTE — TELEPHONE ENCOUNTER
Spoke with patient who reports \"nothing was cutting it\" last night so she had her son take her to the ER this morning.  She reports they had her drink \"some liquid\" (mag citrate) and told her it would take 3-4 hours to work, she did not want manual disi

## 2019-07-03 NOTE — TELEPHONE ENCOUNTER
Attempted to call patient to see how she is doing/if she used enema, phone rang, no answer and then went to busy signal. Will try again at a later time.

## 2019-07-03 NOTE — ED NOTES
Pt states luq abd pain, denies n/v.  States the pcp not doing anything and d/t age and pain, they wanted her evaluated.

## 2019-07-03 NOTE — ED INITIAL ASSESSMENT (HPI)
Pt here for constipation x1 week. Taking miralax, could not do enema.   Per sons, they brought her here d/t pain

## 2019-07-08 RX ORDER — POLYETHYLENE GLYCOL 3350 17 G/17G
17 POWDER, FOR SOLUTION ORAL DAILY
COMMUNITY
End: 2021-01-13 | Stop reason: ALTCHOICE

## 2019-07-08 NOTE — TELEPHONE ENCOUNTER
Patient states when she indicated to MD on 7/3 that her bowels were starting to move, she was referring to a small loose stool she had. Since then she's had a small bowel movement -last night. Describes as \"small curds\".  She has been taking miralax in th

## 2019-07-08 NOTE — TELEPHONE ENCOUNTER
Pt. Akash Hills to the ER and she still hasn't had a normal bowel movement she is still constipated and has diarrhea as well. She is taking murilax in the am and ducolase at night.  She wants to know what to do  Ph. # [phone]  Routed high to clinical

## 2019-07-08 NOTE — TELEPHONE ENCOUNTER
Noted. Please note that the message below should say that no one picked up the phone and the phone stopped ringing.

## 2019-07-08 NOTE — TELEPHONE ENCOUNTER
S/w who states that she missed the phone call because she was having dinner-lives in an independent living building. I relayed MDs message/instructions. She will try the Mag citrate as directed. Son bought some OTC. Will call if no BM.

## 2019-07-08 NOTE — TELEPHONE ENCOUNTER
Attempted to reach patient. Phone kept ringing and no answering machine picked up.   Nursing to try again

## 2019-07-08 NOTE — TELEPHONE ENCOUNTER
Noted.  I have tried to call the patient back on 2 occasions and the phone rang but no one picked up and on the phone stop drinking.   Please call the patient and notify her that it is a good idea to try the magnesium citrate again to see if that will get t

## 2019-09-17 ENCOUNTER — TELEPHONE (OUTPATIENT)
Dept: INTERNAL MEDICINE CLINIC | Facility: CLINIC | Age: 84
End: 2019-09-17

## 2019-09-17 ENCOUNTER — APPOINTMENT (OUTPATIENT)
Dept: LAB | Age: 84
End: 2019-09-17
Attending: INTERNAL MEDICINE
Payer: MEDICARE

## 2019-09-17 DIAGNOSIS — E78.00 HYPERCHOLESTEREMIA: ICD-10-CM

## 2019-09-17 DIAGNOSIS — E11.42 TYPE 2 DIABETES MELLITUS WITH DIABETIC POLYNEUROPATHY, WITH LONG-TERM CURRENT USE OF INSULIN (HCC): ICD-10-CM

## 2019-09-17 DIAGNOSIS — Z79.4 TYPE 2 DIABETES MELLITUS WITH DIABETIC POLYNEUROPATHY, WITH LONG-TERM CURRENT USE OF INSULIN (HCC): ICD-10-CM

## 2019-09-17 DIAGNOSIS — N18.30 STAGE 3 CHRONIC KIDNEY DISEASE (HCC): ICD-10-CM

## 2019-09-17 LAB
ALT SERPL-CCNC: 20 U/L
ALT SERPL-CCNC: 20 U/L (ref 13–56)
ANION GAP SERPL CALC-SCNC: 7 MMOL/L
ANION GAP SERPL CALC-SCNC: 7 MMOL/L (ref 0–18)
AST SERPL-CCNC: 21 U/L
AST SERPL-CCNC: 21 U/L (ref 15–37)
BUN BLD-MCNC: 21 MG/DL (ref 7–18)
BUN SERPL-MCNC: 21 MG/DL
BUN/CREAT SERPL: 19.8
BUN/CREAT SERPL: 19.8 (ref 10–20)
CALCIUM BLD-MCNC: 9.6 MG/DL (ref 8.5–10.1)
CALCIUM SERPL-MCNC: 9.6 MG/DL
CHLORIDE SERPL-SCNC: 105 MMOL/L
CHLORIDE SERPL-SCNC: 105 MMOL/L (ref 98–112)
CHOLEST SERPL-MCNC: 179 MG/DL
CHOLEST SMN-MCNC: 179 MG/DL (ref ?–200)
CHOLEST/HDLC SERPL: NORMAL {RATIO}
CO2 SERPL-SCNC: 29 MMOL/L
CO2 SERPL-SCNC: 29 MMOL/L (ref 21–32)
CREAT BLD-MCNC: 1.06 MG/DL (ref 0.55–1.02)
CREAT SERPL-MCNC: 1.06 MG/DL
EST. AVERAGE GLUCOSE BLD GHB EST-MCNC: 192 MG/DL (ref 68–126)
GLUCOSE BLD-MCNC: 229 MG/DL (ref 70–99)
GLUCOSE SERPL-MCNC: 229 MG/DL
HBA1C MFR BLD HPLC: 8.3 % (ref ?–5.7)
HDLC SERPL-MCNC: 58 MG/DL
HDLC SERPL-MCNC: 58 MG/DL (ref 40–59)
LDLC SERPL CALC-MCNC: 107 MG/DL
LDLC SERPL CALC-MCNC: 107 MG/DL (ref ?–100)
LENGTH OF FAST TIME PATIENT: NORMAL H
LENGTH OF FAST TIME PATIENT: NORMAL H
NONHDLC SERPL-MCNC: 121 MG/DL
NONHDLC SERPL-MCNC: 121 MG/DL (ref ?–130)
OSMOLALITY SERPL CALC.SUM OF ELEC: 302 MOSM/KG (ref 275–295)
PATIENT FASTING: YES
PATIENT FASTING: YES
POTASSIUM SERPL-SCNC: 4.1 MMOL/L
POTASSIUM SERPL-SCNC: 4.1 MMOL/L (ref 3.5–5.1)
SODIUM SERPL-SCNC: 141 MMOL/L
SODIUM SERPL-SCNC: 141 MMOL/L (ref 136–145)
TRIGL SERPL-MCNC: 71 MG/DL
TRIGL SERPL-MCNC: 71 MG/DL (ref 30–149)
VLDLC SERPL CALC-MCNC: 14 MG/DL
VLDLC SERPL CALC-MCNC: 14 MG/DL (ref 0–30)

## 2019-09-17 PROCEDURE — 84460 ALANINE AMINO (ALT) (SGPT): CPT

## 2019-09-17 PROCEDURE — 80048 BASIC METABOLIC PNL TOTAL CA: CPT

## 2019-09-17 PROCEDURE — 80061 LIPID PANEL: CPT

## 2019-09-17 PROCEDURE — 36415 COLL VENOUS BLD VENIPUNCTURE: CPT

## 2019-09-17 PROCEDURE — 83036 HEMOGLOBIN GLYCOSYLATED A1C: CPT

## 2019-09-17 PROCEDURE — 84450 TRANSFERASE (AST) (SGOT): CPT

## 2019-09-23 ENCOUNTER — OFFICE VISIT (OUTPATIENT)
Dept: INTERNAL MEDICINE CLINIC | Facility: CLINIC | Age: 84
End: 2019-09-23
Payer: MEDICARE

## 2019-09-23 VITALS
DIASTOLIC BLOOD PRESSURE: 70 MMHG | OXYGEN SATURATION: 98 % | HEART RATE: 72 BPM | HEIGHT: 62 IN | TEMPERATURE: 98 F | SYSTOLIC BLOOD PRESSURE: 140 MMHG | WEIGHT: 147.38 LBS | BODY MASS INDEX: 27.12 KG/M2

## 2019-09-23 DIAGNOSIS — I25.10 ASHD (ARTERIOSCLEROTIC HEART DISEASE): ICD-10-CM

## 2019-09-23 DIAGNOSIS — I10 ESSENTIAL HYPERTENSION: ICD-10-CM

## 2019-09-23 DIAGNOSIS — I73.9 PERIPHERAL VASCULAR DISEASE (HCC): ICD-10-CM

## 2019-09-23 DIAGNOSIS — R53.83 FATIGUE, UNSPECIFIED TYPE: ICD-10-CM

## 2019-09-23 DIAGNOSIS — K21.9 GASTROESOPHAGEAL REFLUX DISEASE WITHOUT ESOPHAGITIS: ICD-10-CM

## 2019-09-23 DIAGNOSIS — M15.9 PRIMARY OSTEOARTHRITIS INVOLVING MULTIPLE JOINTS: ICD-10-CM

## 2019-09-23 DIAGNOSIS — Z00.00 ANNUAL PHYSICAL EXAM: ICD-10-CM

## 2019-09-23 DIAGNOSIS — E78.00 HYPERCHOLESTEREMIA: ICD-10-CM

## 2019-09-23 DIAGNOSIS — E11.42 TYPE 2 DIABETES MELLITUS WITH DIABETIC POLYNEUROPATHY, WITH LONG-TERM CURRENT USE OF INSULIN (HCC): Primary | ICD-10-CM

## 2019-09-23 DIAGNOSIS — E55.9 VITAMIN D DEFICIENCY: ICD-10-CM

## 2019-09-23 DIAGNOSIS — C50.911 MALIGNANT NEOPLASM OF RIGHT FEMALE BREAST, UNSPECIFIED ESTROGEN RECEPTOR STATUS, UNSPECIFIED SITE OF BREAST (HCC): ICD-10-CM

## 2019-09-23 DIAGNOSIS — Z79.4 TYPE 2 DIABETES MELLITUS WITH DIABETIC POLYNEUROPATHY, WITH LONG-TERM CURRENT USE OF INSULIN (HCC): Primary | ICD-10-CM

## 2019-09-23 DIAGNOSIS — G62.9 PERIPHERAL POLYNEUROPATHY: ICD-10-CM

## 2019-09-23 DIAGNOSIS — N18.30 STAGE 3 CHRONIC KIDNEY DISEASE (HCC): ICD-10-CM

## 2019-09-23 DIAGNOSIS — Z95.1 S/P CABG X 3: ICD-10-CM

## 2019-09-23 DIAGNOSIS — K59.00 CONSTIPATION, UNSPECIFIED CONSTIPATION TYPE: ICD-10-CM

## 2019-09-23 DIAGNOSIS — E03.9 HYPOTHYROIDISM, UNSPECIFIED TYPE: ICD-10-CM

## 2019-09-23 PROCEDURE — 99214 OFFICE O/P EST MOD 30 MIN: CPT | Performed by: INTERNAL MEDICINE

## 2019-09-23 PROCEDURE — G0463 HOSPITAL OUTPT CLINIC VISIT: HCPCS | Performed by: INTERNAL MEDICINE

## 2019-09-23 NOTE — PATIENT INSTRUCTIONS
1.  Patient is to continue her current diet, medication and activity. 2.  Patient is to watch her diet more closely. 3.  Patient will get a flu vaccine from her family's company.   4.  I will plan to see the patient back in 3 months with blood test, pearl

## 2019-09-23 NOTE — PROGRESS NOTES
Nicole Kraus is a 80year old female. Patient presents with:  Checkup: Type 2 DM pt here today for checkup; c/o chronic constipation w/abdominal pain  Diabetes  Ashd  Hypertension  Hyperlipidemia    HPI:   Patient presents with:  Checkup: Type 2 DM pt Glucose Blood (CHINMAY CONTOUR NEXT TEST VI) TEST BLOOD SUGAR THREE TIMES DAILY AS DIRECTED Disp:  Rfl: 99   aspirin 81 MG Oral Chew Tab Chew 1 tablet (81 mg total) by mouth daily.  Disp: 90 tablet Rfl: 0   Blood Gluc Meter Disp-Strips Does not apply Device constipation  :No Urinary complaints  EXT:No complaints of pain or swelling in patient's legs    EXAM:   /70 (BP Location: Left arm, Patient Position: Sitting, Cuff Size: adult)   Pulse 72   Temp 97.5 °F (36.4 °C) (Oral)   Ht 5' 2\" (1.575 m)   Wt CPM.    9. Gastroesophageal reflux disease without esophagitis  Stable. CPM.    10. Constipation, unspecified constipation type  Patient continues to have difficulty with constipation.   I recommended to the patient that she increase her MiraLAX to twice a

## 2019-10-03 RX ORDER — BISACODYL 5 MG/1
5 TABLET, DELAYED RELEASE ORAL
COMMUNITY
End: 2020-11-09

## 2019-10-07 ENCOUNTER — OFFICE VISIT (OUTPATIENT)
Dept: CARDIOLOGY | Age: 84
End: 2019-10-07

## 2019-10-07 VITALS
BODY MASS INDEX: 24.92 KG/M2 | OXYGEN SATURATION: 95 % | DIASTOLIC BLOOD PRESSURE: 56 MMHG | HEIGHT: 64 IN | WEIGHT: 146 LBS | SYSTOLIC BLOOD PRESSURE: 160 MMHG | HEART RATE: 55 BPM

## 2019-10-07 DIAGNOSIS — Z95.1 HX OF CABG: ICD-10-CM

## 2019-10-07 DIAGNOSIS — I10 BENIGN HYPERTENSION: ICD-10-CM

## 2019-10-07 DIAGNOSIS — I25.10 CORONARY ARTERY DISEASE INVOLVING NATIVE CORONARY ARTERY OF NATIVE HEART WITHOUT ANGINA PECTORIS: Primary | ICD-10-CM

## 2019-10-07 DIAGNOSIS — E78.00 HYPERCHOLESTEROLEMIA: ICD-10-CM

## 2019-10-07 PROCEDURE — 99214 OFFICE O/P EST MOD 30 MIN: CPT | Performed by: INTERNAL MEDICINE

## 2019-12-10 ENCOUNTER — LAB ENCOUNTER (OUTPATIENT)
Dept: LAB | Age: 84
End: 2019-12-10
Attending: INTERNAL MEDICINE
Payer: MEDICARE

## 2019-12-10 DIAGNOSIS — E78.00 HYPERCHOLESTEREMIA: ICD-10-CM

## 2019-12-10 DIAGNOSIS — E03.9 HYPOTHYROIDISM, UNSPECIFIED TYPE: ICD-10-CM

## 2019-12-10 DIAGNOSIS — Z79.4 TYPE 2 DIABETES MELLITUS WITH DIABETIC POLYNEUROPATHY, WITH LONG-TERM CURRENT USE OF INSULIN (HCC): ICD-10-CM

## 2019-12-10 DIAGNOSIS — Z00.00 ANNUAL PHYSICAL EXAM: ICD-10-CM

## 2019-12-10 DIAGNOSIS — E11.42 TYPE 2 DIABETES MELLITUS WITH DIABETIC POLYNEUROPATHY, WITH LONG-TERM CURRENT USE OF INSULIN (HCC): ICD-10-CM

## 2019-12-10 DIAGNOSIS — E55.9 VITAMIN D DEFICIENCY: ICD-10-CM

## 2019-12-10 DIAGNOSIS — N18.30 STAGE 3 CHRONIC KIDNEY DISEASE (HCC): ICD-10-CM

## 2019-12-10 PROCEDURE — 80061 LIPID PANEL: CPT

## 2019-12-10 PROCEDURE — 84443 ASSAY THYROID STIM HORMONE: CPT

## 2019-12-10 PROCEDURE — 80053 COMPREHEN METABOLIC PANEL: CPT

## 2019-12-10 PROCEDURE — 82306 VITAMIN D 25 HYDROXY: CPT

## 2019-12-10 PROCEDURE — 85025 COMPLETE CBC W/AUTO DIFF WBC: CPT

## 2019-12-10 PROCEDURE — 81001 URINALYSIS AUTO W/SCOPE: CPT

## 2019-12-10 PROCEDURE — 83036 HEMOGLOBIN GLYCOSYLATED A1C: CPT

## 2019-12-10 PROCEDURE — 36415 COLL VENOUS BLD VENIPUNCTURE: CPT

## 2019-12-16 ENCOUNTER — OFFICE VISIT (OUTPATIENT)
Dept: INTERNAL MEDICINE CLINIC | Facility: CLINIC | Age: 84
End: 2019-12-16
Payer: MEDICARE

## 2019-12-16 VITALS
HEIGHT: 62 IN | WEIGHT: 146 LBS | SYSTOLIC BLOOD PRESSURE: 156 MMHG | BODY MASS INDEX: 26.87 KG/M2 | TEMPERATURE: 98 F | HEART RATE: 60 BPM | DIASTOLIC BLOOD PRESSURE: 70 MMHG | OXYGEN SATURATION: 97 %

## 2019-12-16 DIAGNOSIS — M15.9 PRIMARY OSTEOARTHRITIS INVOLVING MULTIPLE JOINTS: ICD-10-CM

## 2019-12-16 DIAGNOSIS — Z79.4 TYPE 2 DIABETES MELLITUS WITH DIABETIC POLYNEUROPATHY, WITH LONG-TERM CURRENT USE OF INSULIN (HCC): ICD-10-CM

## 2019-12-16 DIAGNOSIS — R94.31 ABNORMAL EKG: ICD-10-CM

## 2019-12-16 DIAGNOSIS — C50.911 MALIGNANT NEOPLASM OF RIGHT FEMALE BREAST, UNSPECIFIED ESTROGEN RECEPTOR STATUS, UNSPECIFIED SITE OF BREAST (HCC): ICD-10-CM

## 2019-12-16 DIAGNOSIS — G62.9 PERIPHERAL POLYNEUROPATHY: ICD-10-CM

## 2019-12-16 DIAGNOSIS — I73.9 PERIPHERAL VASCULAR DISEASE (HCC): ICD-10-CM

## 2019-12-16 DIAGNOSIS — E03.9 HYPOTHYROIDISM, UNSPECIFIED TYPE: ICD-10-CM

## 2019-12-16 DIAGNOSIS — Z00.00 ANNUAL PHYSICAL EXAM: Primary | ICD-10-CM

## 2019-12-16 DIAGNOSIS — I25.10 ASHD (ARTERIOSCLEROTIC HEART DISEASE): ICD-10-CM

## 2019-12-16 DIAGNOSIS — Z95.1 S/P CABG X 3: ICD-10-CM

## 2019-12-16 DIAGNOSIS — E78.00 HYPERCHOLESTEREMIA: ICD-10-CM

## 2019-12-16 DIAGNOSIS — E11.42 TYPE 2 DIABETES MELLITUS WITH DIABETIC POLYNEUROPATHY, WITH LONG-TERM CURRENT USE OF INSULIN (HCC): ICD-10-CM

## 2019-12-16 DIAGNOSIS — N18.30 STAGE 3 CHRONIC KIDNEY DISEASE (HCC): ICD-10-CM

## 2019-12-16 DIAGNOSIS — E55.9 VITAMIN D DEFICIENCY: ICD-10-CM

## 2019-12-16 DIAGNOSIS — I10 ESSENTIAL HYPERTENSION: ICD-10-CM

## 2019-12-16 DIAGNOSIS — R53.83 FATIGUE, UNSPECIFIED TYPE: ICD-10-CM

## 2019-12-16 DIAGNOSIS — K59.00 CONSTIPATION, UNSPECIFIED CONSTIPATION TYPE: ICD-10-CM

## 2019-12-16 DIAGNOSIS — D69.6 THROMBOCYTOPENIA (HCC): ICD-10-CM

## 2019-12-16 DIAGNOSIS — K21.9 GASTROESOPHAGEAL REFLUX DISEASE WITHOUT ESOPHAGITIS: ICD-10-CM

## 2019-12-16 PROCEDURE — G0439 PPPS, SUBSEQ VISIT: HCPCS | Performed by: INTERNAL MEDICINE

## 2019-12-16 PROCEDURE — G0463 HOSPITAL OUTPT CLINIC VISIT: HCPCS | Performed by: INTERNAL MEDICINE

## 2019-12-16 PROCEDURE — 93005 ELECTROCARDIOGRAM TRACING: CPT | Performed by: INTERNAL MEDICINE

## 2019-12-16 PROCEDURE — 93010 ELECTROCARDIOGRAM REPORT: CPT | Performed by: INTERNAL MEDICINE

## 2019-12-16 PROCEDURE — 99214 OFFICE O/P EST MOD 30 MIN: CPT | Performed by: INTERNAL MEDICINE

## 2019-12-16 RX ORDER — LISINOPRIL AND HYDROCHLOROTHIAZIDE 25; 20 MG/1; MG/1
TABLET ORAL
Qty: 180 TABLET | Refills: 2 | Status: SHIPPED | OUTPATIENT
Start: 2019-12-16 | End: 2020-09-18

## 2019-12-16 NOTE — PATIENT INSTRUCTIONS
1.  Patient is to continue her current diet, medication and activity. 2.  Patient is to take MiraLAX 17 g in 8 ounces of water daily to help treat her constipation. Patient may take a second dose later in the day if necessary.   3.  Patient is to increase

## 2019-12-20 NOTE — TELEPHONE ENCOUNTER
See ov note 12/16/19. Dr. Lata Varela increased Lantus to 12 units every morning.     Refill request is for a maintenance medication and has met the criteria specified in the Ambulatory Medication Refill Standing Order for eligibility, visits, laboratory, alerts and

## 2020-01-24 RX ORDER — PRAVASTATIN SODIUM 40 MG
TABLET ORAL
Qty: 90 TABLET | Refills: 2 | OUTPATIENT
Start: 2020-01-24

## 2020-01-24 RX ORDER — GLIMEPIRIDE 2 MG/1
TABLET ORAL
Qty: 90 TABLET | Refills: 2 | OUTPATIENT
Start: 2020-01-24

## 2020-01-24 RX ORDER — DILTIAZEM HYDROCHLORIDE 240 MG/1
CAPSULE, COATED, EXTENDED RELEASE ORAL
Qty: 90 CAPSULE | Refills: 2 | OUTPATIENT
Start: 2020-01-24

## 2020-01-24 NOTE — TELEPHONE ENCOUNTER
Current refill request refused due to refill is either a duplicate request or has active refills at the pharmacy. Check previous templates.     Requested Prescriptions     Refused Prescriptions Disp Refills   • PRAVASTATIN SODIUM 40 MG Oral Tab [Pharmacy M

## 2020-02-13 RX ORDER — PRAVASTATIN SODIUM 40 MG
40 TABLET ORAL NIGHTLY
Qty: 90 TABLET | Refills: 3 | Status: SHIPPED | OUTPATIENT
Start: 2020-02-13 | End: 2021-02-15

## 2020-02-19 RX ORDER — PRAVASTATIN SODIUM 40 MG
TABLET ORAL
Qty: 90 TABLET | Refills: 3 | OUTPATIENT
Start: 2020-02-19

## 2020-02-19 RX ORDER — LEVOTHYROXINE SODIUM 0.05 MG/1
50 TABLET ORAL
Qty: 90 TABLET | Refills: 1 | Status: SHIPPED | OUTPATIENT
Start: 2020-02-19 | End: 2020-03-09 | Stop reason: DRUGHIGH

## 2020-02-19 RX ORDER — DILTIAZEM HYDROCHLORIDE 240 MG/1
240 CAPSULE, COATED, EXTENDED RELEASE ORAL DAILY
Qty: 90 CAPSULE | Refills: 1 | Status: SHIPPED | OUTPATIENT
Start: 2020-02-19 | End: 2020-11-17

## 2020-03-03 ENCOUNTER — LAB ENCOUNTER (OUTPATIENT)
Dept: LAB | Age: 85
End: 2020-03-03
Attending: INTERNAL MEDICINE
Payer: MEDICARE

## 2020-03-03 DIAGNOSIS — N18.30 STAGE 3 CHRONIC KIDNEY DISEASE (HCC): ICD-10-CM

## 2020-03-03 DIAGNOSIS — E78.00 HYPERCHOLESTEREMIA: ICD-10-CM

## 2020-03-03 DIAGNOSIS — R53.83 FATIGUE, UNSPECIFIED TYPE: ICD-10-CM

## 2020-03-03 DIAGNOSIS — E03.9 HYPOTHYROIDISM, UNSPECIFIED TYPE: ICD-10-CM

## 2020-03-03 DIAGNOSIS — D69.6 THROMBOCYTOPENIA (HCC): ICD-10-CM

## 2020-03-03 DIAGNOSIS — E11.42 TYPE 2 DIABETES MELLITUS WITH DIABETIC POLYNEUROPATHY, WITH LONG-TERM CURRENT USE OF INSULIN (HCC): ICD-10-CM

## 2020-03-03 DIAGNOSIS — Z79.4 TYPE 2 DIABETES MELLITUS WITH DIABETIC POLYNEUROPATHY, WITH LONG-TERM CURRENT USE OF INSULIN (HCC): ICD-10-CM

## 2020-03-03 LAB
ALT SERPL-CCNC: 21 U/L (ref 13–56)
ANION GAP SERPL CALC-SCNC: 4 MMOL/L (ref 0–18)
AST SERPL-CCNC: 19 U/L (ref 15–37)
BASOPHILS # BLD AUTO: 0.09 X10(3) UL (ref 0–0.2)
BASOPHILS NFR BLD AUTO: 1.5 %
BUN BLD-MCNC: 22 MG/DL (ref 7–18)
BUN/CREAT SERPL: 19.3 (ref 10–20)
CALCIUM BLD-MCNC: 9.7 MG/DL (ref 8.5–10.1)
CHLORIDE SERPL-SCNC: 104 MMOL/L (ref 98–112)
CHOLEST SMN-MCNC: 185 MG/DL (ref ?–200)
CO2 SERPL-SCNC: 30 MMOL/L (ref 21–32)
CREAT BLD-MCNC: 1.14 MG/DL (ref 0.55–1.02)
DEPRECATED RDW RBC AUTO: 40.2 FL (ref 35.1–46.3)
EOSINOPHIL # BLD AUTO: 0.48 X10(3) UL (ref 0–0.7)
EOSINOPHIL NFR BLD AUTO: 7.8 %
ERYTHROCYTE [DISTWIDTH] IN BLOOD BY AUTOMATED COUNT: 11.6 % (ref 11–15)
EST. AVERAGE GLUCOSE BLD GHB EST-MCNC: 206 MG/DL (ref 68–126)
GLUCOSE BLD-MCNC: 226 MG/DL (ref 70–99)
HBA1C MFR BLD HPLC: 8.8 % (ref ?–5.7)
HCT VFR BLD AUTO: 37.4 % (ref 35–48)
HDLC SERPL-MCNC: 54 MG/DL (ref 40–59)
HGB BLD-MCNC: 12.4 G/DL (ref 12–16)
IMM GRANULOCYTES # BLD AUTO: 0.01 X10(3) UL (ref 0–1)
IMM GRANULOCYTES NFR BLD: 0.2 %
LDLC SERPL CALC-MCNC: 116 MG/DL (ref ?–100)
LYMPHOCYTES # BLD AUTO: 1.47 X10(3) UL (ref 1–4)
LYMPHOCYTES NFR BLD AUTO: 23.8 %
MCH RBC QN AUTO: 31.3 PG (ref 26–34)
MCHC RBC AUTO-ENTMCNC: 33.2 G/DL (ref 31–37)
MCV RBC AUTO: 94.4 FL (ref 80–100)
MONOCYTES # BLD AUTO: 0.47 X10(3) UL (ref 0.1–1)
MONOCYTES NFR BLD AUTO: 7.6 %
NEUTROPHILS # BLD AUTO: 3.65 X10 (3) UL (ref 1.5–7.7)
NEUTROPHILS # BLD AUTO: 3.65 X10(3) UL (ref 1.5–7.7)
NEUTROPHILS NFR BLD AUTO: 59.1 %
NONHDLC SERPL-MCNC: 131 MG/DL (ref ?–130)
OSMOLALITY SERPL CALC.SUM OF ELEC: 296 MOSM/KG (ref 275–295)
PATIENT FASTING Y/N/NP: YES
PATIENT FASTING Y/N/NP: YES
PLATELET # BLD AUTO: 148 10(3)UL (ref 150–450)
POTASSIUM SERPL-SCNC: 4.2 MMOL/L (ref 3.5–5.1)
RBC # BLD AUTO: 3.96 X10(6)UL (ref 3.8–5.3)
SODIUM SERPL-SCNC: 138 MMOL/L (ref 136–145)
TRIGL SERPL-MCNC: 75 MG/DL (ref 30–149)
TSI SER-ACNC: 7.02 MIU/ML (ref 0.36–3.74)
VLDLC SERPL CALC-MCNC: 15 MG/DL (ref 0–30)
WBC # BLD AUTO: 6.2 X10(3) UL (ref 4–11)

## 2020-03-03 PROCEDURE — 84460 ALANINE AMINO (ALT) (SGPT): CPT

## 2020-03-03 PROCEDURE — 85025 COMPLETE CBC W/AUTO DIFF WBC: CPT

## 2020-03-03 PROCEDURE — 84443 ASSAY THYROID STIM HORMONE: CPT

## 2020-03-03 PROCEDURE — 84450 TRANSFERASE (AST) (SGOT): CPT

## 2020-03-03 PROCEDURE — 80048 BASIC METABOLIC PNL TOTAL CA: CPT

## 2020-03-03 PROCEDURE — 36415 COLL VENOUS BLD VENIPUNCTURE: CPT

## 2020-03-03 PROCEDURE — 83036 HEMOGLOBIN GLYCOSYLATED A1C: CPT

## 2020-03-03 PROCEDURE — 80061 LIPID PANEL: CPT

## 2020-03-09 ENCOUNTER — OFFICE VISIT (OUTPATIENT)
Dept: INTERNAL MEDICINE CLINIC | Facility: CLINIC | Age: 85
End: 2020-03-09
Payer: MEDICARE

## 2020-03-09 VITALS
TEMPERATURE: 98 F | HEART RATE: 72 BPM | HEIGHT: 62 IN | DIASTOLIC BLOOD PRESSURE: 70 MMHG | SYSTOLIC BLOOD PRESSURE: 130 MMHG | OXYGEN SATURATION: 97 % | BODY MASS INDEX: 27.05 KG/M2 | WEIGHT: 147 LBS

## 2020-03-09 DIAGNOSIS — C50.911 MALIGNANT NEOPLASM OF RIGHT FEMALE BREAST, UNSPECIFIED ESTROGEN RECEPTOR STATUS, UNSPECIFIED SITE OF BREAST (HCC): ICD-10-CM

## 2020-03-09 DIAGNOSIS — D69.6 THROMBOCYTOPENIA (HCC): ICD-10-CM

## 2020-03-09 DIAGNOSIS — Z79.4 TYPE 2 DIABETES MELLITUS WITH DIABETIC POLYNEUROPATHY, WITH LONG-TERM CURRENT USE OF INSULIN (HCC): Primary | ICD-10-CM

## 2020-03-09 DIAGNOSIS — E11.42 TYPE 2 DIABETES MELLITUS WITH DIABETIC POLYNEUROPATHY, WITH LONG-TERM CURRENT USE OF INSULIN (HCC): Primary | ICD-10-CM

## 2020-03-09 DIAGNOSIS — K59.00 CONSTIPATION, UNSPECIFIED CONSTIPATION TYPE: ICD-10-CM

## 2020-03-09 DIAGNOSIS — G62.9 PERIPHERAL POLYNEUROPATHY: ICD-10-CM

## 2020-03-09 DIAGNOSIS — Z95.1 S/P CABG X 3: ICD-10-CM

## 2020-03-09 DIAGNOSIS — I73.9 PERIPHERAL VASCULAR DISEASE (HCC): ICD-10-CM

## 2020-03-09 DIAGNOSIS — R53.83 FATIGUE, UNSPECIFIED TYPE: ICD-10-CM

## 2020-03-09 DIAGNOSIS — I25.10 ASHD (ARTERIOSCLEROTIC HEART DISEASE): ICD-10-CM

## 2020-03-09 DIAGNOSIS — E55.9 VITAMIN D DEFICIENCY: ICD-10-CM

## 2020-03-09 DIAGNOSIS — N18.30 STAGE 3 CHRONIC KIDNEY DISEASE (HCC): ICD-10-CM

## 2020-03-09 DIAGNOSIS — I10 ESSENTIAL HYPERTENSION: ICD-10-CM

## 2020-03-09 DIAGNOSIS — E03.9 HYPOTHYROIDISM, UNSPECIFIED TYPE: ICD-10-CM

## 2020-03-09 DIAGNOSIS — E78.00 HYPERCHOLESTEREMIA: ICD-10-CM

## 2020-03-09 DIAGNOSIS — K21.9 GASTROESOPHAGEAL REFLUX DISEASE WITHOUT ESOPHAGITIS: ICD-10-CM

## 2020-03-09 PROCEDURE — G0463 HOSPITAL OUTPT CLINIC VISIT: HCPCS | Performed by: INTERNAL MEDICINE

## 2020-03-09 PROCEDURE — 99214 OFFICE O/P EST MOD 30 MIN: CPT | Performed by: INTERNAL MEDICINE

## 2020-03-09 RX ORDER — GLIMEPIRIDE 2 MG/1
4 TABLET ORAL
Qty: 180 TABLET | Refills: 3 | Status: SHIPPED | OUTPATIENT
Start: 2020-03-09 | End: 2021-01-13

## 2020-03-09 RX ORDER — LEVOTHYROXINE SODIUM 0.07 MG/1
75 TABLET ORAL
Qty: 90 TABLET | Refills: 3 | Status: SHIPPED | OUTPATIENT
Start: 2020-03-09 | End: 2021-02-25

## 2020-03-09 NOTE — PATIENT INSTRUCTIONS
1.  Patient is to continue her current diet, medications and activity. 2.  Patient is increase her Synthroid/levothyroxine to 0.075 mg orally daily. 3.  Patient is to increase her glimepiride to 4 mg orally daily.   4.  I will plan to see the patient back

## 2020-03-09 NOTE — PROGRESS NOTES
Tejas Solis is a 80year old female. Patient presents with:  Checkup: 3 month  Diabetes  Ashd  Hypertension  Hyperlipidemia    HPI:   Patient presents with:  Checkup: 3 month  Diabetes  Ashd  Hypertension  Hyperlipidemia    Patient is seen today with Vitamin C 500 MG Oral Tab Take 500 mg by mouth daily. • Glucose Blood (CHINMAY CONTOUR NEXT TEST VI) TEST BLOOD SUGAR THREE TIMES DAILY AS DIRECTED  99   • aspirin 81 MG Oral Chew Tab Chew 1 tablet (81 mg total) by mouth daily.  90 tablet 0   • Blood Gluc constipation  :No Urinary complaints  EXT:No complaints of pain or swelling in patient's legs    EXAM:   /70 (BP Location: Left arm, Patient Position: Sitting, Cuff Size: large)   Pulse 72   Temp 97.8 °F (36.6 °C) (Oral)   Ht 5' 2\" (1.575 m)   Wt patient's Synthroid dose to 0.075 mg orally daily. I will see the patient back in 3 months with blood tests which will include a TSH. 8. Peripheral polyneuropathy  Stable. CPM.    9. Gastroesophageal reflux disease without esophagitis  Stable.   CPM.

## 2020-03-26 ENCOUNTER — TELEPHONE (OUTPATIENT)
Dept: CARDIOLOGY | Age: 85
End: 2020-03-26

## 2020-03-30 RX ORDER — PRAVASTATIN SODIUM 40 MG
TABLET ORAL
Qty: 90 TABLET | Refills: 3 | OUTPATIENT
Start: 2020-03-30

## 2020-03-30 RX ORDER — POTASSIUM CHLORIDE 20 MEQ/1
TABLET, EXTENDED RELEASE ORAL
Qty: 90 TABLET | Refills: 3 | Status: SHIPPED | OUTPATIENT
Start: 2020-03-30 | End: 2021-01-13

## 2020-03-30 NOTE — TELEPHONE ENCOUNTER
Current refill request refused due to refill is either a duplicate request or has active refills at the pharmacy. Check previous templates.     Requested Prescriptions     Refused Prescriptions Disp Refills   • PRAVASTATIN SODIUM 40 MG Oral Tab [Pharmacy M Quinolones Pregnancy And Lactation Text: This medication is Pregnancy Category C and it isn't know if it is safe during pregnancy. It is also excreted in breast milk.

## 2020-04-02 ENCOUNTER — TELEPHONE (OUTPATIENT)
Dept: INTERNAL MEDICINE CLINIC | Facility: CLINIC | Age: 85
End: 2020-04-02

## 2020-04-02 NOTE — TELEPHONE ENCOUNTER
Pt daughter Shirly Rinne called  Pt needs NEW RX based on new dosage for Lantus Solostar  RX to say: inject 12 units/2 units for test dose  So RX will be for 14 units (per Dr Pelaez Portal at last appt)  [facility]  Tasked to [employer]

## 2020-04-02 NOTE — TELEPHONE ENCOUNTER
Reviewed and Rx done  Requested Prescriptions     Signed Prescriptions Disp Refills   • insulin glargine (LANTUS SOLOSTAR) 100 UNIT/ML Subcutaneous Solution Pen-injector 15 mL 3     Sig: Inject 12 Units into the skin every morning. (2 units additional for

## 2020-04-02 NOTE — TELEPHONE ENCOUNTER
Pt daughter Arash Baird called, Halie Estrada is not seeing refills available for Pravastatin  Pt is low on medication  Tasked to [employer]

## 2020-04-08 NOTE — TELEPHONE ENCOUNTER
Refill request is for a maintenance medication and has met the criteria specified in the Ambulatory Medication Refill Standing Order for eligibility, visits, laboratory, alerts and was sent to the requested pharmacy.     Requested Prescriptions     Signed P none

## 2020-04-15 ENCOUNTER — TELEPHONE (OUTPATIENT)
Dept: INTERNAL MEDICINE CLINIC | Facility: CLINIC | Age: 85
End: 2020-04-15

## 2020-04-15 NOTE — TELEPHONE ENCOUNTER
Ai Kent is calling her mom has a possible mole that got knocked off and it's itching she has some benadryl pills and wants to know if she can take them along with her current meds please advise ph.  # [phone]   Routed to clinical

## 2020-04-15 NOTE — TELEPHONE ENCOUNTER
Please advise - called daughter Jakob Dacosta per hipaa who states her mother is at [facility] . She hit her left arm on the door and cur herself , started bleeding and nurse bandaged it.  Daughter states that patient thinks there was a mole or dark spot that came

## 2020-04-15 NOTE — TELEPHONE ENCOUNTER
Ryland Tavarez phone call to patient's daughter, Jah Medley and situation discussed. Patient has a skin tear on her arm which is being dressed on a regular basis by the nurses at [facility] where the patient resides. The area is healing well according to the daughter.

## 2020-05-07 ENCOUNTER — TELEPHONE (OUTPATIENT)
Dept: CARDIOLOGY | Age: 85
End: 2020-05-07

## 2020-05-13 NOTE — TELEPHONE ENCOUNTER
Current refill request refused due to refill is either a duplicate request or has active refills at the pharmacy. Check previous templates.     Requested Prescriptions     Refused Prescriptions Disp Refills   • Insulin Pen Needle (BD PEN NEEDLE SHADE U/F) 3

## 2020-05-17 ENCOUNTER — HOSPITAL ENCOUNTER (OUTPATIENT)
Age: 85
Discharge: HOME | End: 2020-05-17
Attending: FAMILY MEDICINE
Payer: MEDICARE

## 2020-05-17 VITALS
DIASTOLIC BLOOD PRESSURE: 86 MMHG | OXYGEN SATURATION: 97 % | SYSTOLIC BLOOD PRESSURE: 172 MMHG | RESPIRATION RATE: 20 BRPM | TEMPERATURE: 98 F | HEART RATE: 66 BPM

## 2020-05-17 DIAGNOSIS — L08.9 LEG ABRASION, INFECTED, LEFT, INITIAL ENCOUNTER: Primary | ICD-10-CM

## 2020-05-17 DIAGNOSIS — S80.812A LEG ABRASION, INFECTED, LEFT, INITIAL ENCOUNTER: Primary | ICD-10-CM

## 2020-05-17 PROCEDURE — 99212 OFFICE O/P EST SF 10 MIN: CPT

## 2020-05-17 PROCEDURE — 99213 OFFICE O/P EST LOW 20 MIN: CPT

## 2020-05-17 NOTE — ED PROVIDER NOTES
Patient Seen in: [facility]      History   Patient presents with:  Laceration Abrasion    Stated Complaint: laceration on left leg    HPI    Pt is a 81 yo with left lower extremity skin abrasion.  She bumped into her bed l reviewed with patient/caregiver and is not pertinent to presenting problem. Social History    Tobacco Use      Smoking status: Never Smoker      Smokeless tobacco: Never Used    Alcohol use: No      Alcohol/week: 0.0 standard drinks    Drug use:  No am    Follow-up:  Nahum Dial MD  [address] [id number] [phone]      As needed or if symptoms worsen          Medications Prescribed:  Current Discharge Medication List

## 2020-05-17 NOTE — ED INITIAL ASSESSMENT (HPI)
Hit left lower leg on metal bed frame during the night. Skin tear noted. Bleeding controlled. Last TDaP 2018. + distal CMS. Denies fall.

## 2020-05-18 RX ORDER — LEVOTHYROXINE SODIUM 0.07 MG/1
75 TABLET ORAL DAILY
COMMUNITY
Start: 2020-03-09

## 2020-05-20 ENCOUNTER — APPOINTMENT (OUTPATIENT)
Dept: CARDIOLOGY | Age: 85
End: 2020-05-20

## 2020-05-20 ENCOUNTER — OFFICE VISIT (OUTPATIENT)
Dept: CARDIOLOGY | Age: 85
End: 2020-05-20

## 2020-05-20 VITALS — HEIGHT: 64 IN | BODY MASS INDEX: 23.56 KG/M2 | WEIGHT: 138 LBS

## 2020-05-20 DIAGNOSIS — E03.9 HYPOTHYROIDISM, UNSPECIFIED TYPE: ICD-10-CM

## 2020-05-20 DIAGNOSIS — I10 BENIGN HYPERTENSION: ICD-10-CM

## 2020-05-20 DIAGNOSIS — Z95.1 HX OF CABG: ICD-10-CM

## 2020-05-20 DIAGNOSIS — E78.00 HYPERCHOLESTEROLEMIA: ICD-10-CM

## 2020-05-20 DIAGNOSIS — I25.10 CORONARY ARTERY DISEASE INVOLVING NATIVE CORONARY ARTERY OF NATIVE HEART WITHOUT ANGINA PECTORIS: Primary | ICD-10-CM

## 2020-05-20 PROCEDURE — 99443 TELEPHONE E&M BY PHYSICIAN EST PT NOT ORIG PREV 7 DAYS 21-30 MIN: CPT | Performed by: INTERNAL MEDICINE

## 2020-05-20 SDOH — HEALTH STABILITY: MENTAL HEALTH: HOW OFTEN DO YOU HAVE A DRINK CONTAINING ALCOHOL?: NEVER

## 2020-05-20 ASSESSMENT — PATIENT HEALTH QUESTIONNAIRE - PHQ9
CLINICAL INTERPRETATION OF PHQ2 SCORE: NO FURTHER SCREENING NEEDED
2. FEELING DOWN, DEPRESSED OR HOPELESS: NOT AT ALL
SUM OF ALL RESPONSES TO PHQ9 QUESTIONS 1 AND 2: 0
1. LITTLE INTEREST OR PLEASURE IN DOING THINGS: NOT AT ALL
CLINICAL INTERPRETATION OF PHQ9 SCORE: NO FURTHER SCREENING NEEDED
SUM OF ALL RESPONSES TO PHQ9 QUESTIONS 1 AND 2: 0

## 2020-07-06 ENCOUNTER — TELEPHONE (OUTPATIENT)
Dept: INTERNAL MEDICINE CLINIC | Facility: CLINIC | Age: 85
End: 2020-07-06

## 2020-07-06 ENCOUNTER — PATIENT MESSAGE (OUTPATIENT)
Dept: INTERNAL MEDICINE CLINIC | Facility: CLINIC | Age: 85
End: 2020-07-06

## 2020-07-06 DIAGNOSIS — B34.9 VIRAL SYNDROME: Primary | ICD-10-CM

## 2020-07-06 NOTE — TELEPHONE ENCOUNTER
Patient normally resides at [facility]. Patient has been staying with her family for the past few months and has done well.   Patient would like to return to [facility] but requires a COVID-19 test.  She is currently staying with her son in [place].  She

## 2020-07-06 NOTE — TELEPHONE ENCOUNTER
Patient's daughter Vannesa Juarez is calling to get a Covid order  In order for the patient to return to [employer] she has to have a test  Patient is having no symptoms     Please call Vannesa Juarez with the info [phone]

## 2020-07-07 ENCOUNTER — PATIENT MESSAGE (OUTPATIENT)
Dept: INTERNAL MEDICINE CLINIC | Facility: CLINIC | Age: 85
End: 2020-07-07

## 2020-07-07 NOTE — TELEPHONE ENCOUNTER
covid test was ordered yesterday. I called the covid hotline to inquire if they tried to reach out to patient. They confirmed that they did and they left her a voice message to schedule.  I provided them with patients son Sariah Aguilar) cell phone number as reques

## 2020-07-07 NOTE — TELEPHONE ENCOUNTER
From: Feli Contreras  To: Henry Morley MD  Sent: 7/6/2020 6:39 PM CDT  Subject: Test Results Question    Hi Dr. Emmanuel Lopez.  I just spoke to you about my mom Metcalfe Lamer test. We will need something in writing to give to [facility] once

## 2020-07-07 NOTE — TELEPHONE ENCOUNTER
From: Lee Oliver  To: Amanda Castro MD  Sent: 7/7/2020 4:01 PM CDT  Subject: Test Results Question    Can you let me know when they will schedule my COVID test? I am concerned they will call my number and I am not there.  My daughter let Dr. Edilma Capellan

## 2020-07-08 ENCOUNTER — LAB ENCOUNTER (OUTPATIENT)
Dept: LAB | Facility: HOSPITAL | Age: 85
End: 2020-07-08
Attending: INTERNAL MEDICINE
Payer: MEDICARE

## 2020-07-08 DIAGNOSIS — B34.9 VIRAL SYNDROME: ICD-10-CM

## 2020-07-10 ENCOUNTER — TELEPHONE (OUTPATIENT)
Dept: INTERNAL MEDICINE CLINIC | Facility: CLINIC | Age: 85
End: 2020-07-10

## 2020-07-10 NOTE — TELEPHONE ENCOUNTER
Note      Viktoriya Sim is calling to see if his mom's COVID19 result will be available on [employer] tomorrow please advise ph. # [phone]  Routed to clinical         To DR. MERCADO

## 2020-07-10 NOTE — TELEPHONE ENCOUNTER
Telephone call to patient's son, Amberly Gomez. I have checked for the results of the patient's COVID-19 nasal swab. At this time it is \"still in process\". I indicated to Abmerly Gomze that I will check to see if I get the result over the weekend.   If I get the resul

## 2020-07-10 NOTE — TELEPHONE ENCOUNTER
Suresh Garay is calling to see if his mom's COVID19 result will be available on [employer] tomorrow please advise ph.  # [phone]  Routed to clinical

## 2020-07-13 ENCOUNTER — TELEPHONE (OUTPATIENT)
Dept: INTERNAL MEDICINE CLINIC | Facility: CLINIC | Age: 85
End: 2020-07-13

## 2020-07-13 ENCOUNTER — PATIENT MESSAGE (OUTPATIENT)
Dept: INTERNAL MEDICINE CLINIC | Facility: CLINIC | Age: 85
End: 2020-07-13

## 2020-07-13 LAB — SARS-COV-2 BY PCR: NOT DETECTED

## 2020-07-13 NOTE — TELEPHONE ENCOUNTER
Per reference lab. covid pcr nasal swab went to [address] [place], [place] [address]demetris Mccarthy for processing. she looked up the status online and states that results should be available tomorrow.      To Dr Brianna Porter

## 2020-07-13 NOTE — TELEPHONE ENCOUNTER
Daughter is calling back to inform us that she called [facility] and was informed that the refills are up. Patient has the prescription sent to [facility] in April 2020 with three refills.  Three refills would have lasted the patient until the end of June, epifanio

## 2020-07-13 NOTE — TELEPHONE ENCOUNTER
Patient's nasal swab for COVID-19 is still not available. I have checked for the lab result multiple times today. Please call [facility] on Tuesday and see we can expect to receive the results from the patient's COVID 19 nasal swab.   We normally receiv

## 2020-07-13 NOTE — TELEPHONE ENCOUNTER
Phone call to patient's son, Jaswinder Goldsmith, and message left. I have not yet received the results of the patient's nasal swab for COVID-19. The results apparently still pending.   I will forward this message to nursing to call the lab to see when the result will

## 2020-07-13 NOTE — TELEPHONE ENCOUNTER
From: Jonathan Dick  To: Polo Blair MD  Sent: 7/13/2020 12:32 PM CDT  Subject: Prescription Question    My pharmacy-[facility]- says they have requested a prescription for needles for my insulin and they are not getting a response back from you.  I

## 2020-07-13 NOTE — TELEPHONE ENCOUNTER
Td Necessary   to Harland Cheadle, MD            7/13/20 8:42 AM   Good morning. We are just checking on my mom-Elvie Chi- LATASHAID-19 test results. She had the test last week Wednesday at [address] [place] [address] and we still had not heard anything.  They said it w

## 2020-07-13 NOTE — TELEPHONE ENCOUNTER
From: Millie Frazier  To: Sonu Magallanes MD  Sent: 7/13/2020 8:42 AM CDT  Subject: Test Results Question    Good morning. We are just checking on my mom-Elvie Chi- LATASHAID-19 test results.  She had the test last week Wednesday at [address] [place] [address] and we still

## 2020-07-14 ENCOUNTER — PATIENT MESSAGE (OUTPATIENT)
Dept: INTERNAL MEDICINE CLINIC | Facility: CLINIC | Age: 85
End: 2020-07-14

## 2020-07-14 NOTE — TELEPHONE ENCOUNTER
Patient daughter Kristine Herrmann asking for Covid results to be dropped into patient mychart. Needs results to [facility]    Dr. Eunice Upton has reviewed results and spoke to patient's son.

## 2020-07-14 NOTE — TELEPHONE ENCOUNTER
To Dr. MERCADO-- nasal swab now resulted. Some tests being processed as \"ARUP\" which can take up to 5-7 days.

## 2020-07-14 NOTE — TELEPHONE ENCOUNTER
Daughter Sherif Mauro called    Requests results are faxed to [facility]  Fax # [phone]  Fax sent/confirmation was received

## 2020-07-14 NOTE — TELEPHONE ENCOUNTER
Letitia Lozano calls back please notify him results are at [facility] for . Thanks! COVID test results placed in labeled envelope at [facility] for .

## 2020-07-14 NOTE — TELEPHONE ENCOUNTER
Noted.  I did call patient's son David Dumont and notify him that we are still waiting for the result. We are hoping the result will be available tomorrow. David Dumont is let me know that they need a written copy of the result to get his mother back into [facility].   I edmond

## 2020-07-14 NOTE — TELEPHONE ENCOUNTER
Pt's nasal swab for COVID 19 has come back negative. I have called pt's son, Santana Abdul informed him of the result. He needs a paper copy of the result for his mother, Polly Pereyra, to get back into her apartment at [facility].   Please pr

## 2020-07-15 ENCOUNTER — PATIENT MESSAGE (OUTPATIENT)
Dept: INTERNAL MEDICINE CLINIC | Facility: CLINIC | Age: 85
End: 2020-07-15

## 2020-07-15 ENCOUNTER — TELEPHONE (OUTPATIENT)
Dept: INTERNAL MEDICINE CLINIC | Facility: CLINIC | Age: 85
End: 2020-07-15

## 2020-07-15 NOTE — TELEPHONE ENCOUNTER
Duplicate encounter. Per encounter 7/10, results faxed to [facility] and released on [employer]. Results are also at [facility] awaiting PU.

## 2020-07-15 NOTE — TELEPHONE ENCOUNTER
From: Kika Vaughn  To: Rakel Mercer MD  Sent: 7/15/2020 7:33 AM CDT  Subject: Non-Urgent Medical Question    Just wanted to thank you all for your help. And thank you to Dr. Vinh Crowell for contacting them about her results too.  Your help is debbie

## 2020-07-15 NOTE — TELEPHONE ENCOUNTER
From: Wanetta November  To: Cam Serrano MD  Sent: 7/14/2020 3:51 PM CDT  Subject: Test Results Question    Can you please send me a message that [address] test was negative. Thank you.

## 2020-07-15 NOTE — TELEPHONE ENCOUNTER
Lola West   to Jennifer Bonner MD            7/15/20 7:33 AM   Just wanted to thank you all for your help. And thank you to Dr. Pretty Glover for contacting them about her results too. Your help is greatly appreciated!  My mom is back at [facility]

## 2020-08-12 ENCOUNTER — PATIENT MESSAGE (OUTPATIENT)
Dept: INTERNAL MEDICINE CLINIC | Facility: CLINIC | Age: 85
End: 2020-08-12

## 2020-08-12 ENCOUNTER — TELEPHONE (OUTPATIENT)
Dept: INTERNAL MEDICINE CLINIC | Facility: CLINIC | Age: 85
End: 2020-08-12

## 2020-08-12 NOTE — TELEPHONE ENCOUNTER
From: Zonia Boyer  To: Valente Guallpa MD  Sent: 8/12/2020 3:52 PM CDT  Subject: Visit Follow-up Question    My mom Sal Hyde has an appointment Monday morning with Dr Eriberto Alonso. Does she need to have blood work done before her appointment?

## 2020-08-12 NOTE — TELEPHONE ENCOUNTER
I spoke with patient's daughter, Sherif Mauro, ok per DAVID. I explained that fasting blood work is ordered and she can call to schedule this. I provided her with the number for central scheduling. She verbalized understanding.

## 2020-08-12 NOTE — TELEPHONE ENCOUNTER
----- Message from Kirti Solorzano. Paulina Farmer sent at 8/12/2020  3:52 PM CDT -----  Regarding: Visit Follow-up Question  Contact: [phone]  My mom Vania Dow has an appointment Monday morning with Dr Art Spence.  Does she need to have blood work done before

## 2020-08-13 ENCOUNTER — LAB ENCOUNTER (OUTPATIENT)
Dept: LAB | Age: 85
End: 2020-08-13
Attending: INTERNAL MEDICINE
Payer: MEDICARE

## 2020-08-13 DIAGNOSIS — D69.6 THROMBOCYTOPENIA (HCC): ICD-10-CM

## 2020-08-13 DIAGNOSIS — Z79.4 TYPE 2 DIABETES MELLITUS WITH DIABETIC POLYNEUROPATHY, WITH LONG-TERM CURRENT USE OF INSULIN (HCC): ICD-10-CM

## 2020-08-13 DIAGNOSIS — E11.42 TYPE 2 DIABETES MELLITUS WITH DIABETIC POLYNEUROPATHY, WITH LONG-TERM CURRENT USE OF INSULIN (HCC): ICD-10-CM

## 2020-08-13 DIAGNOSIS — E03.9 HYPOTHYROIDISM, UNSPECIFIED TYPE: ICD-10-CM

## 2020-08-13 DIAGNOSIS — E78.00 HYPERCHOLESTEREMIA: ICD-10-CM

## 2020-08-13 DIAGNOSIS — N18.30 STAGE 3 CHRONIC KIDNEY DISEASE (HCC): ICD-10-CM

## 2020-08-13 DIAGNOSIS — R53.83 FATIGUE, UNSPECIFIED TYPE: ICD-10-CM

## 2020-08-13 LAB
ABSOLUTE IMMATURE GRANULOCYTES (OFFPRE24): 0.01
ALT SERPL-CCNC: 24 U/L (ref 13–56)
ANION GAP SERPL CALC-SCNC: 7 MMOL/L
ANION GAP SERPL CALC-SCNC: 7 MMOL/L (ref 0–18)
AST SERPL-CCNC: 21 U/L (ref 15–37)
BASOPHILS # BLD AUTO: 0.08 X10(3) UL (ref 0–0.2)
BASOPHILS # BLD: 0.08 10*3/UL
BASOPHILS NFR BLD AUTO: 1.3 %
BUN BLD-MCNC: 26 MG/DL (ref 7–18)
BUN SERPL-MCNC: 26 MG/DL
BUN/CREAT SERPL: 23.4
BUN/CREAT SERPL: 23.4 (ref 10–20)
CALCIUM BLD-MCNC: 9.7 MG/DL (ref 8.5–10.1)
CALCIUM SERPL-MCNC: 9.7 MG/DL
CHLORIDE SERPL-SCNC: 105 MMOL/L
CHLORIDE SERPL-SCNC: 105 MMOL/L (ref 98–112)
CHOLEST SERPL-MCNC: 164 MG/DL
CHOLEST SMN-MCNC: 164 MG/DL (ref ?–200)
CO2 SERPL-SCNC: 27 MMOL/L
CO2 SERPL-SCNC: 27 MMOL/L (ref 21–32)
CREAT BLD-MCNC: 1.11 MG/DL (ref 0.55–1.02)
CREAT SERPL-MCNC: 1.11 MG/DL
DEPRECATED RDW RBC AUTO: 41.5 FL (ref 35.1–46.3)
EOSINOPHIL # BLD AUTO: 0.51 X10(3) UL (ref 0–0.7)
EOSINOPHIL # BLD: 0.51 10*3/UL
EOSINOPHIL NFR BLD AUTO: 8.5 %
EOSINOPHIL NFR BLD: 8.5 %
ERYTHROCYTE [DISTWIDTH] IN BLOOD BY AUTOMATED COUNT: 12 % (ref 11–15)
EST. AVERAGE GLUCOSE BLD GHB EST-MCNC: 197 MG/DL (ref 68–126)
GLUCOSE BLD-MCNC: 191 MG/DL (ref 70–99)
GLUCOSE SERPL-MCNC: 191 MG/DL
HBA1C MFR BLD HPLC: 8.5 % (ref ?–5.7)
HCT VFR BLD AUTO: 35.7 % (ref 35–48)
HCT VFR BLD CALC: 35.7 %
HDLC SERPL-MCNC: 55 MG/DL
HDLC SERPL-MCNC: 55 MG/DL (ref 40–59)
HGB BLD-MCNC: 11.9 G/DL
HGB BLD-MCNC: 11.9 G/DL (ref 12–16)
IMM GRANULOCYTES # BLD AUTO: 0.01 X10(3) UL (ref 0–1)
IMM GRANULOCYTES NFR BLD: 0.2 %
IMMATURE GRANULOCYTES (OFFPRE25): 0.01
LDLC SERPL CALC-MCNC: 96 MG/DL
LDLC SERPL CALC-MCNC: 96 MG/DL (ref ?–100)
LENGTH OF FAST TIME PATIENT: YES H
LENGTH OF FAST TIME PATIENT: YES H
LYMPHOCYTES # BLD AUTO: 1.15 X10(3) UL (ref 1–4)
LYMPHOCYTES # BLD: 1.15 10*3/UL
LYMPHOCYTES NFR BLD AUTO: 19.2 %
LYMPHOCYTES NFR BLD: 19.2 %
MCH RBC QN AUTO: 31.4 PG
MCH RBC QN AUTO: 31.4 PG (ref 26–34)
MCHC RBC AUTO-ENTMCNC: 33.3 G/DL
MCHC RBC AUTO-ENTMCNC: 33.3 G/DL (ref 31–37)
MCV RBC AUTO: 94.2 FL
MCV RBC AUTO: 94.2 FL (ref 80–100)
MONOCYTES # BLD AUTO: 0.4 X10(3) UL (ref 0.1–1)
MONOCYTES # BLD: 0.4 10*3/UL
MONOCYTES NFR BLD AUTO: 6.7 %
MONOCYTES NFR BLD: 6.7 %
NEUTROPHILS # BLD AUTO: 3.83 X10 (3) UL (ref 1.5–7.7)
NEUTROPHILS # BLD AUTO: 3.83 X10(3) UL (ref 1.5–7.7)
NEUTROPHILS # BLD: 3.83 10*3/UL
NEUTROPHILS NFR BLD AUTO: 64.1 %
NEUTROPHILS NFR BLD: 64.1 %
NONHDLC SERPL-MCNC: 109 MG/DL
NONHDLC SERPL-MCNC: 109 MG/DL (ref ?–130)
OSMOLALITY SERPL CALC.SUM OF ELEC: 298 MOSM/KG (ref 275–295)
PATIENT FASTING Y/N/NP: YES
PATIENT FASTING Y/N/NP: YES
PLATELET # BLD AUTO: 151 10(3)UL (ref 150–450)
PLATELET # BLD: 151 K/MCL
POTASSIUM SERPL-SCNC: 4.2 MMOL/L
POTASSIUM SERPL-SCNC: 4.2 MMOL/L (ref 3.5–5.1)
RBC # BLD AUTO: 3.79 X10(6)UL (ref 3.8–5.3)
RBC # BLD: 3.79 10*6/UL
SODIUM SERPL-SCNC: 139 MMOL/L
SODIUM SERPL-SCNC: 139 MMOL/L (ref 136–145)
TRIGL SERPL-MCNC: 63 MG/DL
TRIGL SERPL-MCNC: 63 MG/DL (ref 30–149)
TSI SER-ACNC: 4.44 MIU/ML (ref 0.36–3.74)
VLDLC SERPL CALC-MCNC: 13 MG/DL
VLDLC SERPL CALC-MCNC: 13 MG/DL (ref 0–30)
WBC # BLD AUTO: 6 X10(3) UL (ref 4–11)
WBC # BLD: 6 K/MCL

## 2020-08-13 PROCEDURE — 84450 TRANSFERASE (AST) (SGOT): CPT

## 2020-08-13 PROCEDURE — 80048 BASIC METABOLIC PNL TOTAL CA: CPT

## 2020-08-13 PROCEDURE — 84460 ALANINE AMINO (ALT) (SGPT): CPT

## 2020-08-13 PROCEDURE — 84443 ASSAY THYROID STIM HORMONE: CPT

## 2020-08-13 PROCEDURE — 83036 HEMOGLOBIN GLYCOSYLATED A1C: CPT

## 2020-08-13 PROCEDURE — 85025 COMPLETE CBC W/AUTO DIFF WBC: CPT

## 2020-08-13 PROCEDURE — 80061 LIPID PANEL: CPT

## 2020-08-13 PROCEDURE — 36415 COLL VENOUS BLD VENIPUNCTURE: CPT

## 2020-08-17 ENCOUNTER — OFFICE VISIT (OUTPATIENT)
Dept: INTERNAL MEDICINE CLINIC | Facility: CLINIC | Age: 85
End: 2020-08-17
Payer: MEDICARE

## 2020-08-17 VITALS
DIASTOLIC BLOOD PRESSURE: 70 MMHG | TEMPERATURE: 98 F | HEIGHT: 62 IN | SYSTOLIC BLOOD PRESSURE: 160 MMHG | RESPIRATION RATE: 16 BRPM | HEART RATE: 60 BPM | BODY MASS INDEX: 26.87 KG/M2 | OXYGEN SATURATION: 98 % | WEIGHT: 146 LBS

## 2020-08-17 DIAGNOSIS — I10 ESSENTIAL HYPERTENSION: ICD-10-CM

## 2020-08-17 DIAGNOSIS — N18.30 TYPE 2 DIABETES MELLITUS WITH STAGE 3 CHRONIC KIDNEY DISEASE, WITH LONG-TERM CURRENT USE OF INSULIN (HCC): ICD-10-CM

## 2020-08-17 DIAGNOSIS — Z79.4 TYPE 2 DIABETES MELLITUS WITH STAGE 3 CHRONIC KIDNEY DISEASE, WITH LONG-TERM CURRENT USE OF INSULIN (HCC): ICD-10-CM

## 2020-08-17 DIAGNOSIS — R53.83 FATIGUE, UNSPECIFIED TYPE: ICD-10-CM

## 2020-08-17 DIAGNOSIS — I73.9 PERIPHERAL VASCULAR DISEASE (HCC): ICD-10-CM

## 2020-08-17 DIAGNOSIS — N18.30 STAGE 3 CHRONIC KIDNEY DISEASE (HCC): ICD-10-CM

## 2020-08-17 DIAGNOSIS — C50.911 MALIGNANT NEOPLASM OF RIGHT FEMALE BREAST, UNSPECIFIED ESTROGEN RECEPTOR STATUS, UNSPECIFIED SITE OF BREAST (HCC): ICD-10-CM

## 2020-08-17 DIAGNOSIS — E55.9 VITAMIN D DEFICIENCY: ICD-10-CM

## 2020-08-17 DIAGNOSIS — E78.00 HYPERCHOLESTEREMIA: ICD-10-CM

## 2020-08-17 DIAGNOSIS — Z95.1 S/P CABG X 3: ICD-10-CM

## 2020-08-17 DIAGNOSIS — E03.9 HYPOTHYROIDISM, UNSPECIFIED TYPE: ICD-10-CM

## 2020-08-17 DIAGNOSIS — E11.22 TYPE 2 DIABETES MELLITUS WITH STAGE 3 CHRONIC KIDNEY DISEASE, WITH LONG-TERM CURRENT USE OF INSULIN (HCC): ICD-10-CM

## 2020-08-17 DIAGNOSIS — E11.42 TYPE 2 DIABETES MELLITUS WITH DIABETIC POLYNEUROPATHY, WITH LONG-TERM CURRENT USE OF INSULIN (HCC): Primary | ICD-10-CM

## 2020-08-17 DIAGNOSIS — Z79.4 TYPE 2 DIABETES MELLITUS WITH DIABETIC POLYNEUROPATHY, WITH LONG-TERM CURRENT USE OF INSULIN (HCC): Primary | ICD-10-CM

## 2020-08-17 DIAGNOSIS — K21.9 GASTROESOPHAGEAL REFLUX DISEASE WITHOUT ESOPHAGITIS: ICD-10-CM

## 2020-08-17 DIAGNOSIS — I25.10 ASHD (ARTERIOSCLEROTIC HEART DISEASE): ICD-10-CM

## 2020-08-17 DIAGNOSIS — D69.6 THROMBOCYTOPENIA (HCC): ICD-10-CM

## 2020-08-17 DIAGNOSIS — G62.9 PERIPHERAL POLYNEUROPATHY: ICD-10-CM

## 2020-08-17 DIAGNOSIS — K59.00 CONSTIPATION, UNSPECIFIED CONSTIPATION TYPE: ICD-10-CM

## 2020-08-17 PROCEDURE — G0463 HOSPITAL OUTPT CLINIC VISIT: HCPCS | Performed by: INTERNAL MEDICINE

## 2020-08-17 PROCEDURE — 99214 OFFICE O/P EST MOD 30 MIN: CPT | Performed by: INTERNAL MEDICINE

## 2020-08-17 RX ORDER — METOPROLOL SUCCINATE 25 MG/1
25 TABLET, EXTENDED RELEASE ORAL DAILY
Qty: 90 TABLET | Refills: 3 | Status: SHIPPED | OUTPATIENT
Start: 2020-08-17 | End: 2021-01-13

## 2020-08-17 NOTE — PROGRESS NOTES
Roberta Salazar is a 80year old female. Patient presents with:  Checkup: 5 month, Patient c/o rash to both arms off/on with current redness. Denies pain with occasional itching.    Diabetes  Ashd  Hypertension  Hyperlipidemia    HPI:   Patient presents wi • Vitamin C 500 MG Oral Tab Take 500 mg by mouth daily. • Glucose Blood (CHINMAY CONTOUR NEXT TEST VI) TEST BLOOD SUGAR THREE TIMES DAILY AS DIRECTED  99   • aspirin 81 MG Oral Chew Tab Chew 1 tablet (81 mg total) by mouth daily.  90 tablet 0   • Blood constipation  :No Urinary complaints  EXT:No complaints of pain or swelling in patient's legs    EXAM:   /70 (BP Location: Left arm, Patient Position: Sitting, Cuff Size: adult)   Pulse 60   Temp 97.7 °F (36.5 °C) (Temporal)   Resp 16   Ht 5' 2\" ( without causing her to become too bradycardic. 5. Hypercholesteremia  Doing well.  CPM.  Patient's recent lipid panel had a cholesterol 1 and 64, triglycerides are 63, HDL cholesterol was 55 and LDL cholesterol was 96.   Patient's AST was 21 and ALT was

## 2020-08-17 NOTE — PATIENT INSTRUCTIONS
1.  Patient is to continue her current diet, medication and activity. 2.  I will see the patient back in 2 months with blood tests as ordered. 3.  I have added metoprolol succinate 25 mg orally daily to the patient's medications.   4.  Patient is to get t

## 2020-09-02 NOTE — TELEPHONE ENCOUNTER
Pt. Is calling to request a refill a for Vitamin B6, Vitamin D,Vitamin C and test strips ph. # [phone]  Pt. Send Rx to [facility] ph.  # [phone]  Routed to Rx

## 2020-09-03 RX ORDER — OYSTER SHELL CALCIUM WITH VITAMIN D 500; 200 MG/1; [IU]/1
1 TABLET, FILM COATED ORAL DAILY
Qty: 90 TABLET | Refills: 3 | Status: SHIPPED | OUTPATIENT
Start: 2020-09-03 | End: 2021-09-14

## 2020-09-03 RX ORDER — ASCORBIC ACID 500 MG
500 TABLET ORAL DAILY
Qty: 90 TABLET | Refills: 3 | Status: SHIPPED | OUTPATIENT
Start: 2020-09-03 | End: 2021-09-03

## 2020-09-17 ENCOUNTER — TELEPHONE (OUTPATIENT)
Dept: INTERNAL MEDICINE CLINIC | Facility: CLINIC | Age: 85
End: 2020-09-17

## 2020-09-18 RX ORDER — LISINOPRIL AND HYDROCHLOROTHIAZIDE 25; 20 MG/1; MG/1
TABLET ORAL
Qty: 180 TABLET | Refills: 1 | Status: SHIPPED | OUTPATIENT
Start: 2020-09-18

## 2020-09-18 NOTE — TELEPHONE ENCOUNTER
Refill request received for lisinopril-HCTZ. It does not look like this has ever been prescribed through this office before and was last entered as historical in 2017. Not mentioned in Dr. Mark Avalos last office visit note.      Dr. Keisha Moore - can you ple

## 2020-09-20 RX ORDER — LISINOPRIL AND HYDROCHLOROTHIAZIDE 25; 20 MG/1; MG/1
1 TABLET ORAL 2 TIMES DAILY
Qty: 180 TABLET | Refills: 3 | Status: SHIPPED | OUTPATIENT
Start: 2020-09-20 | End: 2021-09-14

## 2020-09-21 NOTE — TELEPHONE ENCOUNTER
I have reviewed pt's chart. Pt has been taking Lisinopril/HCTZ 20/25--1 tablet orally BID per Dr Mery Melgar note. I have refilled pt's med.

## 2020-10-22 ENCOUNTER — OFFICE VISIT (OUTPATIENT)
Dept: INTERNAL MEDICINE CLINIC | Facility: CLINIC | Age: 85
End: 2020-10-22
Payer: MEDICARE

## 2020-10-22 VITALS
HEART RATE: 60 BPM | OXYGEN SATURATION: 96 % | TEMPERATURE: 98 F | SYSTOLIC BLOOD PRESSURE: 144 MMHG | WEIGHT: 142.81 LBS | RESPIRATION RATE: 16 BRPM | BODY MASS INDEX: 26.28 KG/M2 | HEIGHT: 62 IN | DIASTOLIC BLOOD PRESSURE: 70 MMHG

## 2020-10-22 DIAGNOSIS — C50.911 MALIGNANT NEOPLASM OF RIGHT FEMALE BREAST, UNSPECIFIED ESTROGEN RECEPTOR STATUS, UNSPECIFIED SITE OF BREAST (HCC): ICD-10-CM

## 2020-10-22 DIAGNOSIS — K59.00 CONSTIPATION, UNSPECIFIED CONSTIPATION TYPE: ICD-10-CM

## 2020-10-22 DIAGNOSIS — Z95.1 S/P CABG X 3: ICD-10-CM

## 2020-10-22 DIAGNOSIS — K21.9 GASTROESOPHAGEAL REFLUX DISEASE WITHOUT ESOPHAGITIS: ICD-10-CM

## 2020-10-22 DIAGNOSIS — N18.32 TYPE 2 DIABETES MELLITUS WITH STAGE 3B CHRONIC KIDNEY DISEASE, WITH LONG-TERM CURRENT USE OF INSULIN (HCC): ICD-10-CM

## 2020-10-22 DIAGNOSIS — I25.10 ASHD (ARTERIOSCLEROTIC HEART DISEASE): ICD-10-CM

## 2020-10-22 DIAGNOSIS — E11.22 TYPE 2 DIABETES MELLITUS WITH STAGE 3B CHRONIC KIDNEY DISEASE, WITH LONG-TERM CURRENT USE OF INSULIN (HCC): ICD-10-CM

## 2020-10-22 DIAGNOSIS — I10 ESSENTIAL HYPERTENSION: ICD-10-CM

## 2020-10-22 DIAGNOSIS — E03.9 HYPOTHYROIDISM, UNSPECIFIED TYPE: ICD-10-CM

## 2020-10-22 DIAGNOSIS — I73.9 PERIPHERAL VASCULAR DISEASE (HCC): ICD-10-CM

## 2020-10-22 DIAGNOSIS — Z79.4 TYPE 2 DIABETES MELLITUS WITH STAGE 3B CHRONIC KIDNEY DISEASE, WITH LONG-TERM CURRENT USE OF INSULIN (HCC): ICD-10-CM

## 2020-10-22 DIAGNOSIS — E11.42 TYPE 2 DIABETES MELLITUS WITH DIABETIC POLYNEUROPATHY, WITH LONG-TERM CURRENT USE OF INSULIN (HCC): Primary | ICD-10-CM

## 2020-10-22 DIAGNOSIS — G62.9 PERIPHERAL POLYNEUROPATHY: ICD-10-CM

## 2020-10-22 DIAGNOSIS — E78.00 HYPERCHOLESTEREMIA: ICD-10-CM

## 2020-10-22 DIAGNOSIS — Z79.4 TYPE 2 DIABETES MELLITUS WITH DIABETIC POLYNEUROPATHY, WITH LONG-TERM CURRENT USE OF INSULIN (HCC): Primary | ICD-10-CM

## 2020-10-22 DIAGNOSIS — N18.32 STAGE 3B CHRONIC KIDNEY DISEASE (HCC): ICD-10-CM

## 2020-10-22 DIAGNOSIS — D69.6 THROMBOCYTOPENIA (HCC): ICD-10-CM

## 2020-10-22 DIAGNOSIS — R53.83 FATIGUE, UNSPECIFIED TYPE: ICD-10-CM

## 2020-10-22 DIAGNOSIS — E55.9 VITAMIN D DEFICIENCY: ICD-10-CM

## 2020-10-22 PROCEDURE — G0463 HOSPITAL OUTPT CLINIC VISIT: HCPCS | Performed by: INTERNAL MEDICINE

## 2020-10-22 PROCEDURE — 99214 OFFICE O/P EST MOD 30 MIN: CPT | Performed by: INTERNAL MEDICINE

## 2020-10-22 NOTE — PROGRESS NOTES
Vito Kruse is a 80year old female. Patient presents with:  Checkup: 2 month, Pt states she has not been consistent with taking Metoprolol. Pt son states patient has some recent lapses of memory.    Diabetes  Ashd  Hypertension  Hyperlipidemia    HPI: (OSCAL 500/200 D-3) 500-200 MG-UNIT Oral Tab Take 1 tablet by mouth daily. 90 tablet 3   • Glucose Blood (CONTOUR NEXT TEST) In Vitro Strip 1 each by Other route 4 (four) times daily.  Test 3-4 times daily 400 strip 3   • Metoprolol Succinate ER 25 MG Oral Medical History:   Diagnosis Date   • Abnormal EKG 2005   • [facility]    infiltrating Drictal ca.  radiation and tamoxifen   • Colon polyps 2003   • Esophageal reflux    • Esophagitis    • Heart attack ([facility] 2016   • High blood pressure    • patient does repeat some questions. ASSESSMENT AND PLAN:   1. Type 2 diabetes mellitus with diabetic polyneuropathy, with long-term current use of insulin (HCC)  Patient's diabetes mellitus appears stable at this time.   Patient's blood pressure has been b sees me in 3 months also. 7. Peripheral vascular disease (HCC)  Stable. CPM.    8. Hypothyroidism, unspecified type  Stable. CPM.    9. Peripheral polyneuropathy  Stable. CPM.    10. Gastroesophageal reflux disease without esophagitis  Stable.   CPM.

## 2020-10-22 NOTE — PATIENT INSTRUCTIONS
1.  Patient is to continue her current diet, medication and activity. 2.  Patient is to continue to take her metoprolol XL 25 mg orally every morning.   3.  I will see the patient back in 3 months with previously scheduled blood test.  The patient's previo

## 2020-11-06 RX ORDER — METOPROLOL SUCCINATE 25 MG/1
25 TABLET, EXTENDED RELEASE ORAL DAILY
COMMUNITY
Start: 2020-08-17 | End: 2020-11-09 | Stop reason: ALTCHOICE

## 2020-11-06 RX ORDER — DILTIAZEM HYDROCHLORIDE 240 MG/1
240 CAPSULE, COATED, EXTENDED RELEASE ORAL DAILY
COMMUNITY
End: 2020-11-09

## 2020-11-06 RX ORDER — POTASSIUM CHLORIDE 20 MEQ/1
20 TABLET, EXTENDED RELEASE ORAL DAILY
COMMUNITY

## 2020-11-09 ENCOUNTER — LAB ENCOUNTER (OUTPATIENT)
Dept: LAB | Age: 85
End: 2020-11-09
Attending: INTERNAL MEDICINE
Payer: MEDICARE

## 2020-11-09 ENCOUNTER — OFFICE VISIT (OUTPATIENT)
Dept: CARDIOLOGY | Age: 85
End: 2020-11-09

## 2020-11-09 VITALS
DIASTOLIC BLOOD PRESSURE: 66 MMHG | WEIGHT: 141 LBS | OXYGEN SATURATION: 95 % | BODY MASS INDEX: 24.98 KG/M2 | SYSTOLIC BLOOD PRESSURE: 152 MMHG | HEIGHT: 63 IN | HEART RATE: 57 BPM

## 2020-11-09 DIAGNOSIS — I10 BENIGN HYPERTENSION: ICD-10-CM

## 2020-11-09 DIAGNOSIS — I25.10 CORONARY ARTERY DISEASE INVOLVING NATIVE CORONARY ARTERY OF NATIVE HEART WITHOUT ANGINA PECTORIS: ICD-10-CM

## 2020-11-09 DIAGNOSIS — I10 HTN (HYPERTENSION): ICD-10-CM

## 2020-11-09 DIAGNOSIS — E11.59 CONTROLLED TYPE 2 DIABETES MELLITUS WITH OTHER CIRCULATORY COMPLICATION, WITHOUT LONG-TERM CURRENT USE OF INSULIN (CMD): Primary | ICD-10-CM

## 2020-11-09 DIAGNOSIS — Z95.1 S/P CABG X 1: ICD-10-CM

## 2020-11-09 DIAGNOSIS — I25.10 CORONARY ARTERY DISEASE: ICD-10-CM

## 2020-11-09 DIAGNOSIS — Z95.1 HX OF CABG: ICD-10-CM

## 2020-11-09 DIAGNOSIS — E11.59 TYPE 2 DIABETES MELLITUS WITH VASCULAR DISEASE (HCC): Primary | ICD-10-CM

## 2020-11-09 PROCEDURE — 81015 MICROSCOPIC EXAM OF URINE: CPT

## 2020-11-09 PROCEDURE — 81001 URINALYSIS AUTO W/SCOPE: CPT

## 2020-11-09 PROCEDURE — 99214 OFFICE O/P EST MOD 30 MIN: CPT | Performed by: INTERNAL MEDICINE

## 2020-11-09 RX ORDER — AMLODIPINE BESYLATE 5 MG/1
5 TABLET ORAL DAILY
Qty: 30 TABLET | Refills: 5 | Status: SHIPPED | OUTPATIENT
Start: 2020-11-09 | End: 2021-05-07

## 2020-11-09 ASSESSMENT — PATIENT HEALTH QUESTIONNAIRE - PHQ9
CLINICAL INTERPRETATION OF PHQ2 SCORE: NO FURTHER SCREENING NEEDED
SUM OF ALL RESPONSES TO PHQ9 QUESTIONS 1 AND 2: 2
1. LITTLE INTEREST OR PLEASURE IN DOING THINGS: SEVERAL DAYS
2. FEELING DOWN, DEPRESSED OR HOPELESS: SEVERAL DAYS
CLINICAL INTERPRETATION OF PHQ9 SCORE: NO FURTHER SCREENING NEEDED
SUM OF ALL RESPONSES TO PHQ9 QUESTIONS 1 AND 2: 2

## 2020-11-11 ENCOUNTER — TELEPHONE (OUTPATIENT)
Dept: CARDIOLOGY | Age: 85
End: 2020-11-11

## 2020-11-13 ENCOUNTER — TELEPHONE (OUTPATIENT)
Dept: INTERNAL MEDICINE CLINIC | Facility: CLINIC | Age: 85
End: 2020-11-13

## 2020-11-13 ENCOUNTER — PATIENT MESSAGE (OUTPATIENT)
Dept: INTERNAL MEDICINE CLINIC | Facility: CLINIC | Age: 85
End: 2020-11-13

## 2020-11-13 NOTE — TELEPHONE ENCOUNTER
From: Ghassan Nazario  To: Maggie Hampton MD  Sent: 11/13/2020 1:50 PM CST  Subject: Non-Urgent Medical Question    My mom Laura Morales is nauseous every day about an hour or so after she takes her lunch medication.  Could the potassium she’s takin

## 2020-11-17 RX ORDER — DILTIAZEM HYDROCHLORIDE 240 MG/1
240 CAPSULE, COATED, EXTENDED RELEASE ORAL DAILY
Qty: 90 CAPSULE | Refills: 3 | Status: SHIPPED | OUTPATIENT
Start: 2020-11-17 | End: 2021-11-29

## 2021-01-08 ENCOUNTER — TELEPHONE (OUTPATIENT)
Dept: INTERNAL MEDICINE CLINIC | Facility: CLINIC | Age: 86
End: 2021-01-08

## 2021-01-08 ENCOUNTER — LAB ENCOUNTER (OUTPATIENT)
Dept: LAB | Age: 86
End: 2021-01-08
Attending: INTERNAL MEDICINE
Payer: MEDICARE

## 2021-01-08 DIAGNOSIS — E11.42 TYPE 2 DIABETES MELLITUS WITH DIABETIC POLYNEUROPATHY, WITH LONG-TERM CURRENT USE OF INSULIN (HCC): ICD-10-CM

## 2021-01-08 DIAGNOSIS — Z79.4 TYPE 2 DIABETES MELLITUS WITH DIABETIC POLYNEUROPATHY, WITH LONG-TERM CURRENT USE OF INSULIN (HCC): ICD-10-CM

## 2021-01-08 DIAGNOSIS — N18.30 STAGE 3 CHRONIC KIDNEY DISEASE (HCC): ICD-10-CM

## 2021-01-08 DIAGNOSIS — Z79.4 TYPE 2 DIABETES MELLITUS WITHOUT COMPLICATION, WITH LONG-TERM CURRENT USE OF INSULIN (HCC): Primary | ICD-10-CM

## 2021-01-08 DIAGNOSIS — E11.9 TYPE 2 DIABETES MELLITUS WITHOUT COMPLICATION, WITH LONG-TERM CURRENT USE OF INSULIN (HCC): Primary | ICD-10-CM

## 2021-01-08 DIAGNOSIS — R53.83 FATIGUE, UNSPECIFIED TYPE: ICD-10-CM

## 2021-01-08 DIAGNOSIS — E78.00 HYPERCHOLESTEREMIA: ICD-10-CM

## 2021-01-08 LAB
ALT SERPL-CCNC: 21 U/L
ANION GAP SERPL CALC-SCNC: 4 MMOL/L (ref 0–18)
AST SERPL-CCNC: 17 U/L (ref 15–37)
BASOPHILS # BLD AUTO: 0.1 X10(3) UL (ref 0–0.2)
BASOPHILS NFR BLD AUTO: 1.5 %
BUN BLD-MCNC: 44 MG/DL (ref 7–18)
BUN/CREAT SERPL: 27.8 (ref 10–20)
CALCIUM BLD-MCNC: 10.4 MG/DL (ref 8.5–10.1)
CHLORIDE SERPL-SCNC: 100 MMOL/L (ref 98–112)
CHOLEST SERPL-MCNC: 184 MG/DL
CHOLEST SMN-MCNC: 184 MG/DL (ref ?–200)
CO2 SERPL-SCNC: 32 MMOL/L (ref 21–32)
CREAT BLD-MCNC: 1.58 MG/DL
DEPRECATED RDW RBC AUTO: 40.1 FL (ref 35.1–46.3)
EOSINOPHIL # BLD AUTO: 0.36 X10(3) UL (ref 0–0.7)
EOSINOPHIL NFR BLD AUTO: 5.4 %
ERYTHROCYTE [DISTWIDTH] IN BLOOD BY AUTOMATED COUNT: 11.6 % (ref 11–15)
EST. AVERAGE GLUCOSE BLD GHB EST-MCNC: 280 MG/DL (ref 68–126)
GLUCOSE BLD-MCNC: 462 MG/DL (ref 70–99)
HBA1C MFR BLD HPLC: 11.4 % (ref ?–5.7)
HCT VFR BLD AUTO: 38.3 %
HCT VFR BLD CALC: NORMAL %
HDLC SERPL-MCNC: 50 MG/DL
HDLC SERPL-MCNC: 50 MG/DL (ref 40–59)
HGB BLD-MCNC: 12.5 G/DL
HGB BLD-MCNC: 12.5 G/DL
IMM GRANULOCYTES # BLD AUTO: 0.01 X10(3) UL (ref 0–1)
IMM GRANULOCYTES NFR BLD: 0.2 %
LDLC SERPL CALC-MCNC: 100 MG/DL
LDLC SERPL CALC-MCNC: 100 MG/DL (ref ?–100)
LENGTH OF FAST TIME PATIENT: NO H
LYMPHOCYTES # BLD AUTO: 1.31 X10(3) UL (ref 1–4)
LYMPHOCYTES NFR BLD AUTO: 19.7 %
MCH RBC QN AUTO: 30.9 PG
MCH RBC QN AUTO: 30.9 PG (ref 26–34)
MCHC RBC AUTO-ENTMCNC: 32.6 G/DL
MCHC RBC AUTO-ENTMCNC: 32.6 G/DL (ref 31–37)
MCV RBC AUTO: 94.8 FL
MCV RBC AUTO: 94.8 FL
MONOCYTES # BLD AUTO: 0.47 X10(3) UL (ref 0.1–1)
MONOCYTES NFR BLD AUTO: 7.1 %
NEUTROPHILS # BLD AUTO: 4.41 X10 (3) UL (ref 1.5–7.7)
NEUTROPHILS # BLD AUTO: 4.41 X10(3) UL (ref 1.5–7.7)
NEUTROPHILS NFR BLD AUTO: 66.1 %
NONHDLC SERPL-MCNC: 134 MG/DL
NONHDLC SERPL-MCNC: 134 MG/DL (ref ?–130)
OSMOLALITY SERPL CALC.SUM OF ELEC: 313 MOSM/KG (ref 275–295)
PATIENT FASTING Y/N/NP: NO
PATIENT FASTING Y/N/NP: NO
PLATELET # BLD AUTO: 163 10(3)UL (ref 150–450)
PLATELET # BLD: 163 K/MCL
POTASSIUM SERPL-SCNC: 4.4 MMOL/L (ref 3.5–5.1)
RBC # BLD AUTO: 4.04 X10(6)UL
RBC # BLD: 4.04 10*6/UL
SODIUM SERPL-SCNC: 136 MMOL/L (ref 136–145)
TRIGL SERPL-MCNC: 168 MG/DL
TRIGL SERPL-MCNC: 168 MG/DL (ref 30–149)
VLDLC SERPL CALC-MCNC: 34 MG/DL
VLDLC SERPL CALC-MCNC: 34 MG/DL (ref 0–30)
WBC # BLD AUTO: 6.7 X10(3) UL (ref 4–11)
WBC # BLD: 6.7 K/MCL

## 2021-01-08 PROCEDURE — 80048 BASIC METABOLIC PNL TOTAL CA: CPT

## 2021-01-08 PROCEDURE — 36415 COLL VENOUS BLD VENIPUNCTURE: CPT

## 2021-01-08 PROCEDURE — 85025 COMPLETE CBC W/AUTO DIFF WBC: CPT

## 2021-01-08 PROCEDURE — 84450 TRANSFERASE (AST) (SGOT): CPT

## 2021-01-08 PROCEDURE — 80061 LIPID PANEL: CPT

## 2021-01-08 PROCEDURE — 83036 HEMOGLOBIN GLYCOSYLATED A1C: CPT

## 2021-01-08 PROCEDURE — 84460 ALANINE AMINO (ALT) (SGPT): CPT

## 2021-01-08 NOTE — TELEPHONE ENCOUNTER
Telephone call to patient's son Mayda Anna. I tried to call the patient and her apartment at [facility] and is unable to reach her. There is no voicemail available. I received blood test today which showed her fasting blood sugar to be 462 this is too high.   P

## 2021-01-08 NOTE — TELEPHONE ENCOUNTER
Telephone call to patient's son Kiley Baez. Kiley Baez has checked with his sister regarding patient's medications. He feels that the patient is currently taking glimepiride 2 mg twice a day.   I have advised him to increase the glipizide to 2 tablets which would be 4

## 2021-01-08 NOTE — TELEPHONE ENCOUNTER
I have subsequently talked to the patient's son Quyen Reyes on 3 occasions this afternoon as noted in some other notes. It turns out that when the patient went to get her blood test today it was nonfasting.   Patient is currently taking her glimepiride 2 mg twice

## 2021-01-08 NOTE — TELEPHONE ENCOUNTER
I spoke to Dori Gann a third time. It turns out that his mother was not fasting today at the time of her blood test.  She is to take 2 glimepiride tonight. She is then to continue take 1 glimepiride twice a day.   I will place a order in the system for the angel

## 2021-01-08 NOTE — TELEPHONE ENCOUNTER
Dr. Martha Rivera at [facility] lab calling with a critical glucose of 462.

## 2021-01-13 ENCOUNTER — OFFICE VISIT (OUTPATIENT)
Dept: INTERNAL MEDICINE CLINIC | Facility: CLINIC | Age: 86
End: 2021-01-13
Payer: MEDICARE

## 2021-01-13 VITALS
SYSTOLIC BLOOD PRESSURE: 120 MMHG | DIASTOLIC BLOOD PRESSURE: 60 MMHG | TEMPERATURE: 98 F | HEART RATE: 72 BPM | BODY MASS INDEX: 25 KG/M2 | OXYGEN SATURATION: 93 % | WEIGHT: 135 LBS

## 2021-01-13 DIAGNOSIS — K59.00 CONSTIPATION, UNSPECIFIED CONSTIPATION TYPE: ICD-10-CM

## 2021-01-13 DIAGNOSIS — G62.9 PERIPHERAL POLYNEUROPATHY: ICD-10-CM

## 2021-01-13 DIAGNOSIS — I10 ESSENTIAL HYPERTENSION: ICD-10-CM

## 2021-01-13 DIAGNOSIS — E11.22 TYPE 2 DIABETES MELLITUS WITH STAGE 3B CHRONIC KIDNEY DISEASE, WITH LONG-TERM CURRENT USE OF INSULIN (HCC): ICD-10-CM

## 2021-01-13 DIAGNOSIS — E11.42 TYPE 2 DIABETES MELLITUS WITH DIABETIC POLYNEUROPATHY, WITH LONG-TERM CURRENT USE OF INSULIN (HCC): Primary | ICD-10-CM

## 2021-01-13 DIAGNOSIS — C50.911 MALIGNANT NEOPLASM OF RIGHT FEMALE BREAST, UNSPECIFIED ESTROGEN RECEPTOR STATUS, UNSPECIFIED SITE OF BREAST (HCC): ICD-10-CM

## 2021-01-13 DIAGNOSIS — E03.9 HYPOTHYROIDISM, UNSPECIFIED TYPE: ICD-10-CM

## 2021-01-13 DIAGNOSIS — Z79.4 TYPE 2 DIABETES MELLITUS WITH DIABETIC POLYNEUROPATHY, WITH LONG-TERM CURRENT USE OF INSULIN (HCC): Primary | ICD-10-CM

## 2021-01-13 DIAGNOSIS — I73.9 PERIPHERAL VASCULAR DISEASE (HCC): ICD-10-CM

## 2021-01-13 DIAGNOSIS — E55.9 VITAMIN D DEFICIENCY: ICD-10-CM

## 2021-01-13 DIAGNOSIS — Z79.4 TYPE 2 DIABETES MELLITUS WITH STAGE 3B CHRONIC KIDNEY DISEASE, WITH LONG-TERM CURRENT USE OF INSULIN (HCC): ICD-10-CM

## 2021-01-13 DIAGNOSIS — N18.32 STAGE 3B CHRONIC KIDNEY DISEASE (HCC): ICD-10-CM

## 2021-01-13 DIAGNOSIS — N18.32 TYPE 2 DIABETES MELLITUS WITH STAGE 3B CHRONIC KIDNEY DISEASE, WITH LONG-TERM CURRENT USE OF INSULIN (HCC): ICD-10-CM

## 2021-01-13 DIAGNOSIS — K21.9 GASTROESOPHAGEAL REFLUX DISEASE WITHOUT ESOPHAGITIS: ICD-10-CM

## 2021-01-13 DIAGNOSIS — Z95.1 S/P CABG X 3: ICD-10-CM

## 2021-01-13 DIAGNOSIS — I25.10 ASHD (ARTERIOSCLEROTIC HEART DISEASE): ICD-10-CM

## 2021-01-13 DIAGNOSIS — E78.00 HYPERCHOLESTEROLEMIA: ICD-10-CM

## 2021-01-13 DIAGNOSIS — R53.83 FATIGUE, UNSPECIFIED TYPE: ICD-10-CM

## 2021-01-13 PROCEDURE — 99214 OFFICE O/P EST MOD 30 MIN: CPT | Performed by: INTERNAL MEDICINE

## 2021-01-13 RX ORDER — AMLODIPINE BESYLATE 5 MG/1
5 TABLET ORAL DAILY
COMMUNITY
End: 2021-05-07

## 2021-01-13 RX ORDER — EMPAGLIFLOZIN 10 MG/1
10 TABLET, FILM COATED ORAL
COMMUNITY
End: 2021-01-13 | Stop reason: DRUGHIGH

## 2021-01-13 RX ORDER — EMPAGLIFLOZIN 10 MG/1
20 TABLET, FILM COATED ORAL
Qty: 60 TABLET | Refills: 11 | Status: SHIPPED | OUTPATIENT
Start: 2021-01-13 | End: 2021-04-29

## 2021-01-13 NOTE — PATIENT INSTRUCTIONS
1.  Patient is to continue her current diet, medication and activity. 2.  I will increase the patient's Jardiance to 20 mg orally every morning. 3.  Patient's glimepiride has been stopped. 4.  Patient will have a BMP in 1 month.   5.  I will see the angel

## 2021-01-13 NOTE — PROGRESS NOTES
Padmini Ward is a 80year old female. Patient presents with:  Checkup: 3 mo f/u  Diabetes  Ashd  Hypertension  Hyperlipidemia    HPI:   Patient presents with:  Checkup: 3 mo f/u  Diabetes  Ashd  Hypertension  Hyperlipidemia    Patient is seen today wit Inject 12 Units into the skin every morning. (2 units additional for test dose) 15 mL 3   • Levothyroxine Sodium (SYNTHROID) 75 MCG Oral Tab Take 1 tablet (75 mcg total) by mouth before breakfast. 90 tablet 3   • Pravastatin Sodium 40 MG Oral Tab Take 1 ta status: Never Smoker      Smokeless tobacco: Never Used    Alcohol use: No      Alcohol/week: 0.0 standard drinks    Drug use: No       REVIEW OF SYSTEMS:   GENERAL HEALTH: feels well otherwise  RESPIRATORY:No cough or SOB  CARDIOVASCULAR: No chest pain  G patient's medications. In the past patient is having difficulty with leg swelling due to amlodipine but so far she is not had this. Patient is to continue her current diet, medications and activity.   I will see the patient back in 2 months as noted above

## 2021-01-25 ENCOUNTER — TELEPHONE (OUTPATIENT)
Dept: INTERNAL MEDICINE CLINIC | Facility: CLINIC | Age: 86
End: 2021-01-25

## 2021-01-25 ENCOUNTER — PATIENT MESSAGE (OUTPATIENT)
Dept: INTERNAL MEDICINE CLINIC | Facility: CLINIC | Age: 86
End: 2021-01-25

## 2021-01-25 DIAGNOSIS — E11.42 TYPE 2 DIABETES MELLITUS WITH DIABETIC POLYNEUROPATHY, WITH LONG-TERM CURRENT USE OF INSULIN (HCC): Primary | ICD-10-CM

## 2021-01-25 DIAGNOSIS — Z79.4 TYPE 2 DIABETES MELLITUS WITH DIABETIC POLYNEUROPATHY, WITH LONG-TERM CURRENT USE OF INSULIN (HCC): Primary | ICD-10-CM

## 2021-01-25 NOTE — TELEPHONE ENCOUNTER
From: Jaida Brumfield  To:  Bety Kenny MD  Sent: 1/25/2021 12:32 PM CST  Subject: Referral Request    Dr. Pretty Glover,  I know I am suppose to get my mother's simple blood test for her diabetes, and, I can still do so; however, I have been testing i

## 2021-01-25 NOTE — TELEPHONE ENCOUNTER
----- Message from Tommy Swanson.  Shantanu Luz sent at 1/25/2021 12:32 PM CST -----  Regarding: Referral Request  Contact: [phone]  Dr. Miguel Isidro,  I know I am suppose to get my mother's simple blood test for her diabetes, and, I can still do so; however, I ha

## 2021-01-25 NOTE — TELEPHONE ENCOUNTER
----- Message from Jodi Colindres.  Kyle Hightower sent at 1/25/2021 12:32 PM CST -----  Regarding: Referral Request  Contact: [phone]  Dr. Pelaez Portal,  I know I am suppose to get my mother's simple blood test for her diabetes, and, I can still do so; however, I ha

## 2021-01-26 DIAGNOSIS — Z23 NEED FOR VACCINATION: ICD-10-CM

## 2021-01-26 NOTE — TELEPHONE ENCOUNTER
Please call patient's son Gracy Arriaga, and give him the office address and office telephone number for Dr Drew Roberto. I will route this to nursing.   Thank you!!

## 2021-02-15 RX ORDER — PRAVASTATIN SODIUM 40 MG
40 TABLET ORAL NIGHTLY
Qty: 90 TABLET | Refills: 3 | Status: SHIPPED | OUTPATIENT
Start: 2021-02-15 | End: 2022-01-31

## 2021-02-25 RX ORDER — LEVOTHYROXINE SODIUM 0.07 MG/1
TABLET ORAL
Qty: 90 TABLET | Refills: 3 | Status: SHIPPED | OUTPATIENT
Start: 2021-02-25

## 2021-03-09 ENCOUNTER — TELEPHONE (OUTPATIENT)
Dept: INTERNAL MEDICINE CLINIC | Facility: CLINIC | Age: 86
End: 2021-03-09

## 2021-03-09 ENCOUNTER — PATIENT MESSAGE (OUTPATIENT)
Dept: INTERNAL MEDICINE CLINIC | Facility: CLINIC | Age: 86
End: 2021-03-09

## 2021-03-09 NOTE — TELEPHONE ENCOUNTER
From: Nati Watson  To: Magy Mckeon MD  Sent: 3/9/2021 7:35 AM CST  Subject: Non-Urgent Medical Question    Good morning Dr. Jurgen Harris. We made an appointment with Dr. Buddy Valenzuela right after my mom-Elvie Jacobs Guzmanshayy- last appointment with you.  The ear

## 2021-03-09 NOTE — TELEPHONE ENCOUNTER
Munira Wagner  to Nahum Dial MD        3/9/21 7:35 AM  Good morning Dr. Sophy Dang. We made an appointment with Dr. Lucio Pineda right after my mom-Elvie Swenson- last appointment with you. The earliest appt we can get with Dr Lucio Pineda is April 25.  Marlen Peralta

## 2021-03-30 ENCOUNTER — LAB ENCOUNTER (OUTPATIENT)
Dept: LAB | Age: 86
End: 2021-03-30
Attending: INTERNAL MEDICINE
Payer: MEDICARE

## 2021-03-30 DIAGNOSIS — E11.9 TYPE 2 DIABETES MELLITUS WITHOUT COMPLICATION, WITH LONG-TERM CURRENT USE OF INSULIN (HCC): ICD-10-CM

## 2021-03-30 DIAGNOSIS — E11.22 TYPE 2 DIABETES MELLITUS WITH STAGE 3B CHRONIC KIDNEY DISEASE, WITH LONG-TERM CURRENT USE OF INSULIN (HCC): ICD-10-CM

## 2021-03-30 DIAGNOSIS — Z79.4 TYPE 2 DIABETES MELLITUS WITHOUT COMPLICATION, WITH LONG-TERM CURRENT USE OF INSULIN (HCC): ICD-10-CM

## 2021-03-30 DIAGNOSIS — Z79.4 TYPE 2 DIABETES MELLITUS WITH STAGE 3B CHRONIC KIDNEY DISEASE, WITH LONG-TERM CURRENT USE OF INSULIN (HCC): ICD-10-CM

## 2021-03-30 DIAGNOSIS — E11.42 TYPE 2 DIABETES MELLITUS WITH DIABETIC POLYNEUROPATHY, WITH LONG-TERM CURRENT USE OF INSULIN (HCC): ICD-10-CM

## 2021-03-30 DIAGNOSIS — Z79.4 TYPE 2 DIABETES MELLITUS WITH DIABETIC POLYNEUROPATHY, WITH LONG-TERM CURRENT USE OF INSULIN (HCC): ICD-10-CM

## 2021-03-30 DIAGNOSIS — N18.32 TYPE 2 DIABETES MELLITUS WITH STAGE 3B CHRONIC KIDNEY DISEASE, WITH LONG-TERM CURRENT USE OF INSULIN (HCC): ICD-10-CM

## 2021-03-30 LAB
ANION GAP SERPL CALC-SCNC: 3 MMOL/L
BUN SERPL-MCNC: 40 MG/DL
BUN/CREAT SERPL: 29.2
CALCIUM SERPL-MCNC: 10.5 MG/DL
CHLORIDE SERPL-SCNC: 106 MMOL/L
CO2 SERPL-SCNC: 32 MMOL/L
CREAT SERPL-MCNC: 1.37 MG/DL
GLUCOSE SERPL-MCNC: 192 MG/DL
LENGTH OF FAST TIME PATIENT: YES H
POTASSIUM SERPL-SCNC: 3.9 MMOL/L
SODIUM SERPL-SCNC: 141 MMOL/L

## 2021-03-30 PROCEDURE — 36415 COLL VENOUS BLD VENIPUNCTURE: CPT

## 2021-03-30 PROCEDURE — 80048 BASIC METABOLIC PNL TOTAL CA: CPT

## 2021-03-30 PROCEDURE — 83036 HEMOGLOBIN GLYCOSYLATED A1C: CPT

## 2021-04-01 ENCOUNTER — OFFICE VISIT (OUTPATIENT)
Dept: INTERNAL MEDICINE CLINIC | Facility: CLINIC | Age: 86
End: 2021-04-01
Payer: MEDICARE

## 2021-04-01 VITALS
WEIGHT: 130.13 LBS | BODY MASS INDEX: 23.95 KG/M2 | HEART RATE: 60 BPM | SYSTOLIC BLOOD PRESSURE: 138 MMHG | DIASTOLIC BLOOD PRESSURE: 60 MMHG | OXYGEN SATURATION: 99 % | HEIGHT: 62 IN

## 2021-04-01 DIAGNOSIS — E78.00 HYPERCHOLESTEROLEMIA: ICD-10-CM

## 2021-04-01 DIAGNOSIS — E03.9 HYPOTHYROIDISM, UNSPECIFIED TYPE: ICD-10-CM

## 2021-04-01 DIAGNOSIS — Z95.1 S/P CABG X 3: ICD-10-CM

## 2021-04-01 DIAGNOSIS — I25.10 ASHD (ARTERIOSCLEROTIC HEART DISEASE): ICD-10-CM

## 2021-04-01 DIAGNOSIS — R53.83 FATIGUE, UNSPECIFIED TYPE: ICD-10-CM

## 2021-04-01 DIAGNOSIS — G62.9 PERIPHERAL POLYNEUROPATHY: ICD-10-CM

## 2021-04-01 DIAGNOSIS — I10 ESSENTIAL HYPERTENSION: ICD-10-CM

## 2021-04-01 DIAGNOSIS — E11.22 TYPE 2 DIABETES MELLITUS WITH STAGE 3B CHRONIC KIDNEY DISEASE, WITH LONG-TERM CURRENT USE OF INSULIN (HCC): ICD-10-CM

## 2021-04-01 DIAGNOSIS — E11.42 TYPE 2 DIABETES MELLITUS WITH DIABETIC POLYNEUROPATHY, WITH LONG-TERM CURRENT USE OF INSULIN (HCC): Primary | ICD-10-CM

## 2021-04-01 DIAGNOSIS — C50.911 MALIGNANT NEOPLASM OF RIGHT FEMALE BREAST, UNSPECIFIED ESTROGEN RECEPTOR STATUS, UNSPECIFIED SITE OF BREAST (HCC): ICD-10-CM

## 2021-04-01 DIAGNOSIS — I73.9 PERIPHERAL VASCULAR DISEASE (HCC): ICD-10-CM

## 2021-04-01 DIAGNOSIS — K21.9 GASTROESOPHAGEAL REFLUX DISEASE WITHOUT ESOPHAGITIS: ICD-10-CM

## 2021-04-01 DIAGNOSIS — N18.32 TYPE 2 DIABETES MELLITUS WITH STAGE 3B CHRONIC KIDNEY DISEASE, WITH LONG-TERM CURRENT USE OF INSULIN (HCC): ICD-10-CM

## 2021-04-01 DIAGNOSIS — E55.9 VITAMIN D DEFICIENCY: ICD-10-CM

## 2021-04-01 DIAGNOSIS — N18.32 STAGE 3B CHRONIC KIDNEY DISEASE (HCC): ICD-10-CM

## 2021-04-01 DIAGNOSIS — Z79.4 TYPE 2 DIABETES MELLITUS WITH STAGE 3B CHRONIC KIDNEY DISEASE, WITH LONG-TERM CURRENT USE OF INSULIN (HCC): ICD-10-CM

## 2021-04-01 DIAGNOSIS — Z79.4 TYPE 2 DIABETES MELLITUS WITH DIABETIC POLYNEUROPATHY, WITH LONG-TERM CURRENT USE OF INSULIN (HCC): Primary | ICD-10-CM

## 2021-04-01 DIAGNOSIS — K59.00 CONSTIPATION, UNSPECIFIED CONSTIPATION TYPE: ICD-10-CM

## 2021-04-01 PROCEDURE — 99214 OFFICE O/P EST MOD 30 MIN: CPT | Performed by: INTERNAL MEDICINE

## 2021-04-01 RX ORDER — AMLODIPINE BESYLATE 5 MG/1
5 TABLET ORAL DAILY
Qty: 90 TABLET | Refills: 0 | Status: CANCELLED | OUTPATIENT
Start: 2021-04-01

## 2021-04-01 RX ORDER — INSULIN GLARGINE 100 [IU]/ML
20 INJECTION, SOLUTION SUBCUTANEOUS EVERY MORNING
Qty: 15 ML | Refills: 3 | Status: SHIPPED | OUTPATIENT
Start: 2021-04-01 | End: 2021-04-29

## 2021-04-01 RX ORDER — GLIMEPIRIDE 2 MG/1
2 TABLET ORAL
Qty: 90 TABLET | Refills: 3 | Status: SHIPPED | OUTPATIENT
Start: 2021-04-01 | End: 2021-09-22

## 2021-04-01 NOTE — PROGRESS NOTES
Nati Watson is a 80year old female. Patient presents with:  Checkup: Type 2 DM pt here today for 2 month checkup;  Appt 4/29 with Dr. Buddy Valenzuela due to elevated sugars (goa to get under 200, 210-220 wih spoikes up 230 150 spiked to 180 when up to 20 IU odonnell Tab Take 1 tablet (40 mg total) by mouth nightly. 90 tablet 3   • amLODIPine Besylate 5 MG Oral Tab Take 5 mg by mouth daily.      • Empagliflozin (JARDIANCE) 10 MG Oral Tab Take 20 mg by mouth before breakfast. 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 I will see the patient back in 2 months with blood tests which will include a CBC, BMP, hemoglobin A1c, lipid panel, AST and ALT. I will see the patient back sooner as necessary.   Patient has an appoin

## 2021-04-01 NOTE — PROGRESS NOTES
Here today with son who relayed to us that him and his sister monitor moms blood glucose. Appt 4/29 with Dr. Perla Mena due to elevated sugars. \"Current goal to get under 200. \"    3/30 \"Glucose in the morning of blood draw was 180\".      Glucose typi

## 2021-04-01 NOTE — PATIENT INSTRUCTIONS
1.  Patient is to continue her current diet, medication and activity. 2.  Patient will continue taking Lantus insulin 20 units subcutaneously every day. 3.  I will start the patient on glimepiride 2 mg orally daily.   4.  I will plan to see the patient ba

## 2021-04-29 ENCOUNTER — OFFICE VISIT (OUTPATIENT)
Dept: ENDOCRINOLOGY CLINIC | Facility: CLINIC | Age: 86
End: 2021-04-29
Payer: MEDICARE

## 2021-04-29 VITALS
WEIGHT: 130 LBS | DIASTOLIC BLOOD PRESSURE: 76 MMHG | HEART RATE: 63 BPM | BODY MASS INDEX: 24 KG/M2 | SYSTOLIC BLOOD PRESSURE: 152 MMHG

## 2021-04-29 DIAGNOSIS — E11.65 UNCONTROLLED TYPE 2 DIABETES MELLITUS WITH HYPERGLYCEMIA (HCC): Primary | ICD-10-CM

## 2021-04-29 DIAGNOSIS — M81.8 OTHER OSTEOPOROSIS WITHOUT CURRENT PATHOLOGICAL FRACTURE: ICD-10-CM

## 2021-04-29 PROCEDURE — 82947 ASSAY GLUCOSE BLOOD QUANT: CPT | Performed by: INTERNAL MEDICINE

## 2021-04-29 PROCEDURE — 99204 OFFICE O/P NEW MOD 45 MIN: CPT | Performed by: INTERNAL MEDICINE

## 2021-04-29 PROCEDURE — 83036 HEMOGLOBIN GLYCOSYLATED A1C: CPT | Performed by: INTERNAL MEDICINE

## 2021-04-29 PROCEDURE — 36416 COLLJ CAPILLARY BLOOD SPEC: CPT | Performed by: INTERNAL MEDICINE

## 2021-04-29 RX ORDER — INSULIN GLARGINE 100 [IU]/ML
20 INJECTION, SOLUTION SUBCUTANEOUS EVERY MORNING
Qty: 30 ML | Refills: 3 | Status: SHIPPED | OUTPATIENT
Start: 2021-04-29

## 2021-04-29 NOTE — PATIENT INSTRUCTIONS
Morning Blood Glucose 100-160    DECREASE Jardiance to 10mg daily    CONTINUE Glimepiride 2mg daily    CONTINUE lantus 18 units subcutaneous daily

## 2021-04-29 NOTE — PROGRESS NOTES
Name: Wali Travis  Date: 4/29/2021    Referring Physician: Katey Mcduffie    HISTORY OF PRESENT ILLNESS   Wali Travis is a 80year old female who presents for diabetes mellitus diagnosed over 10 years ago.       Prior HbA, C or glycohemoglobi Empagliflozin (JARDIANCE) 10 MG Oral Tab, Take 20 mg by mouth before breakfast., Disp: 60 tablet, Rfl: 11  •  DILTIAZEM HCL ER COATED BEADS 240 MG Oral Capsule SR 24 Hr, TAKE 1 CAPSULE (240 MG TOTAL) BY MOUTH DAILY. , Disp: 90 capsule, Rfl: 3  •  Lisinopri on file      Years of education: Not on file      Highest education level: Not on file    Tobacco Use      Smoking status: Never Smoker      Smokeless tobacco: Never Used    Vaping Use      Vaping Use: Never used    Substance and Sexual Activity      Alcoh distress  Eyes:  normal conjunctivae, sclera. , normal sclera and normal pupils  Ears/Nose/Mouth/Throat/Neck:  no palpable thyroid nodules or cervical lymphadenopathy  Neck: Trachea midline: Normal  Back: no kyphosis or back tenderness  Respiratory:  clear

## 2021-05-07 RX ORDER — AMLODIPINE BESYLATE 5 MG/1
5 TABLET ORAL DAILY
Qty: 90 TABLET | Refills: 3 | Status: SHIPPED | OUTPATIENT
Start: 2021-05-07

## 2021-05-07 RX ORDER — AMLODIPINE BESYLATE 5 MG/1
5 TABLET ORAL DAILY
Qty: 30 TABLET | Refills: 4 | Status: SHIPPED | OUTPATIENT
Start: 2021-05-07 | End: 2021-11-03

## 2021-05-07 NOTE — TELEPHONE ENCOUNTER
Noted.  I have refilled the patient's amlodipine/Norvasc prescription as requested for 90 days with 3 refills. Please notify the patient's daughter that I have done this. I will route this to nursing.   Thank you!!

## 2021-05-07 NOTE — TELEPHONE ENCOUNTER
Patient's daughter, Julia Coleman, is calling to check the status of the refill amLODIPine 5 MG which [facility] [address] informed her that they requested a week ago.  Julia Coleman was informed that there was no record of the request. Julia Coleman states the patient is now out of th

## 2021-05-07 NOTE — TELEPHONE ENCOUNTER
Our office last refilled the amlodipine for the patient in 2015. Per 1/13/2021 office visit note, \"It turns out that patient's cardiologist, Dr. Panda Lawler, stopped the patient's glimepiride last November and placed the patient on Jardiance 10 mg.   He did no

## 2021-05-11 RX ORDER — BISACODYL 5 MG/1
5 TABLET, DELAYED RELEASE ORAL DAILY PRN
COMMUNITY

## 2021-05-11 RX ORDER — METOPROLOL SUCCINATE 25 MG/1
25 TABLET, EXTENDED RELEASE ORAL DAILY
COMMUNITY
Start: 2020-08-17 | End: 2021-08-17

## 2021-05-12 ENCOUNTER — OFFICE VISIT (OUTPATIENT)
Dept: CARDIOLOGY | Age: 86
End: 2021-05-12

## 2021-05-12 DIAGNOSIS — I25.10 CORONARY ARTERY DISEASE INVOLVING NATIVE CORONARY ARTERY OF NATIVE HEART WITHOUT ANGINA PECTORIS: Primary | ICD-10-CM

## 2021-05-12 DIAGNOSIS — Z95.1 HX OF CABG: ICD-10-CM

## 2021-05-12 DIAGNOSIS — I10 BENIGN HYPERTENSION: ICD-10-CM

## 2021-05-12 DIAGNOSIS — E78.00 HYPERCHOLESTEROLEMIA: ICD-10-CM

## 2021-05-12 PROCEDURE — 99214 OFFICE O/P EST MOD 30 MIN: CPT | Performed by: INTERNAL MEDICINE

## 2021-05-12 ASSESSMENT — PAIN SCALES - GENERAL: PAINLEVEL: 0

## 2021-05-25 VITALS
RESPIRATION RATE: 20 BRPM | DIASTOLIC BLOOD PRESSURE: 60 MMHG | WEIGHT: 130 LBS | SYSTOLIC BLOOD PRESSURE: 140 MMHG | BODY MASS INDEX: 23.03 KG/M2 | OXYGEN SATURATION: 98 % | HEART RATE: 56 BPM

## 2021-06-02 ENCOUNTER — LAB ENCOUNTER (OUTPATIENT)
Dept: LAB | Age: 86
End: 2021-06-02
Attending: INTERNAL MEDICINE
Payer: MEDICARE

## 2021-06-02 DIAGNOSIS — E78.00 HYPERCHOLESTEROLEMIA: ICD-10-CM

## 2021-06-02 DIAGNOSIS — N18.32 STAGE 3B CHRONIC KIDNEY DISEASE (HCC): ICD-10-CM

## 2021-06-02 DIAGNOSIS — Z79.4 TYPE 2 DIABETES MELLITUS WITH DIABETIC POLYNEUROPATHY, WITH LONG-TERM CURRENT USE OF INSULIN (HCC): ICD-10-CM

## 2021-06-02 DIAGNOSIS — R53.83 FATIGUE, UNSPECIFIED TYPE: ICD-10-CM

## 2021-06-02 DIAGNOSIS — E11.42 TYPE 2 DIABETES MELLITUS WITH DIABETIC POLYNEUROPATHY, WITH LONG-TERM CURRENT USE OF INSULIN (HCC): ICD-10-CM

## 2021-06-02 PROCEDURE — 84450 TRANSFERASE (AST) (SGOT): CPT

## 2021-06-02 PROCEDURE — 84460 ALANINE AMINO (ALT) (SGPT): CPT

## 2021-06-02 PROCEDURE — 83036 HEMOGLOBIN GLYCOSYLATED A1C: CPT

## 2021-06-02 PROCEDURE — 85025 COMPLETE CBC W/AUTO DIFF WBC: CPT

## 2021-06-02 PROCEDURE — 80048 BASIC METABOLIC PNL TOTAL CA: CPT

## 2021-06-02 PROCEDURE — 36415 COLL VENOUS BLD VENIPUNCTURE: CPT

## 2021-06-02 PROCEDURE — 80061 LIPID PANEL: CPT

## 2021-06-03 ENCOUNTER — OFFICE VISIT (OUTPATIENT)
Dept: INTERNAL MEDICINE CLINIC | Facility: CLINIC | Age: 86
End: 2021-06-03
Payer: MEDICARE

## 2021-06-03 VITALS
WEIGHT: 132 LBS | HEART RATE: 52 BPM | HEIGHT: 62 IN | OXYGEN SATURATION: 98 % | BODY MASS INDEX: 24.29 KG/M2 | SYSTOLIC BLOOD PRESSURE: 130 MMHG | DIASTOLIC BLOOD PRESSURE: 70 MMHG | TEMPERATURE: 98 F

## 2021-06-03 DIAGNOSIS — Z00.00 ANNUAL PHYSICAL EXAM: Primary | ICD-10-CM

## 2021-06-03 DIAGNOSIS — I73.9 PERIPHERAL VASCULAR DISEASE (HCC): ICD-10-CM

## 2021-06-03 DIAGNOSIS — G62.9 PERIPHERAL POLYNEUROPATHY: ICD-10-CM

## 2021-06-03 DIAGNOSIS — E55.9 VITAMIN D DEFICIENCY: ICD-10-CM

## 2021-06-03 DIAGNOSIS — Z79.4 TYPE 2 DIABETES MELLITUS WITH DIABETIC POLYNEUROPATHY, WITH LONG-TERM CURRENT USE OF INSULIN (HCC): ICD-10-CM

## 2021-06-03 DIAGNOSIS — Z95.1 S/P CABG X 3: ICD-10-CM

## 2021-06-03 DIAGNOSIS — R53.83 FATIGUE, UNSPECIFIED TYPE: ICD-10-CM

## 2021-06-03 DIAGNOSIS — I10 ESSENTIAL HYPERTENSION: ICD-10-CM

## 2021-06-03 DIAGNOSIS — N18.32 STAGE 3B CHRONIC KIDNEY DISEASE (HCC): ICD-10-CM

## 2021-06-03 DIAGNOSIS — D69.6 THROMBOCYTOPENIA (HCC): ICD-10-CM

## 2021-06-03 DIAGNOSIS — C50.911 MALIGNANT NEOPLASM OF RIGHT FEMALE BREAST, UNSPECIFIED ESTROGEN RECEPTOR STATUS, UNSPECIFIED SITE OF BREAST (HCC): ICD-10-CM

## 2021-06-03 DIAGNOSIS — K21.9 GASTROESOPHAGEAL REFLUX DISEASE WITHOUT ESOPHAGITIS: ICD-10-CM

## 2021-06-03 DIAGNOSIS — N18.32 TYPE 2 DIABETES MELLITUS WITH STAGE 3B CHRONIC KIDNEY DISEASE, WITH LONG-TERM CURRENT USE OF INSULIN (HCC): ICD-10-CM

## 2021-06-03 DIAGNOSIS — E11.42 TYPE 2 DIABETES MELLITUS WITH DIABETIC POLYNEUROPATHY, WITH LONG-TERM CURRENT USE OF INSULIN (HCC): ICD-10-CM

## 2021-06-03 DIAGNOSIS — E11.22 TYPE 2 DIABETES MELLITUS WITH STAGE 3B CHRONIC KIDNEY DISEASE, WITH LONG-TERM CURRENT USE OF INSULIN (HCC): ICD-10-CM

## 2021-06-03 DIAGNOSIS — Z79.4 TYPE 2 DIABETES MELLITUS WITH STAGE 3B CHRONIC KIDNEY DISEASE, WITH LONG-TERM CURRENT USE OF INSULIN (HCC): ICD-10-CM

## 2021-06-03 DIAGNOSIS — I25.10 ASHD (ARTERIOSCLEROTIC HEART DISEASE): ICD-10-CM

## 2021-06-03 DIAGNOSIS — K59.00 CONSTIPATION, UNSPECIFIED CONSTIPATION TYPE: ICD-10-CM

## 2021-06-03 DIAGNOSIS — E78.00 HYPERCHOLESTEROLEMIA: ICD-10-CM

## 2021-06-03 DIAGNOSIS — E03.9 HYPOTHYROIDISM, UNSPECIFIED TYPE: ICD-10-CM

## 2021-06-03 PROCEDURE — 96127 BRIEF EMOTIONAL/BEHAV ASSMT: CPT | Performed by: INTERNAL MEDICINE

## 2021-06-03 PROCEDURE — G0439 PPPS, SUBSEQ VISIT: HCPCS | Performed by: INTERNAL MEDICINE

## 2021-06-03 PROCEDURE — 99212 OFFICE O/P EST SF 10 MIN: CPT | Performed by: INTERNAL MEDICINE

## 2021-06-03 PROCEDURE — 93000 ELECTROCARDIOGRAM COMPLETE: CPT | Performed by: INTERNAL MEDICINE

## 2021-06-03 NOTE — PROGRESS NOTES
HPI:   Kyra Gasca is a 80year old female who was seen by me on Anya 3, 2021 for her Medicare annual physical examination. At the time of examination Mrs. Brittany Castro was feeling well. She is seen with her son, Jasmin Funes, today.   Patient has recently return Lisinopril-hydroCHLOROthiazide 20-25 MG Oral Tab Take 1 tablet by mouth 2 (two) times a day. 180 tablet 3   • Vitamin C 500 MG Oral Tab Take 1 tablet (500 mg total) by mouth daily.  90 tablet 3   • Calcium Carbonate-Vitamin D (OSCAL 500/200 D-3) 500-200 MG- uncontrolled    • Unspecified essential hypertension       Past Surgical History:   Procedure Laterality Date   • BREAST LUMPECTOMY     • CABG  11/11/2016   • COLONOSCOPY     • DEBRIDEMENT OF NAILS, 6 OR MORE Bilateral 2009    complete debridement of all 1 EOMI,conjunctiva are clear, sclerae are nonicteric  NECK: supple, no cervical or supraclavicular LAD, no carotid bruits, no JVD  CHEST: no chest tenderness  BREAST: no dominant or suspicious mass, no axillary LAD.   Patient's breasts appear normal.  Patient as she is scheduled to do. - ELECTROCARDIOGRAM, COMPLETE    2. Type 2 diabetes mellitus with diabetic polyneuropathy, with long-term current use of insulin ([facility])  Doing well.  CPM.  Patient's recent FBS was 105. Her hemoglobin A1c was 7.5.   Patient odonnell right female breast, unspecified estrogen receptor status, unspecified site of breast ([facility] [address]  Stable. CPM.    15. Fatigue, unspecified type  Doing well.  CPM.    - CBC WITH DIFFERENTIAL WITH PLATELET; Future  - ASSAY, THYROID STIM HORMONE; Future    16.  Ty Problems?: Yes      Fall/Risk Assessment     Do you have 3 or more medical conditions?: 1-Yes    Have you fallen in the last 12 months?: 1-Yes    Do you accidently lose urine?: 1-Yes    Do you have difficulty seeing?: 0-No    Do you have any difficulty wal What day of the week is this?: Incorrect    What month is it?: Correct    What year is it?: Incorrect    Recall \"Ball\": Correct    Recall \"Flag\":  Incorrect    Recall \"Tree\": Correct        Michelle Rinku Chi's SCREENING SCHEDULE   Tests on this list Immunizations      Influenza No orders found for this or any previous visit.  Update Immunization Activity if applicable    Pneumococcal Orders placed or performed in visit on 03/08/19   • PNEUMOCOCCAL IMM (PNEUMOVAX)    Update Immunization Activity if ap

## 2021-06-03 NOTE — PATIENT INSTRUCTIONS
1.  Patient is to continue her current diet, medication and activity. 2.  Patient has had her Covid vaccine. 3.  I will see the patient back in 3 months with blood tests and urinalysis as ordered. 4.  I will see the patient back sooner as necessary.   5.

## 2021-06-11 RX ORDER — PEN NEEDLE, DIABETIC 32GX 5/32"
NEEDLE, DISPOSABLE MISCELLANEOUS
Qty: 100 EACH | Refills: 3 | Status: SHIPPED | OUTPATIENT
Start: 2021-06-11

## 2021-08-02 ENCOUNTER — OFFICE VISIT (OUTPATIENT)
Dept: ENDOCRINOLOGY CLINIC | Facility: CLINIC | Age: 86
End: 2021-08-02
Payer: MEDICARE

## 2021-08-02 VITALS
BODY MASS INDEX: 25 KG/M2 | SYSTOLIC BLOOD PRESSURE: 157 MMHG | DIASTOLIC BLOOD PRESSURE: 72 MMHG | HEART RATE: 52 BPM | WEIGHT: 135 LBS

## 2021-08-02 DIAGNOSIS — E11.65 UNCONTROLLED TYPE 2 DIABETES MELLITUS WITH HYPERGLYCEMIA (HCC): Primary | ICD-10-CM

## 2021-08-02 DIAGNOSIS — M81.0 AGE-RELATED OSTEOPOROSIS WITHOUT CURRENT PATHOLOGICAL FRACTURE: ICD-10-CM

## 2021-08-02 LAB
CARTRIDGE LOT#: ABNORMAL NUMERIC
GLUCOSE BLOOD: 143
HEMOGLOBIN A1C: 6.9 % (ref 4.3–5.6)
TEST STRIP LOT #: NORMAL NUMERIC

## 2021-08-02 PROCEDURE — 99214 OFFICE O/P EST MOD 30 MIN: CPT | Performed by: INTERNAL MEDICINE

## 2021-08-02 PROCEDURE — 82947 ASSAY GLUCOSE BLOOD QUANT: CPT | Performed by: INTERNAL MEDICINE

## 2021-08-02 PROCEDURE — 83036 HEMOGLOBIN GLYCOSYLATED A1C: CPT | Performed by: INTERNAL MEDICINE

## 2021-08-02 PROCEDURE — 36416 COLLJ CAPILLARY BLOOD SPEC: CPT | Performed by: INTERNAL MEDICINE

## 2021-08-02 NOTE — PROGRESS NOTES
Name: Zonia Boyer  Date: 8/2/2021    Referring Physician: No ref. provider found    HISTORY OF PRESENT ILLNESS   Zonia Boyer is a 80year old female who presents for diabetes mellitus diagnosed over 10 years ago.       Prior HbA, C or glycohemog MOUTH BEFORE BREAKFAST., Disp: 90 tablet, Rfl: 3  •  Pravastatin Sodium 40 MG Oral Tab, Take 1 tablet (40 mg total) by mouth nightly., Disp: 90 tablet, Rfl: 3  •  DILTIAZEM HCL ER COATED BEADS 240 MG Oral Capsule SR 24 Hr, TAKE 1 CAPSULE (240 MG TOTAL) BY level: Not on file    Tobacco Use      Smoking status: Never Smoker      Smokeless tobacco: Never Used    Vaping Use      Vaping Use: Never used    Substance and Sexual Activity      Alcohol use: No        Alcohol/week: 0.0 standard drinks      Drug use: N pupils  Ears/Nose/Mouth/Throat/Neck:  no palpable thyroid nodules or cervical lymphadenopathy  Neck: Trachea midline: Normal  Back: no kyphosis or back tenderness  Musculoskeletal:  normal muscle strength and tone  Skin:  normal moisture and skin texture

## 2021-09-08 ENCOUNTER — OFFICE VISIT (OUTPATIENT)
Dept: INTERNAL MEDICINE CLINIC | Facility: CLINIC | Age: 86
End: 2021-09-08
Payer: MEDICARE

## 2021-09-08 VITALS
HEART RATE: 60 BPM | HEIGHT: 62 IN | WEIGHT: 138 LBS | BODY MASS INDEX: 25.4 KG/M2 | SYSTOLIC BLOOD PRESSURE: 130 MMHG | DIASTOLIC BLOOD PRESSURE: 60 MMHG | OXYGEN SATURATION: 96 % | TEMPERATURE: 99 F

## 2021-09-08 DIAGNOSIS — E78.00 HYPERCHOLESTEROLEMIA: ICD-10-CM

## 2021-09-08 DIAGNOSIS — N18.32 TYPE 2 DIABETES MELLITUS WITH STAGE 3B CHRONIC KIDNEY DISEASE, WITH LONG-TERM CURRENT USE OF INSULIN (HCC): ICD-10-CM

## 2021-09-08 DIAGNOSIS — K21.9 GASTROESOPHAGEAL REFLUX DISEASE WITHOUT ESOPHAGITIS: ICD-10-CM

## 2021-09-08 DIAGNOSIS — C50.911 MALIGNANT NEOPLASM OF RIGHT FEMALE BREAST, UNSPECIFIED ESTROGEN RECEPTOR STATUS, UNSPECIFIED SITE OF BREAST (HCC): ICD-10-CM

## 2021-09-08 DIAGNOSIS — E03.9 HYPOTHYROIDISM, UNSPECIFIED TYPE: ICD-10-CM

## 2021-09-08 DIAGNOSIS — G62.9 PERIPHERAL POLYNEUROPATHY: ICD-10-CM

## 2021-09-08 DIAGNOSIS — E11.42 TYPE 2 DIABETES MELLITUS WITH DIABETIC POLYNEUROPATHY, WITH LONG-TERM CURRENT USE OF INSULIN (HCC): Primary | ICD-10-CM

## 2021-09-08 DIAGNOSIS — K59.00 CONSTIPATION, UNSPECIFIED CONSTIPATION TYPE: ICD-10-CM

## 2021-09-08 DIAGNOSIS — R53.83 FATIGUE, UNSPECIFIED TYPE: ICD-10-CM

## 2021-09-08 DIAGNOSIS — E55.9 VITAMIN D DEFICIENCY: ICD-10-CM

## 2021-09-08 DIAGNOSIS — I25.10 ASHD (ARTERIOSCLEROTIC HEART DISEASE): ICD-10-CM

## 2021-09-08 DIAGNOSIS — Z79.4 TYPE 2 DIABETES MELLITUS WITH STAGE 3B CHRONIC KIDNEY DISEASE, WITH LONG-TERM CURRENT USE OF INSULIN (HCC): ICD-10-CM

## 2021-09-08 DIAGNOSIS — Z95.1 S/P CABG X 3: ICD-10-CM

## 2021-09-08 DIAGNOSIS — D69.6 THROMBOCYTOPENIA (HCC): ICD-10-CM

## 2021-09-08 DIAGNOSIS — I10 ESSENTIAL HYPERTENSION: ICD-10-CM

## 2021-09-08 DIAGNOSIS — I73.9 PERIPHERAL VASCULAR DISEASE (HCC): ICD-10-CM

## 2021-09-08 DIAGNOSIS — E11.22 TYPE 2 DIABETES MELLITUS WITH STAGE 3B CHRONIC KIDNEY DISEASE, WITH LONG-TERM CURRENT USE OF INSULIN (HCC): ICD-10-CM

## 2021-09-08 DIAGNOSIS — N18.32 STAGE 3B CHRONIC KIDNEY DISEASE (HCC): ICD-10-CM

## 2021-09-08 DIAGNOSIS — Z79.4 TYPE 2 DIABETES MELLITUS WITH DIABETIC POLYNEUROPATHY, WITH LONG-TERM CURRENT USE OF INSULIN (HCC): Primary | ICD-10-CM

## 2021-09-08 PROCEDURE — 99214 OFFICE O/P EST MOD 30 MIN: CPT | Performed by: INTERNAL MEDICINE

## 2021-09-08 NOTE — PROGRESS NOTES
Jonathan Dick is a 80year old female. Patient presents with:  Checkup: 3 months   Diabetes  Ashd  Hypertension  Hyperlipidemia    HPI:   Patient presents with:  Checkup: 3 months   Diabetes  Ashd  Hypertension  Hyperlipidemia    Pt feels well.  Pt has n as directed by chris. 300 each 3   • Multiple Vitamins-Minerals (CENTRUM SILVER) Oral Tab Take 1 tablet by mouth daily. CENTRUM SILVER (unknown strength)      • Pyridoxine HCl (VITAMIN B-6) 50 MG Oral Tab Take  by mouth.  1 tab daily        Past Medical masses, no bruits  CHEST: Patient's breasts were not examined today. LUNGS: clear to auscultation  CARDIO: RRR, normal S1S2, without murmur   GI:Protuberant, BS are present, no organomegaly or palpable masses  EXTREMITIES: no edema.   Patient has some ecch Patient is to continue her vitamin D supplementation. 14. 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   CPM.    15. Fatigue, unspecified type  Doing well.  CPM.    16. Thrombo

## 2021-09-08 NOTE — PATIENT INSTRUCTIONS
1.  Patient is to continue her current diet, medication and activity. 2.  Patient will have previously ordered blood test performed sometime in the next month or 2. I will await those results. 3.  Patient has had her COVID vaccine.   She will be due for

## 2021-09-14 ENCOUNTER — LAB ENCOUNTER (OUTPATIENT)
Dept: LAB | Facility: HOSPITAL | Age: 86
End: 2021-09-14
Attending: INTERNAL MEDICINE
Payer: MEDICARE

## 2021-09-14 DIAGNOSIS — E55.9 VITAMIN D DEFICIENCY: ICD-10-CM

## 2021-09-14 DIAGNOSIS — E11.42 TYPE 2 DIABETES MELLITUS WITH DIABETIC POLYNEUROPATHY, WITH LONG-TERM CURRENT USE OF INSULIN (HCC): ICD-10-CM

## 2021-09-14 DIAGNOSIS — E03.9 HYPOTHYROIDISM, UNSPECIFIED TYPE: ICD-10-CM

## 2021-09-14 DIAGNOSIS — Z79.4 TYPE 2 DIABETES MELLITUS WITH DIABETIC POLYNEUROPATHY, WITH LONG-TERM CURRENT USE OF INSULIN (HCC): ICD-10-CM

## 2021-09-14 DIAGNOSIS — D69.6 THROMBOCYTOPENIA (HCC): ICD-10-CM

## 2021-09-14 DIAGNOSIS — N18.32 STAGE 3B CHRONIC KIDNEY DISEASE (HCC): ICD-10-CM

## 2021-09-14 DIAGNOSIS — E78.00 HYPERCHOLESTEROLEMIA: ICD-10-CM

## 2021-09-14 DIAGNOSIS — R53.83 FATIGUE, UNSPECIFIED TYPE: ICD-10-CM

## 2021-09-14 LAB
ALBUMIN SERPL-MCNC: 3.7 G/DL (ref 3.4–5)
ALBUMIN/GLOB SERPL: 1.1 {RATIO} (ref 1–2)
ALP LIVER SERPL-CCNC: 57 U/L
ALT SERPL-CCNC: 24 U/L
ANION GAP SERPL CALC-SCNC: 6 MMOL/L (ref 0–18)
AST SERPL-CCNC: 22 U/L (ref 15–37)
BASOPHILS # BLD AUTO: 0.1 X10(3) UL (ref 0–0.2)
BASOPHILS NFR BLD AUTO: 1.7 %
BILIRUB SERPL-MCNC: 0.5 MG/DL (ref 0.1–2)
BILIRUB UR QL: NEGATIVE
BUN BLD-MCNC: 32 MG/DL (ref 7–18)
BUN/CREAT SERPL: 27.6 (ref 10–20)
CALCIUM BLD-MCNC: 9.7 MG/DL (ref 8.5–10.1)
CHLORIDE SERPL-SCNC: 106 MMOL/L (ref 98–112)
CHOLEST SERPL-MCNC: 170 MG/DL (ref ?–200)
CLARITY UR: CLEAR
CO2 SERPL-SCNC: 29 MMOL/L (ref 21–32)
COLOR UR: YELLOW
CREAT BLD-MCNC: 1.16 MG/DL
DEPRECATED RDW RBC AUTO: 42.1 FL (ref 35.1–46.3)
EOSINOPHIL # BLD AUTO: 0.66 X10(3) UL (ref 0–0.7)
EOSINOPHIL NFR BLD AUTO: 11 %
ERYTHROCYTE [DISTWIDTH] IN BLOOD BY AUTOMATED COUNT: 11.9 % (ref 11–15)
EST. AVERAGE GLUCOSE BLD GHB EST-MCNC: 160 MG/DL (ref 68–126)
GLOBULIN PLAS-MCNC: 3.5 G/DL (ref 2.8–4.4)
GLUCOSE BLD-MCNC: 156 MG/DL (ref 70–99)
GLUCOSE UR-MCNC: >=500 MG/DL
HBA1C MFR BLD HPLC: 7.2 % (ref ?–5.7)
HCT VFR BLD AUTO: 39 %
HDLC SERPL-MCNC: 59 MG/DL (ref 40–59)
HGB BLD-MCNC: 12.6 G/DL
HGB UR QL STRIP.AUTO: NEGATIVE
IMM GRANULOCYTES # BLD AUTO: 0.01 X10(3) UL (ref 0–1)
IMM GRANULOCYTES NFR BLD: 0.2 %
KETONES UR-MCNC: NEGATIVE MG/DL
LDLC SERPL CALC-MCNC: 97 MG/DL (ref ?–100)
LYMPHOCYTES # BLD AUTO: 1.63 X10(3) UL (ref 1–4)
LYMPHOCYTES NFR BLD AUTO: 27.1 %
MCH RBC QN AUTO: 31.3 PG (ref 26–34)
MCHC RBC AUTO-ENTMCNC: 32.3 G/DL (ref 31–37)
MCV RBC AUTO: 96.8 FL
MONOCYTES # BLD AUTO: 0.52 X10(3) UL (ref 0.1–1)
MONOCYTES NFR BLD AUTO: 8.7 %
NEUTROPHILS # BLD AUTO: 3.09 X10 (3) UL (ref 1.5–7.7)
NEUTROPHILS # BLD AUTO: 3.09 X10(3) UL (ref 1.5–7.7)
NEUTROPHILS NFR BLD AUTO: 51.3 %
NITRITE UR QL STRIP.AUTO: NEGATIVE
NONHDLC SERPL-MCNC: 111 MG/DL (ref ?–130)
OSMOLALITY SERPL CALC.SUM OF ELEC: 302 MOSM/KG (ref 275–295)
PATIENT FASTING Y/N/NP: NO
PATIENT FASTING Y/N/NP: NO
PH UR: 6 [PH] (ref 5–8)
PLATELET # BLD AUTO: 154 10(3)UL (ref 150–450)
POTASSIUM SERPL-SCNC: 4.1 MMOL/L (ref 3.5–5.1)
PROT SERPL-MCNC: 7.2 G/DL (ref 6.4–8.2)
PROT UR-MCNC: NEGATIVE MG/DL
RBC # BLD AUTO: 4.03 X10(6)UL
SODIUM SERPL-SCNC: 141 MMOL/L (ref 136–145)
SP GR UR STRIP: 1.02 (ref 1–1.03)
TRIGL SERPL-MCNC: 77 MG/DL (ref 30–149)
TSI SER-ACNC: 3.39 MIU/ML (ref 0.36–3.74)
UROBILINOGEN UR STRIP-ACNC: <2
VIT D+METAB SERPL-MCNC: 39.1 NG/ML (ref 30–100)
VLDLC SERPL CALC-MCNC: 13 MG/DL (ref 0–30)
WBC # BLD AUTO: 6 X10(3) UL (ref 4–11)

## 2021-09-14 PROCEDURE — 36415 COLL VENOUS BLD VENIPUNCTURE: CPT

## 2021-09-14 PROCEDURE — 85025 COMPLETE CBC W/AUTO DIFF WBC: CPT

## 2021-09-14 PROCEDURE — 82306 VITAMIN D 25 HYDROXY: CPT

## 2021-09-14 PROCEDURE — 80053 COMPREHEN METABOLIC PANEL: CPT

## 2021-09-14 PROCEDURE — 83036 HEMOGLOBIN GLYCOSYLATED A1C: CPT

## 2021-09-14 PROCEDURE — 84443 ASSAY THYROID STIM HORMONE: CPT

## 2021-09-14 PROCEDURE — 80061 LIPID PANEL: CPT

## 2021-09-14 PROCEDURE — 87086 URINE CULTURE/COLONY COUNT: CPT | Performed by: INTERNAL MEDICINE

## 2021-09-14 PROCEDURE — 81001 URINALYSIS AUTO W/SCOPE: CPT | Performed by: INTERNAL MEDICINE

## 2021-09-14 RX ORDER — B-COMPLEX WITH VITAMIN C
TABLET ORAL
Qty: 90 TABLET | Refills: 3 | Status: SHIPPED | OUTPATIENT
Start: 2021-09-14

## 2021-09-14 RX ORDER — GLIMEPIRIDE 2 MG/1
TABLET ORAL
Qty: 180 TABLET | Refills: 2 | OUTPATIENT
Start: 2021-09-14

## 2021-09-14 RX ORDER — LISINOPRIL AND HYDROCHLOROTHIAZIDE 25; 20 MG/1; MG/1
1 TABLET ORAL 2 TIMES DAILY
Qty: 180 TABLET | Refills: 3 | Status: SHIPPED | OUTPATIENT
Start: 2021-09-14 | End: 2022-09-14

## 2021-09-15 RX ORDER — GLIMEPIRIDE 2 MG/1
TABLET ORAL
Qty: 180 TABLET | Refills: 2 | OUTPATIENT
Start: 2021-09-15

## 2021-09-16 NOTE — TELEPHONE ENCOUNTER
Current refill request refused due to refill is either a duplicate request or has active refills at the pharmacy. Check previous templates.     Requested Prescriptions     Refused Prescriptions Disp Refills   • GLIMEPIRIDE 2 MG Oral Tab [Pharmacy Med Name:

## 2021-09-17 ENCOUNTER — TELEPHONE (OUTPATIENT)
Dept: INTERNAL MEDICINE CLINIC | Facility: CLINIC | Age: 86
End: 2021-09-17

## 2021-09-17 ENCOUNTER — PATIENT MESSAGE (OUTPATIENT)
Dept: INTERNAL MEDICINE CLINIC | Facility: CLINIC | Age: 86
End: 2021-09-17

## 2021-09-17 NOTE — TELEPHONE ENCOUNTER
Medication Detail    Medication Quantity Refills Start End   glimepiride 2 MG Oral Tab 90 tablet 3 4/1/2021 4/1/2022   Sig:   Take 1 tablet (2 mg total) by mouth daily with breakfast.       We last filled RX as 2 mg daily (1 yr in April).  Current request i

## 2021-09-17 NOTE — TELEPHONE ENCOUNTER
From: Zana Davila  To: Honorio Lawson MD  Sent: 9/17/2021 10:05 AM CDT  Subject: Glimepiride    Good morning. Just wanted to check in on my mom’s-Elvie Stollarz-prescription renewal for glimepiride.  When I checked her my chart it had been denied

## 2021-09-17 NOTE — TELEPHONE ENCOUNTER
I attempted to call patient regarding her recent blood test results. Please call patient and notify her that her recent lab tests have turned out well. I will forward this to nursing.   Thank you!!

## 2021-09-17 NOTE — TELEPHONE ENCOUNTER
1 year sent to [facility] on 4/1/21 (1 tablet daily). Request received from [facility] on 9/15 was from old rx for 2 tabs daily (was stopped by alternate MD and restarted at 1 tab daily by Dr. Esdras Serrato on 4/1/21).  Mychart to pt to advise and for clarification

## 2021-09-17 NOTE — TELEPHONE ENCOUNTER
From: Padmini Ward  Sent: 9/17/2021 1:20 PM CDT  To: [facility] Clinical Staff  Subject: Glimepiride    She does only take it once a day. I have called [facility] and hoping they can figure it out. Thank you for your help!      Fernanda Lynne

## 2021-09-19 RX ORDER — GLIMEPIRIDE 2 MG/1
TABLET ORAL
Qty: 180 TABLET | Refills: 2 | OUTPATIENT
Start: 2021-09-19

## 2021-09-19 NOTE — TELEPHONE ENCOUNTER
Please see alternate Mychart. Pt has active refill at [facility] from 4/2021 and pt takes 1x per day per MD and verified from dtr. Current refill request refused due to refill is either a duplicate request or has active refills at the pharmacy.   Check prev

## 2021-09-22 ENCOUNTER — PATIENT MESSAGE (OUTPATIENT)
Dept: INTERNAL MEDICINE CLINIC | Facility: CLINIC | Age: 86
End: 2021-09-22

## 2021-09-22 RX ORDER — GLIMEPIRIDE 2 MG/1
2 TABLET ORAL
Qty: 90 TABLET | Refills: 3 | Status: SHIPPED | OUTPATIENT
Start: 2021-09-22 | End: 2022-09-22

## 2021-09-22 NOTE — TELEPHONE ENCOUNTER
Dr. Shawn Kwan, please review the pended refill of glimepiride 2 mg tablets. Please review the allergy warning. Patient's daughter Geraldine Pollock has been sending [employer] messages trying to get this refilled today. I spoke with Geraldine Sarmad Pollock.  Explained that th

## 2021-09-23 NOTE — TELEPHONE ENCOUNTER
Noted. Patient has been taking glimepiride 2 mg orally daily for some time. She has had no trouble with this medication. I have refilled the medication as requested for 90 tablets with 3 refills.

## 2021-09-23 NOTE — TELEPHONE ENCOUNTER
RX was sent in yesterday to [facility] at 1905. This Mychart was received at 0900.  Already addressed

## 2021-09-23 NOTE — TELEPHONE ENCOUNTER
From: Hernandez Barrett  Sent: 9/22/2021 9:38 AM CDT  To: [facility] Clinical Staff  Subject: refill request    I just spoke to [facility] [address] again and they do not have a prescription from April for a year for my mom Rony Aguirre for Glimepiride.    We

## 2021-11-02 ENCOUNTER — HOSPITAL ENCOUNTER (OUTPATIENT)
Dept: BONE DENSITY | Facility: HOSPITAL | Age: 86
Discharge: HOME | End: 2021-11-02
Attending: INTERNAL MEDICINE
Payer: MEDICARE

## 2021-11-02 DIAGNOSIS — M81.8 OTHER OSTEOPOROSIS WITHOUT CURRENT PATHOLOGICAL FRACTURE: ICD-10-CM

## 2021-11-02 PROCEDURE — 77080 DXA BONE DENSITY AXIAL: CPT | Performed by: INTERNAL MEDICINE

## 2021-11-03 ENCOUNTER — TELEPHONE (OUTPATIENT)
Dept: ENDOCRINOLOGY CLINIC | Facility: CLINIC | Age: 86
End: 2021-11-03

## 2021-11-03 NOTE — TELEPHONE ENCOUNTER
Please call patient - her DEXA scan does demonstrate significant osteoporosis particularly in her hips. We had discussed the possibility of prolia therapy, what does she think about this option? Thanks.

## 2021-11-03 NOTE — TELEPHONE ENCOUNTER
rn called patient with message below by dr Baldomero Story, patient verbalized understanding but doesn't remember discussing treatment options , asked if nurse can call Chidi Christopher daughter, and give her message.    Rn called Hiram Fee gave her message and reviewed side effect

## 2021-11-09 NOTE — TELEPHONE ENCOUNTER
SOB received. No PA needed. Estimated OOP cost 20%. Called to schedule RN visit. Spoke with daughter, no appointment was made. States she will discuss with her brother and then call back with their decision. Routed AVTAR.

## 2021-11-10 NOTE — TELEPHONE ENCOUNTER
I would recommend Actonel which can be taken either monthly or weekly. The biggest side effect of the medication is possibility of mild esophagitis or worsening of heart burn symptoms.   This certainly doesn't occur in every patient but important to take m

## 2021-11-10 NOTE — TELEPHONE ENCOUNTER
Spoke to patient's daughter regarding Dr. Sarah Beth Lr note below. Patient's daughter verbalized understanding, said she has to speak with patient's son about treatment option and will give us a call back.

## 2021-11-10 NOTE — TELEPHONE ENCOUNTER
Dr. Kyra Hendrix to patient's daughter, Gregoria Vasquez to relay message below - she would like more information on oral tablet option - she stated patient does not like shots and twice a year of Prolia is a lot  Patient's daughter states biggest concern is SE for

## 2021-11-10 NOTE — TELEPHONE ENCOUNTER
Ok, noted. If patient decides not to proceed with prolia I do think we should consider at least the oral tablets to help prevent fractures. Thanks.

## 2021-11-11 ENCOUNTER — PATIENT MESSAGE (OUTPATIENT)
Dept: INTERNAL MEDICINE CLINIC | Facility: CLINIC | Age: 86
End: 2021-11-11

## 2021-11-12 ENCOUNTER — TELEPHONE (OUTPATIENT)
Dept: INTERNAL MEDICINE CLINIC | Facility: CLINIC | Age: 86
End: 2021-11-12

## 2021-11-12 NOTE — TELEPHONE ENCOUNTER
----- Message from Robe Foster. Keerthi Ayon sent at 11/11/2021  5:56 PM CST -----  Regarding: Actdarcy  Can Dr. Disha Ballesteros call me when he has a free minute. Dr. Osmar Aldridge did a bone scan and wants to put my mom on this medicine.  I am concerned about the side effects

## 2021-11-12 NOTE — TELEPHONE ENCOUNTER
From: Eleuterio Expose  To: Fareed Morrow MD  Sent: 11/11/2021 5:56 PM CST  Subject: Actonel    Can Dr. Juan Carlos Vincent call me when he has a free minute. Dr. Mary Anne Jimenez did a bone scan and wants to put my mom on this medicine.  I am concerned about the side eff

## 2021-11-15 ENCOUNTER — PATIENT MESSAGE (OUTPATIENT)
Dept: INTERNAL MEDICINE CLINIC | Facility: CLINIC | Age: 86
End: 2021-11-15

## 2021-11-15 NOTE — TELEPHONE ENCOUNTER
Dr. Vinh Crowell, please advise. Patient's family sent another message regarding the medication. Thank you.

## 2021-11-16 NOTE — TELEPHONE ENCOUNTER
To Dr MERCADO---    Received MYchart with the following: \"Tried to answer when Dr. Disha Ballesteros called but it did not connect for some reason.  \"

## 2021-11-16 NOTE — TELEPHONE ENCOUNTER
Telephone call to patient's daughter and situation discussed. Dr. Aristides Blank contacted patient and daughter about going on Prolia. Patient and daughter were reluctant to do this. Dr. Aristides Blank has recommended taking Actonel for patient's osteoporosis.   I have to

## 2021-11-16 NOTE — TELEPHONE ENCOUNTER
From: Annie Nguyen  Sent: 11/15/2021 7:06 PM CST  To: [facility] Clinical Staff  Subject: Vikram Melo to answer when Dr. Wing Lagos called but it did not connect for some reason.

## 2021-11-18 ENCOUNTER — TELEPHONE (OUTPATIENT)
Dept: ENDOCRINOLOGY CLINIC | Facility: CLINIC | Age: 86
End: 2021-11-18

## 2021-11-18 NOTE — TELEPHONE ENCOUNTER
Patients daughter calling to provide update for rx actonel, indicates if Dr. Pedrito Bautista wants to proceed with sending script to [facility]. Please call at [phone].

## 2021-11-19 RX ORDER — RISEDRONATE SODIUM 35 MG/1
35 TABLET, FILM COATED ORAL
Qty: 12 TABLET | Refills: 3 | Status: SHIPPED | OUTPATIENT
Start: 2021-11-19

## 2021-11-19 NOTE — TELEPHONE ENCOUNTER
rn called [facility] to inform about prescripition, reviewed instructions on how to take first thing in the morning 30 min before eating and stay up do not lay down. Verbalized understanding.    rx sent to [facility]

## 2021-11-29 RX ORDER — DILTIAZEM HYDROCHLORIDE 240 MG/1
CAPSULE, COATED, EXTENDED RELEASE ORAL
Qty: 90 CAPSULE | Refills: 3 | Status: SHIPPED | OUTPATIENT
Start: 2021-11-29

## 2022-01-01 NOTE — TELEPHONE ENCOUNTER
To: EM [facility] CLINICAL STAFF      From: Jaida Brumfield      Created: 11/13/2020 1:50 PM        My mom Esa Messina is nauseous every day about an hour or so after she takes her lunch medication. Could the potassium she’s taking be causing this?  Could DISPLAY PLAN FREE TEXT

## 2022-01-31 RX ORDER — PRAVASTATIN SODIUM 40 MG
40 TABLET ORAL NIGHTLY
Qty: 90 TABLET | Refills: 3 | Status: SHIPPED | OUTPATIENT
Start: 2022-01-31

## 2022-02-10 ENCOUNTER — OFFICE VISIT (OUTPATIENT)
Dept: ENDOCRINOLOGY CLINIC | Facility: CLINIC | Age: 87
End: 2022-02-10
Payer: MEDICARE

## 2022-02-10 VITALS
WEIGHT: 135 LBS | SYSTOLIC BLOOD PRESSURE: 160 MMHG | DIASTOLIC BLOOD PRESSURE: 77 MMHG | HEART RATE: 64 BPM | BODY MASS INDEX: 25 KG/M2

## 2022-02-10 DIAGNOSIS — E11.65 UNCONTROLLED TYPE 2 DIABETES MELLITUS WITH HYPERGLYCEMIA (HCC): Primary | ICD-10-CM

## 2022-02-10 DIAGNOSIS — M81.8 OTHER OSTEOPOROSIS WITHOUT CURRENT PATHOLOGICAL FRACTURE: ICD-10-CM

## 2022-02-10 DIAGNOSIS — E55.9 VITAMIN D DEFICIENCY: ICD-10-CM

## 2022-02-10 LAB
CARTRIDGE LOT#: ABNORMAL NUMERIC
GLUCOSE BLOOD: 239
HEMOGLOBIN A1C: 7.2 % (ref 4.3–5.6)
TEST STRIP LOT #: NORMAL NUMERIC

## 2022-02-10 PROCEDURE — 83036 HEMOGLOBIN GLYCOSYLATED A1C: CPT | Performed by: INTERNAL MEDICINE

## 2022-02-10 PROCEDURE — 36416 COLLJ CAPILLARY BLOOD SPEC: CPT | Performed by: INTERNAL MEDICINE

## 2022-02-10 PROCEDURE — 99214 OFFICE O/P EST MOD 30 MIN: CPT | Performed by: INTERNAL MEDICINE

## 2022-02-10 PROCEDURE — 82947 ASSAY GLUCOSE BLOOD QUANT: CPT | Performed by: INTERNAL MEDICINE

## 2022-02-21 RX ORDER — LEVOTHYROXINE SODIUM 0.07 MG/1
TABLET ORAL
Qty: 90 TABLET | Refills: 3 | Status: SHIPPED | OUTPATIENT
Start: 2022-02-21

## 2022-04-12 RX ORDER — AMLODIPINE BESYLATE 5 MG/1
5 TABLET ORAL DAILY
Qty: 90 TABLET | Refills: 1 | Status: SHIPPED | OUTPATIENT
Start: 2022-04-12

## 2022-05-26 ENCOUNTER — APPOINTMENT (OUTPATIENT)
Dept: ULTRASOUND IMAGING | Facility: HOSPITAL | Age: 87
End: 2022-05-26
Attending: HOSPITALIST
Payer: MEDICARE

## 2022-05-26 ENCOUNTER — APPOINTMENT (OUTPATIENT)
Dept: CT IMAGING | Facility: HOSPITAL | Age: 87
End: 2022-05-26
Attending: EMERGENCY MEDICINE
Payer: MEDICARE

## 2022-05-26 ENCOUNTER — APPOINTMENT (OUTPATIENT)
Dept: CV DIAGNOSTICS | Facility: HOSPITAL | Age: 87
End: 2022-05-26
Attending: HOSPITALIST
Payer: MEDICARE

## 2022-05-26 ENCOUNTER — HOSPITAL ENCOUNTER (OUTPATIENT)
Facility: HOSPITAL | Age: 87
Setting detail: OBSERVATION
Discharge: HOME | End: 2022-05-28
Attending: EMERGENCY MEDICINE | Admitting: HOSPITALIST
Payer: MEDICARE

## 2022-05-26 DIAGNOSIS — R55 SYNCOPE AND COLLAPSE: Primary | ICD-10-CM

## 2022-05-26 LAB
ANION GAP SERPL CALC-SCNC: 6 MMOL/L (ref 0–18)
BASOPHILS # BLD AUTO: 0.05 X10(3) UL (ref 0–0.2)
BASOPHILS NFR BLD AUTO: 0.9 %
BILIRUB UR QL: NEGATIVE
BUN BLD-MCNC: 39 MG/DL (ref 7–18)
BUN/CREAT SERPL: 30.7 (ref 10–20)
CALCIUM BLD-MCNC: 9.8 MG/DL (ref 8.5–10.1)
CHLORIDE SERPL-SCNC: 109 MMOL/L (ref 98–112)
CLARITY UR: CLEAR
CO2 SERPL-SCNC: 29 MMOL/L (ref 21–32)
COLOR UR: YELLOW
CREAT BLD-MCNC: 1.27 MG/DL
DEPRECATED RDW RBC AUTO: 43.2 FL (ref 35.1–46.3)
EOSINOPHIL # BLD AUTO: 0.31 X10(3) UL (ref 0–0.7)
EOSINOPHIL NFR BLD AUTO: 5.4 %
ERYTHROCYTE [DISTWIDTH] IN BLOOD BY AUTOMATED COUNT: 11.9 % (ref 11–15)
EST. AVERAGE GLUCOSE BLD GHB EST-MCNC: 171 MG/DL (ref 68–126)
GLUCOSE BLD-MCNC: 200 MG/DL (ref 70–99)
GLUCOSE BLDC GLUCOMTR-MCNC: 132 MG/DL (ref 70–99)
GLUCOSE BLDC GLUCOMTR-MCNC: 143 MG/DL (ref 70–99)
GLUCOSE BLDC GLUCOMTR-MCNC: 164 MG/DL (ref 70–99)
GLUCOSE BLDC GLUCOMTR-MCNC: 199 MG/DL (ref 70–99)
GLUCOSE BLDC GLUCOMTR-MCNC: 77 MG/DL (ref 70–99)
GLUCOSE UR-MCNC: >=500 MG/DL
HBA1C MFR BLD: 7.6 % (ref ?–5.7)
HCT VFR BLD AUTO: 40 %
HGB BLD-MCNC: 12.5 G/DL
HGB UR QL STRIP.AUTO: NEGATIVE
IMM GRANULOCYTES # BLD AUTO: 0.01 X10(3) UL (ref 0–1)
IMM GRANULOCYTES NFR BLD: 0.2 %
KETONES UR-MCNC: NEGATIVE MG/DL
LEUKOCYTE ESTERASE UR QL STRIP.AUTO: NEGATIVE
LYMPHOCYTES # BLD AUTO: 1.07 X10(3) UL (ref 1–4)
LYMPHOCYTES NFR BLD AUTO: 18.6 %
MCH RBC QN AUTO: 30.6 PG (ref 26–34)
MCHC RBC AUTO-ENTMCNC: 31.3 G/DL (ref 31–37)
MCV RBC AUTO: 97.8 FL
MONOCYTES # BLD AUTO: 0.66 X10(3) UL (ref 0.1–1)
MONOCYTES NFR BLD AUTO: 11.5 %
NEUTROPHILS # BLD AUTO: 3.64 X10 (3) UL (ref 1.5–7.7)
NEUTROPHILS # BLD AUTO: 3.64 X10(3) UL (ref 1.5–7.7)
NEUTROPHILS NFR BLD AUTO: 63.4 %
NITRITE UR QL STRIP.AUTO: NEGATIVE
OSMOLALITY SERPL CALC.SUM OF ELEC: 313 MOSM/KG (ref 275–295)
PH UR: 7 [PH] (ref 5–8)
PLATELET # BLD AUTO: 130 10(3)UL (ref 150–450)
POTASSIUM SERPL-SCNC: 3.3 MMOL/L (ref 3.5–5.1)
PROT UR-MCNC: NEGATIVE MG/DL
RBC # BLD AUTO: 4.09 X10(6)UL
SARS-COV-2 RNA RESP QL NAA+PROBE: DETECTED
SODIUM SERPL-SCNC: 144 MMOL/L (ref 136–145)
SP GR UR STRIP: 1.02 (ref 1–1.03)
TROPONIN I HIGH SENSITIVITY: 16 NG/L
UROBILINOGEN UR STRIP-ACNC: <2
VIT C UR-MCNC: 40 MG/DL
WBC # BLD AUTO: 5.7 X10(3) UL (ref 4–11)

## 2022-05-26 PROCEDURE — 93880 EXTRACRANIAL BILAT STUDY: CPT | Performed by: HOSPITALIST

## 2022-05-26 PROCEDURE — 99220 INITIAL OBSERVATION CARE,LEVL III: CPT | Performed by: HOSPITALIST

## 2022-05-26 PROCEDURE — 70450 CT HEAD/BRAIN W/O DYE: CPT | Performed by: EMERGENCY MEDICINE

## 2022-05-26 PROCEDURE — 93306 TTE W/DOPPLER COMPLETE: CPT | Performed by: HOSPITALIST

## 2022-05-26 RX ORDER — DOCUSATE SODIUM 250 MG
250 CAPSULE ORAL DAILY
COMMUNITY

## 2022-05-26 RX ORDER — HEPARIN SODIUM 5000 [USP'U]/ML
5000 INJECTION, SOLUTION INTRAVENOUS; SUBCUTANEOUS EVERY 12 HOURS SCHEDULED
Status: DISCONTINUED | OUTPATIENT
Start: 2022-05-26 | End: 2022-05-28

## 2022-05-26 RX ORDER — ONDANSETRON 2 MG/ML
4 INJECTION INTRAMUSCULAR; INTRAVENOUS EVERY 6 HOURS PRN
Status: DISCONTINUED | OUTPATIENT
Start: 2022-05-26 | End: 2022-05-28

## 2022-05-26 RX ORDER — DILTIAZEM HYDROCHLORIDE 240 MG/1
240 CAPSULE, COATED, EXTENDED RELEASE ORAL DAILY
Status: DISCONTINUED | OUTPATIENT
Start: 2022-05-27 | End: 2022-05-28

## 2022-05-26 RX ORDER — PRAVASTATIN SODIUM 20 MG
20 TABLET ORAL NIGHTLY
Refills: 3 | Status: DISCONTINUED | OUTPATIENT
Start: 2022-05-26 | End: 2022-05-28

## 2022-05-26 RX ORDER — ACETAMINOPHEN 325 MG/1
650 TABLET ORAL EVERY 6 HOURS PRN
Status: DISCONTINUED | OUTPATIENT
Start: 2022-05-26 | End: 2022-05-28

## 2022-05-26 RX ORDER — CETIRIZINE HYDROCHLORIDE 10 MG/1
10 TABLET ORAL DAILY
COMMUNITY

## 2022-05-26 RX ORDER — POTASSIUM CHLORIDE 20 MEQ/1
40 TABLET, EXTENDED RELEASE ORAL ONCE
Status: COMPLETED | OUTPATIENT
Start: 2022-05-26 | End: 2022-05-26

## 2022-05-26 RX ORDER — ASPIRIN 81 MG/1
81 TABLET, CHEWABLE ORAL DAILY
Status: DISCONTINUED | OUTPATIENT
Start: 2022-05-26 | End: 2022-05-28

## 2022-05-26 RX ORDER — SODIUM CHLORIDE 9 MG/ML
INJECTION, SOLUTION INTRAVENOUS CONTINUOUS
Status: DISCONTINUED | OUTPATIENT
Start: 2022-05-26 | End: 2022-05-28

## 2022-05-26 RX ORDER — DEXTROSE MONOHYDRATE 25 G/50ML
50 INJECTION, SOLUTION INTRAVENOUS
Status: DISCONTINUED | OUTPATIENT
Start: 2022-05-26 | End: 2022-05-28

## 2022-05-26 RX ORDER — LEVOTHYROXINE SODIUM 0.07 MG/1
75 TABLET ORAL
Status: DISCONTINUED | OUTPATIENT
Start: 2022-05-27 | End: 2022-05-28

## 2022-05-26 RX ORDER — NICOTINE POLACRILEX 4 MG
30 LOZENGE BUCCAL
Status: DISCONTINUED | OUTPATIENT
Start: 2022-05-26 | End: 2022-05-28

## 2022-05-26 RX ORDER — METOCLOPRAMIDE HYDROCHLORIDE 5 MG/ML
5 INJECTION INTRAMUSCULAR; INTRAVENOUS EVERY 8 HOURS PRN
Status: DISCONTINUED | OUTPATIENT
Start: 2022-05-26 | End: 2022-05-28

## 2022-05-26 RX ORDER — NICOTINE POLACRILEX 4 MG
15 LOZENGE BUCCAL
Status: DISCONTINUED | OUTPATIENT
Start: 2022-05-26 | End: 2022-05-28

## 2022-05-26 RX ORDER — LISINOPRIL AND HYDROCHLOROTHIAZIDE 25; 20 MG/1; MG/1
1 TABLET ORAL 2 TIMES DAILY
Status: DISCONTINUED | OUTPATIENT
Start: 2022-05-27 | End: 2022-05-26

## 2022-05-26 NOTE — ED INITIAL ASSESSMENT (HPI)
Pt arrive in ER per [place] [facility] with c/o syncopal episode while talking to her daughter on the phone, pt also c/o nausea, received 4 mg zofran IV in route, normally aox3 but aox2 upon arrival

## 2022-05-26 NOTE — H&P
[facility]    PATIENT'S NAME: Radha Ace   ATTENDING PHYSICIAN: Evan Monge MD   PATIENT ACCOUNT#:   [id number]    LOCATION:  [address]  MEDICAL RECORD #:   [id number]       YOB: 1930  ADMISSION DATE:       05/26/2022    HISTORY AND PHYSICAL EXAMINATION    (Corrected report, 05/26/2022, addendum added)    CHIEF COMPLAINT:  Syncope. HISTORY OF PRESENT ILLNESS:  The patient is a 80-year-old  female who lives in an independent living facility with her  who was on a FaceTime video call with her daughter today when she started complaining about feeling tired, and then she had witnessed syncope through the video call by her daughter. The patient had no seizure activity. Ambulance was called and brought into the emergency department for evaluation. Chemistry showed potassium of 3.3. GFR was 37, which is slightly below her baseline. Calculated osmolality of 313. Glucose 200. Urinalysis and CBC were unremarkable. CT scan of the brain unremarkable. EKG showed normal sinus rhythm. PAST MEDICAL HISTORY:  Coronary artery disease, status post coronary artery bypass graft surgery 2016. Hypertension, hyperlipidemia, diabetes mellitus type 2, chronic kidney disease stage 3, generalized osteoarthritis, osteoporosis, and hypothyroidism. 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 MEDICATIONS:  Please see medication reconciliation list.  Patient is noted to be on 3 different blood pressure medications, including combination lisinopril/hydrochlorothiazide, Cardizem, and amlodipine. ALLERGIES:  List includes Amaryl, beta blockers, metformin, Naprosyn, adhesive tapes. FAMILY HISTORY:  Positive for hypertension and heart disease. SOCIAL HISTORY:  No tobacco, alcohol, or drug use.   Lives with her . At baseline independent for basic activities of daily living. REVIEW OF SYSTEMS:  The patient, per the grandson at the bedside, seems more foggy than usual.  The patient is a poor historian, but she reported to me that she has been feeling lightheaded and dizzy, especially when she stands up recently. No chest pain. No seizure activity. She felt nauseated earlier today, and she reported that she threw up once after she had the syncopal episode. Other 12-point review of systems is unobtainable. PHYSICAL EXAMINATION:    GENERAL:  Alert and oriented to time, place, and person. Pleasant. Able to answer questions but slightly foggy and slow with her responses which was reported by her grandson as below her baseline. VITAL SIGNS:  Temperature 98.4, pulse 84, respiratory rate 19, blood pressure upon arrival 93/70, pulse ox 97% on room air. HEENT:  Atraumatic. Oropharynx clear. Dry mucous membranes. Normal hard and soft palate. Eyes:  Anicteric sclerae. NECK:  Supple. No lymphadenopathy. Trachea midline. Full range of motion. LUNGS:  Clear to auscultation bilaterally. Normal respiratory effort. HEART:  Regular rate and rhythm. S1 and S2 auscultated. No murmur. ABDOMEN:  Soft, nondistended. No tenderness. Positive bowel sounds. EXTREMITIES:  No peripheral edema, clubbing, or cyanosis. NEUROLOGIC:  No focal defects. Motor and sensory intact. ASSESSMENT:    1. Syncope, most likely orthostatic hypotension versus vasovagal reaction. Rule out cardiac arrhythmia or other vascular abnormalities. 2.   Chronic kidney disease stage 3, with mild dehydration; acute on chronic component with hyperosmolar state. 3.   Diabetes mellitus type 2.  4.   History of coronary artery disease. PLAN:  The patient will be admitted to telemetry floor. Will start her on gentle hydration. Hold her amlodipine and monitor her blood pressure.   Obtain 2D echocardiogram with Doppler and carotid ultrasound. Fall precautions. Further recommendations to follow. ADDENDUM (Job [id number]):    ASSESSMENT AND PLAN:  Patient tested positive for COVID-19. She is not exhibiting any respiratory infectious symptoms. No hypoxemia.   Not clearly sure how much this is contributing to her clinical presentation      Dictated By Alexis Sears MD  d: 05/26/2022 11:39:01  t: 05/26/2022 11:53:48  Job [id number]  FB/

## 2022-05-26 NOTE — ED QUICK NOTES
Orders for admission, patient is aware of plan and ready to go upstairs.  Any questions, please call ED RN Janette Chacon at extension [phone]    Patient Covid vaccination status: Fully vaccinated     COVID Test Ordered in ED: Rapid SARS-CoV-2 by PCR    COVID Suspicion at Admission: N/A    Running Infusions:  None    Mental Status/LOC at time of transport: aox2    Other pertinent information: Pt from [facility] independent living, on room air, uses a walker to move around, independent with care  CIWA score: N/A   NIH score:  N/A

## 2022-05-27 LAB
ANION GAP SERPL CALC-SCNC: 6 MMOL/L (ref 0–18)
BASOPHILS # BLD AUTO: 0.03 X10(3) UL (ref 0–0.2)
BASOPHILS NFR BLD AUTO: 0.7 %
BUN BLD-MCNC: 30 MG/DL (ref 7–18)
BUN/CREAT SERPL: 27.8 (ref 10–20)
CALCIUM BLD-MCNC: 9.4 MG/DL (ref 8.5–10.1)
CHLORIDE SERPL-SCNC: 112 MMOL/L (ref 98–112)
CO2 SERPL-SCNC: 28 MMOL/L (ref 21–32)
CREAT BLD-MCNC: 1.08 MG/DL
DEPRECATED RDW RBC AUTO: 44.6 FL (ref 35.1–46.3)
EOSINOPHIL # BLD AUTO: 0.27 X10(3) UL (ref 0–0.7)
EOSINOPHIL NFR BLD AUTO: 6 %
ERYTHROCYTE [DISTWIDTH] IN BLOOD BY AUTOMATED COUNT: 11.9 % (ref 11–15)
GLUCOSE BLD-MCNC: 68 MG/DL (ref 70–99)
GLUCOSE BLDC GLUCOMTR-MCNC: 104 MG/DL (ref 70–99)
GLUCOSE BLDC GLUCOMTR-MCNC: 136 MG/DL (ref 70–99)
GLUCOSE BLDC GLUCOMTR-MCNC: 148 MG/DL (ref 70–99)
GLUCOSE BLDC GLUCOMTR-MCNC: 159 MG/DL (ref 70–99)
GLUCOSE BLDC GLUCOMTR-MCNC: 86 MG/DL (ref 70–99)
HCT VFR BLD AUTO: 40.6 %
HGB BLD-MCNC: 12.5 G/DL
IMM GRANULOCYTES # BLD AUTO: 0.01 X10(3) UL (ref 0–1)
IMM GRANULOCYTES NFR BLD: 0.2 %
LYMPHOCYTES # BLD AUTO: 0.86 X10(3) UL (ref 1–4)
LYMPHOCYTES NFR BLD AUTO: 19.2 %
MCH RBC QN AUTO: 31.3 PG (ref 26–34)
MCHC RBC AUTO-ENTMCNC: 30.8 G/DL (ref 31–37)
MCV RBC AUTO: 101.5 FL
MONOCYTES # BLD AUTO: 0.57 X10(3) UL (ref 0.1–1)
MONOCYTES NFR BLD AUTO: 12.7 %
NEUTROPHILS # BLD AUTO: 2.74 X10 (3) UL (ref 1.5–7.7)
NEUTROPHILS # BLD AUTO: 2.74 X10(3) UL (ref 1.5–7.7)
NEUTROPHILS NFR BLD AUTO: 61.2 %
OSMOLALITY SERPL CALC.SUM OF ELEC: 306 MOSM/KG (ref 275–295)
PLATELET # BLD AUTO: 114 10(3)UL (ref 150–450)
POTASSIUM SERPL-SCNC: 3.9 MMOL/L (ref 3.5–5.1)
POTASSIUM SERPL-SCNC: 3.9 MMOL/L (ref 3.5–5.1)
RBC # BLD AUTO: 4 X10(6)UL
SODIUM SERPL-SCNC: 146 MMOL/L (ref 136–145)
WBC # BLD AUTO: 4.5 X10(3) UL (ref 4–11)

## 2022-05-27 PROCEDURE — 99226 SUBSEQUENT OBSERVATION CARE: CPT | Performed by: HOSPITALIST

## 2022-05-27 NOTE — PLAN OF CARE
Problem: Patient Centered Care  Goal: Patient preferences are identified and integrated in the patient's plan of care  Description: Interventions:  - What would you like us to know as we care for you? My family helps care for me. - Provide timely, complete, and accurate information to patient/family  - Incorporate patient and family knowledge, values, beliefs, and cultural backgrounds into the planning and delivery of care  - Encourage patient/family to participate in care and decision-making at the level they choose  - Honor patient and family perspectives and choices  Outcome: Progressing     Problem: Patient/Family Goals  Goal: Patient/Family Long Term Goal  Description: Patient's Long Term Goal: Manage Health    Interventions:  - Educate family/caretakers on medical regimen  - Patient understands how to care for self at home  - Monitor symptoms   - See additional Care Plan goals for specific interventions  Outcome: Progressing  Goal: Patient/Family Short Term Goal  Description: Patient's Short Term Goal: No syncopal episodes    Interventions:   - Monitor vitals  - tele box  - Orthostatics  - IVF  - Echo  - See additional Care Plan goals for specific interventions  Outcome: Progressing     Problem: CARDIOVASCULAR - ADULT  Goal: Maintains optimal cardiac output and hemodynamic stability  Description: INTERVENTIONS:  - Monitor vital signs, rhythm, and trends  - Monitor for bleeding, hypotension and signs of decreased cardiac output  - Evaluate effectiveness of vasoactive medications to optimize hemodynamic stability  - Monitor arterial and/or venous puncture sites for bleeding and/or hematoma  - Assess quality of pulses, skin color and temperature  - Assess for signs of decreased coronary artery perfusion - ex.  Angina  - Evaluate fluid balance, assess for edema, trend weights  Outcome: Progressing  Goal: Absence of cardiac arrhythmias or at baseline  Description: INTERVENTIONS:  - Continuous cardiac monitoring, monitor vital signs, obtain 12 lead EKG if indicated  - Evaluate effectiveness of antiarrhythmic and heart rate control medications as ordered  - Initiate emergency measures for life threatening arrhythmias  - Monitor electrolytes and administer replacement therapy as ordered  Outcome: Progressing     Problem: SAFETY ADULT - FALL  Goal: Free from fall injury  Description: INTERVENTIONS:  - Assess pt frequently for physical needs  - Identify cognitive and physical deficits and behaviors that affect risk of falls.   - [place] fall precautions as indicated by assessment.  - Educate pt/family on patient safety including physical limitations  - Instruct pt to call for assistance with activity based on assessment  - Modify environment to reduce risk of injury  - Provide assistive devices as appropriate  - Consider OT/PT consult to assist with strengthening/mobility  - Encourage toileting schedule  Outcome: Progressing     Problem: DISCHARGE PLANNING  Goal: Discharge to home or other facility with appropriate resources  Description: INTERVENTIONS:  - Identify barriers to discharge w/pt and caregiver  - Include patient/family/discharge partner in discharge planning  - Arrange for needed discharge resources and transportation as appropriate  - Identify discharge learning needs (meds, wound care, etc)  - Arrange for interpreters to assist at discharge as needed  - Consider post-discharge preferences of patient/family/discharge partner  - Complete POLST form as appropriate  - Assess patient's ability to be responsible for managing their own health  - Refer to Case Management Department for coordinating discharge planning if the patient needs post-hospital services based on physician/LIP order or complex needs related to functional status, cognitive ability or social support system  Outcome: Progressing

## 2022-05-27 NOTE — CM/SW NOTE
SW spoke w. Pt's daughter by phone. Pt and daughter prefer that pt return to her home at [facility] independent living and remain home during her isolation period. STANLEY confirmed with Anitra Ballard at [facility]. Living; pt can return when medically cleared. PP can assist w/ delivering meals, mail ect. Daughter uses video monitoring to check in daily and do medication reminders. Daughter feels confident in pt's ability to return home w/ the support in place. Pt is independent w/ all adls. When medically cleared call Anitra at [facility] to inform of dc date: [phone]    Plan: Return to [place] [address] w/family supports, daughter to transport home. SW to continue following and evaluate discharge needs.      Isi Bourne, [address] [place] [address]

## 2022-05-27 NOTE — PLAN OF CARE
Problem: Patient Centered Care  Goal: Patient preferences are identified and integrated in the patient's plan of care  Description: Interventions:  - What would you like us to know as we care for you? My family helps care for me. - Provide timely, complete, and accurate information to patient/family  - Incorporate patient and family knowledge, values, beliefs, and cultural backgrounds into the planning and delivery of care  - Encourage patient/family to participate in care and decision-making at the level they choose  - Honor patient and family perspectives and choices  Outcome: Progressing     Problem: Patient/Family Goals  Goal: Patient/Family Long Term Goal  Description: Patient's Long Term Goal: Manage Health    Interventions:  - Educate family/caretakers on medical regimen  - Patient understands how to care for self at home  - Monitor symptoms   - See additional Care Plan goals for specific interventions  Outcome: Progressing  Goal: Patient/Family Short Term Goal  Description: Patient's Short Term Goal: No syncopal episodes    Interventions:   - Monitor vitals  - tele box  - Orthostatics  - IVF  - Echo  - See additional Care Plan goals for specific interventions  Outcome: Progressing     Problem: CARDIOVASCULAR - ADULT  Goal: Maintains optimal cardiac output and hemodynamic stability  Description: INTERVENTIONS:  - Monitor vital signs, rhythm, and trends  - Monitor for bleeding, hypotension and signs of decreased cardiac output  - Evaluate effectiveness of vasoactive medications to optimize hemodynamic stability  - Monitor arterial and/or venous puncture sites for bleeding and/or hematoma  - Assess quality of pulses, skin color and temperature  - Assess for signs of decreased coronary artery perfusion - ex.  Angina  - Evaluate fluid balance, assess for edema, trend weights  Outcome: Progressing  Goal: Absence of cardiac arrhythmias or at baseline  Description: INTERVENTIONS:  - Continuous cardiac monitoring, monitor vital signs, obtain 12 lead EKG if indicated  - Evaluate effectiveness of antiarrhythmic and heart rate control medications as ordered  - Initiate emergency measures for life threatening arrhythmias  - Monitor electrolytes and administer replacement therapy as ordered  Outcome: Progressing     Problem: SAFETY ADULT - FALL  Goal: Free from fall injury  Description: INTERVENTIONS:  - Assess pt frequently for physical needs  - Identify cognitive and physical deficits and behaviors that affect risk of falls.   - [place] fall precautions as indicated by assessment.  - Educate pt/family on patient safety including physical limitations  - Instruct pt to call for assistance with activity based on assessment  - Modify environment to reduce risk of injury  - Provide assistive devices as appropriate  - Consider OT/PT consult to assist with strengthening/mobility  - Encourage toileting schedule  Outcome: Progressing     Problem: DISCHARGE PLANNING  Goal: Discharge to home or other facility with appropriate resources  Description: INTERVENTIONS:  - Identify barriers to discharge w/pt and caregiver  - Include patient/family/discharge partner in discharge planning  - Arrange for needed discharge resources and transportation as appropriate  - Identify discharge learning needs (meds, wound care, etc)  - Arrange for interpreters to assist at discharge as needed  - Consider post-discharge preferences of patient/family/discharge partner  - Complete POLST form as appropriate  - Assess patient's ability to be responsible for managing their own health  - Refer to Case Management Department for coordinating discharge planning if the patient needs post-hospital services based on physician/LIP order or complex needs related to functional status, cognitive ability or social support system  Outcome: Progressing

## 2022-05-27 NOTE — PLAN OF CARE
Patient alert x4, pleasantly confused throughout the night. No complaints. IVF running. Educated patient to call for assistance, call light within reach. Problem: Patient Centered Care  Goal: Patient preferences are identified and integrated in the patient's plan of care  Description: Interventions:  - What would you like us to know as we care for you? My family helps care for me. - Provide timely, complete, and accurate information to patient/family  - Incorporate patient and family knowledge, values, beliefs, and cultural backgrounds into the planning and delivery of care  - Encourage patient/family to participate in care and decision-making at the level they choose  - Honor patient and family perspectives and choices  Outcome: Progressing     Problem: Patient/Family Goals  Goal: Patient/Family Long Term Goal  Description: Patient's Long Term Goal: Manage Health    Interventions:  - Educate family/caretakers on medical regimen  - Patient understands how to care for self at home  - Monitor symptoms   - See additional Care Plan goals for specific interventions  Outcome: Progressing  Goal: Patient/Family Short Term Goal  Description: Patient's Short Term Goal: No syncopal episodes    Interventions:   - Monitor vitals  - tele box  - Orthostatics  - IVF  - Echo  - See additional Care Plan goals for specific interventions  Outcome: Progressing     Problem: CARDIOVASCULAR - ADULT  Goal: Maintains optimal cardiac output and hemodynamic stability  Description: INTERVENTIONS:  - Monitor vital signs, rhythm, and trends  - Monitor for bleeding, hypotension and signs of decreased cardiac output  - Evaluate effectiveness of vasoactive medications to optimize hemodynamic stability  - Monitor arterial and/or venous puncture sites for bleeding and/or hematoma  - Assess quality of pulses, skin color and temperature  - Assess for signs of decreased coronary artery perfusion - ex.  Angina  - Evaluate fluid balance, assess for edema, trend weights  Outcome: Progressing  Goal: Absence of cardiac arrhythmias or at baseline  Description: INTERVENTIONS:  - Continuous cardiac monitoring, monitor vital signs, obtain 12 lead EKG if indicated  - Evaluate effectiveness of antiarrhythmic and heart rate control medications as ordered  - Initiate emergency measures for life threatening arrhythmias  - Monitor electrolytes and administer replacement therapy as ordered  Outcome: Progressing     Problem: SAFETY ADULT - FALL  Goal: Free from fall injury  Description: INTERVENTIONS:  - Assess pt frequently for physical needs  - Identify cognitive and physical deficits and behaviors that affect risk of falls.   - [place] fall precautions as indicated by assessment.  - Educate pt/family on patient safety including physical limitations  - Instruct pt to call for assistance with activity based on assessment  - Modify environment to reduce risk of injury  - Provide assistive devices as appropriate  - Consider OT/PT consult to assist with strengthening/mobility  - Encourage toileting schedule  Outcome: Progressing     Problem: DISCHARGE PLANNING  Goal: Discharge to home or other facility with appropriate resources  Description: INTERVENTIONS:  - Identify barriers to discharge w/pt and caregiver  - Include patient/family/discharge partner in discharge planning  - Arrange for needed discharge resources and transportation as appropriate  - Identify discharge learning needs (meds, wound care, etc)  - Arrange for interpreters to assist at discharge as needed  - Consider post-discharge preferences of patient/family/discharge partner  - Complete POLST form as appropriate  - Assess patient's ability to be responsible for managing their own health  - Refer to Case Management Department for coordinating discharge planning if the patient needs post-hospital services based on physician/LIP order or complex needs related to functional status, cognitive ability or social support system  Outcome: Progressing

## 2022-05-28 VITALS
HEIGHT: 62 IN | SYSTOLIC BLOOD PRESSURE: 169 MMHG | RESPIRATION RATE: 16 BRPM | OXYGEN SATURATION: 97 % | TEMPERATURE: 98 F | BODY MASS INDEX: 24.82 KG/M2 | WEIGHT: 134.88 LBS | DIASTOLIC BLOOD PRESSURE: 78 MMHG | HEART RATE: 75 BPM

## 2022-05-28 LAB
ANION GAP SERPL CALC-SCNC: 7 MMOL/L (ref 0–18)
BASOPHILS # BLD AUTO: 0.02 X10(3) UL (ref 0–0.2)
BASOPHILS NFR BLD AUTO: 0.6 %
BUN BLD-MCNC: 26 MG/DL (ref 7–18)
BUN/CREAT SERPL: 28.9 (ref 10–20)
CALCIUM BLD-MCNC: 8.7 MG/DL (ref 8.5–10.1)
CHLORIDE SERPL-SCNC: 108 MMOL/L (ref 98–112)
CO2 SERPL-SCNC: 26 MMOL/L (ref 21–32)
CREAT BLD-MCNC: 0.9 MG/DL
DEPRECATED RDW RBC AUTO: 42.5 FL (ref 35.1–46.3)
EOSINOPHIL # BLD AUTO: 0.15 X10(3) UL (ref 0–0.7)
EOSINOPHIL NFR BLD AUTO: 4.5 %
ERYTHROCYTE [DISTWIDTH] IN BLOOD BY AUTOMATED COUNT: 11.9 % (ref 11–15)
GLUCOSE BLD-MCNC: 178 MG/DL (ref 70–99)
GLUCOSE BLDC GLUCOMTR-MCNC: 149 MG/DL (ref 70–99)
GLUCOSE BLDC GLUCOMTR-MCNC: 163 MG/DL (ref 70–99)
GLUCOSE BLDC GLUCOMTR-MCNC: 66 MG/DL (ref 70–99)
GLUCOSE BLDC GLUCOMTR-MCNC: 74 MG/DL (ref 70–99)
HCT VFR BLD AUTO: 37.3 %
HGB BLD-MCNC: 11.9 G/DL
IMM GRANULOCYTES # BLD AUTO: 0 X10(3) UL (ref 0–1)
IMM GRANULOCYTES NFR BLD: 0 %
LYMPHOCYTES # BLD AUTO: 0.64 X10(3) UL (ref 1–4)
LYMPHOCYTES NFR BLD AUTO: 19 %
MAGNESIUM SERPL-MCNC: 2 MG/DL (ref 1.6–2.6)
MCH RBC QN AUTO: 31.1 PG (ref 26–34)
MCHC RBC AUTO-ENTMCNC: 31.9 G/DL (ref 31–37)
MCV RBC AUTO: 97.4 FL
MONOCYTES # BLD AUTO: 0.37 X10(3) UL (ref 0.1–1)
MONOCYTES NFR BLD AUTO: 11 %
NEUTROPHILS # BLD AUTO: 2.18 X10 (3) UL (ref 1.5–7.7)
NEUTROPHILS # BLD AUTO: 2.18 X10(3) UL (ref 1.5–7.7)
NEUTROPHILS NFR BLD AUTO: 64.9 %
OSMOLALITY SERPL CALC.SUM OF ELEC: 301 MOSM/KG (ref 275–295)
PHOSPHATE SERPL-MCNC: 2 MG/DL (ref 2.5–4.9)
PLATELET # BLD AUTO: 110 10(3)UL (ref 150–450)
POTASSIUM SERPL-SCNC: 3.6 MMOL/L (ref 3.5–5.1)
RBC # BLD AUTO: 3.83 X10(6)UL
SODIUM SERPL-SCNC: 141 MMOL/L (ref 136–145)
WBC # BLD AUTO: 3.4 X10(3) UL (ref 4–11)

## 2022-05-28 PROCEDURE — 99217 OBSERVATION CARE DISCHARGE: CPT | Performed by: HOSPITALIST

## 2022-05-28 NOTE — DISCHARGE SUMMARY
Hospital Discharge Diagnoses: Syncope and collapse    Lace+ Score: 69  59-90 High Risk  29-58 Medium Risk  0-28   Low Risk. TCM Follow-Up Recommendation:  LACE < 29: Low Risk of readmission after discharge from the hospital; Still recommend for TCM follow-up.     Please refer to dictated DC summary

## 2022-05-28 NOTE — PLAN OF CARE
Pt is A&Ox2, forgetful and confused at times. Frequently reoriented throughout the night. IVF continued. Plan to DC back to [facility] when medically cleared. Fall precautions in place, call light w/in reach. Problem: Patient Centered Care  Goal: Patient preferences are identified and integrated in the patient's plan of care  Description: Interventions:  - What would you like us to know as we care for you? My family helps care for me.   - Provide timely, complete, and accurate information to patient/family  - Incorporate patient and family knowledge, values, beliefs, and cultural backgrounds into the planning and delivery of care  - Encourage patient/family to participate in care and decision-making at the level they choose  - Honor patient and family perspectives and choices  Outcome: Progressing     Problem: Patient/Family Goals  Goal: Patient/Family Long Term Goal  Description: Patient's Long Term Goal: Manage Health    Interventions:  - Educate family/caretakers on medical regimen  - Patient understands how to care for self at home  - Monitor symptoms   - See additional Care Plan goals for specific interventions  Outcome: Progressing  Goal: Patient/Family Short Term Goal  Description: Patient's Short Term Goal: No syncopal episodes    Interventions:   - Monitor vitals  - tele box  - Orthostatics  - IVF  - Echo  - See additional Care Plan goals for specific interventions  Outcome: Progressing     Problem: CARDIOVASCULAR - ADULT  Goal: Maintains optimal cardiac output and hemodynamic stability  Description: INTERVENTIONS:  - Monitor vital signs, rhythm, and trends  - Monitor for bleeding, hypotension and signs of decreased cardiac output  - Evaluate effectiveness of vasoactive medications to optimize hemodynamic stability  - Monitor arterial and/or venous puncture sites for bleeding and/or hematoma  - Assess quality of pulses, skin color and temperature  - Assess for signs of decreased coronary artery perfusion - ex. Angina  - Evaluate fluid balance, assess for edema, trend weights  Outcome: Progressing  Goal: Absence of cardiac arrhythmias or at baseline  Description: INTERVENTIONS:  - Continuous cardiac monitoring, monitor vital signs, obtain 12 lead EKG if indicated  - Evaluate effectiveness of antiarrhythmic and heart rate control medications as ordered  - Initiate emergency measures for life threatening arrhythmias  - Monitor electrolytes and administer replacement therapy as ordered  Outcome: Progressing     Problem: SAFETY ADULT - FALL  Goal: Free from fall injury  Description: INTERVENTIONS:  - Assess pt frequently for physical needs  - Identify cognitive and physical deficits and behaviors that affect risk of falls.   - [place] fall precautions as indicated by assessment.  - Educate pt/family on patient safety including physical limitations  - Instruct pt to call for assistance with activity based on assessment  - Modify environment to reduce risk of injury  - Provide assistive devices as appropriate  - Consider OT/PT consult to assist with strengthening/mobility  - Encourage toileting schedule  Outcome: Progressing     Problem: DISCHARGE PLANNING  Goal: Discharge to home or other facility with appropriate resources  Description: INTERVENTIONS:  - Identify barriers to discharge w/pt and caregiver  - Include patient/family/discharge partner in discharge planning  - Arrange for needed discharge resources and transportation as appropriate  - Identify discharge learning needs (meds, wound care, etc)  - Arrange for interpreters to assist at discharge as needed  - Consider post-discharge preferences of patient/family/discharge partner  - Complete POLST form as appropriate  - Assess patient's ability to be responsible for managing their own health  - Refer to Case Management Department for coordinating discharge planning if the patient needs post-hospital services based on physician/LIP order or complex needs related to functional status, cognitive ability or social support system  Outcome: Progressing

## 2022-05-28 NOTE — PLAN OF CARE
Pt alert and oriented x2. Pt on room air. Pt forgetful; frequent reorientation provided. Pt to discharge to [facility] today via personal car. Marii Alexander from [facility] called and given update. Anitra Mooredaniel not available. Pt's daughter Deysi Senior educated on discharge instructions. No questions at this time. Problem: Patient Centered Care  Goal: Patient preferences are identified and integrated in the patient's plan of care  Description: Interventions:  - What would you like us to know as we care for you? My family helps care for me.   - Provide timely, complete, and accurate information to patient/family  - Incorporate patient and family knowledge, values, beliefs, and cultural backgrounds into the planning and delivery of care  - Encourage patient/family to participate in care and decision-making at the level they choose  - Honor patient and family perspectives and choices  Outcome: Completed     Problem: Patient/Family Goals  Goal: Patient/Family Long Term Goal  Description: Patient's Long Term Goal: Manage Health    Interventions:  - Educate family/caretakers on medical regimen  - Patient understands how to care for self at home  - Monitor symptoms   - See additional Care Plan goals for specific interventions  Outcome: Completed  Goal: Patient/Family Short Term Goal  Description: Patient's Short Term Goal: No syncopal episodes    Interventions:   - Monitor vitals  - tele box  - Orthostatics  - IVF  - Echo  - See additional Care Plan goals for specific interventions  Outcome: Completed     Problem: CARDIOVASCULAR - ADULT  Goal: Maintains optimal cardiac output and hemodynamic stability  Description: INTERVENTIONS:  - Monitor vital signs, rhythm, and trends  - Monitor for bleeding, hypotension and signs of decreased cardiac output  - Evaluate effectiveness of vasoactive medications to optimize hemodynamic stability  - Monitor arterial and/or venous puncture sites for bleeding and/or hematoma  - Assess quality of pulses, skin color and temperature  - Assess for signs of decreased coronary artery perfusion - ex. Angina  - Evaluate fluid balance, assess for edema, trend weights  Outcome: Completed  Goal: Absence of cardiac arrhythmias or at baseline  Description: INTERVENTIONS:  - Continuous cardiac monitoring, monitor vital signs, obtain 12 lead EKG if indicated  - Evaluate effectiveness of antiarrhythmic and heart rate control medications as ordered  - Initiate emergency measures for life threatening arrhythmias  - Monitor electrolytes and administer replacement therapy as ordered  Outcome: Completed     Problem: SAFETY ADULT - FALL  Goal: Free from fall injury  Description: INTERVENTIONS:  - Assess pt frequently for physical needs  - Identify cognitive and physical deficits and behaviors that affect risk of falls.   - [place] fall precautions as indicated by assessment.  - Educate pt/family on patient safety including physical limitations  - Instruct pt to call for assistance with activity based on assessment  - Modify environment to reduce risk of injury  - Provide assistive devices as appropriate  - Consider OT/PT consult to assist with strengthening/mobility  - Encourage toileting schedule  Outcome: Completed     Problem: DISCHARGE PLANNING  Goal: Discharge to home or other facility with appropriate resources  Description: INTERVENTIONS:  - Identify barriers to discharge w/pt and caregiver  - Include patient/family/discharge partner in discharge planning  - Arrange for needed discharge resources and transportation as appropriate  - Identify discharge learning needs (meds, wound care, etc)  - Arrange for interpreters to assist at discharge as needed  - Consider post-discharge preferences of patient/family/discharge partner  - Complete POLST form as appropriate  - Assess patient's ability to be responsible for managing their own health  - Refer to Case Management Department for coordinating discharge planning if the patient needs post-hospital services based on physician/LIP order or complex needs related to functional status, cognitive ability or social support system  Outcome: Completed

## 2022-05-31 ENCOUNTER — PATIENT MESSAGE (OUTPATIENT)
Dept: INTERNAL MEDICINE CLINIC | Facility: CLINIC | Age: 87
End: 2022-05-31

## 2022-05-31 ENCOUNTER — TELEPHONE (OUTPATIENT)
Dept: INTERNAL MEDICINE CLINIC | Facility: CLINIC | Age: 87
End: 2022-05-31

## 2022-05-31 ENCOUNTER — PATIENT OUTREACH (OUTPATIENT)
Dept: CASE MANAGEMENT | Age: 87
End: 2022-05-31

## 2022-05-31 DIAGNOSIS — R55 SYNCOPE AND COLLAPSE: ICD-10-CM

## 2022-05-31 DIAGNOSIS — Z02.9 ENCOUNTERS FOR UNSPECIFIED ADMINISTRATIVE PURPOSE: ICD-10-CM

## 2022-05-31 PROCEDURE — 1111F DSCHRG MED/CURRENT MED MERGE: CPT

## 2022-05-31 RX ORDER — EMPAGLIFLOZIN 10 MG/1
TABLET, FILM COATED ORAL
Qty: 90 TABLET | Refills: 1 | Status: SHIPPED | OUTPATIENT
Start: 2022-05-31

## 2022-05-31 RX ORDER — INSULIN GLARGINE 100 [IU]/ML
20 INJECTION, SOLUTION SUBCUTANEOUS EVERY MORNING
Qty: 18 ML | Refills: 1 | Status: SHIPPED | OUTPATIENT
Start: 2022-05-31

## 2022-05-31 NOTE — DISCHARGE SUMMARY
[facility]    PATIENT'S NAME: Petra Chowdhury   ATTENDING PHYSICIAN: Baldomero Valdovinos MD   PATIENT ACCOUNT#:   [id number]    LOCATION:  [facility]  MEDICAL RECORD #:   [id number]       YOB: 1930  ADMISSION DATE:       05/26/2022      DISCHARGE DATE:  05/28/2022    DISCHARGE SUMMARY    DISCHARGE DIAGNOSES:  1. Syncope and collapse, present on admission. 2.   Hypoglycemia, present on admission. 3.   Labile blood pressure, present on admission. 4.   Hypoparathyroidism. HISTORY AND HOSPITAL COURSE:  The patient is a 49-year-old female who was admitted to the hospital after she was having a witnessed syncopal episode while she was face-timing with her daughter at her assisted living facility. History has been obtained from the daughter and medical personnel. Her daughter states that she just put her head back and she was out for like maybe 4 to 5 seconds until she regained consciousness. She was admitted to the hospital.  She had a cardiac workup done with an echocardiogram which was within normal limits. No tele events noted. She was slightly found to be hypoglycemic but that is because the daughter stated that she has not been eating well. Currently, her blood pressure has been slightly labile but her home medications have been on hold currently. At this time, she has been hydrated with 0.9 normal saline. She denies any other complaints at this time. There have been no other acute events. At this time, she is encouraged to have good p.o. intake as this could be possibly secondary to vasovagal versus orthostasis. All the information was given to patient's family members as well as patient and they verbalized understanding of information given. For details regarding the patient's hospitalization, please refer to patient's chart. PHYSICAL EXAMINATION:  GENERAL:  Appears to be in no acute distress at this time. She is A and O x3.    VITAL SIGNS:  Reviewed from the patient's chart and currently stable. HEENT:  Extraocular movements are intact. Pupils equally round and reactive to light and accommodation. Atraumatic, normocephalic. LUNGS:  Good air entry bilaterally. CARDIAC:  S1, S2 appreciated. ABDOMEN:  Soft, nontender, nondistended. EXTREMITIES:  Peripheral pulses are positive. NEUROLOGICAL:  No focal neurological deficits noted at this time. LABORATORY DATA:  Labs are reviewed. DISPOSITION:  At this time, the patient is deemed stable to be discharged home. Total discharge time greater than 30 minutes.     Dictated By Salma Santos MD  d: 05/28/2022 14:56:45  t: 05/30/2022 03:53:14  Job [id number]  QPZ/

## 2022-06-01 ENCOUNTER — TELEPHONE (OUTPATIENT)
Dept: INTERNAL MEDICINE CLINIC | Facility: CLINIC | Age: 87
End: 2022-06-01

## 2022-06-01 NOTE — TELEPHONE ENCOUNTER
Pt discharged 5/28/22, syncope and collapse. Pt does not have HFU appt scheduled at this time. Spoke with Hitesh Hood (pt's daughter, ok per HIPAA) who said she spoke with PCP today and he said appt in August was fine and pt didn't need to be seen until she was feeling worse. Pt is isolating until Sunday 6/5/22 at [email or web address] due to covid + on 5/26/22. TCM/HFU appt recommended by 6/4/22 as pt is a high risk for readmission. Please discuss with PCP and contact pt's daughter, Hitesh Hood re: appt. Thank you!     BOOK BY DATE (last date for TCM): 6/11/22

## 2022-06-01 NOTE — TELEPHONE ENCOUNTER
Telephone call to patient's daughter and situation discussed. Patient was recently hospitalized at [facility] with an episode of syncope possibly related to a vasovagal issue. Time admitted patient's medications were readjusted while she was in the hospital patient also tested positive for COVID while she was in the hospital.  Patient is return to [facility]. [facility]. Patient's daughter has been checking on her on a regular basis. She feels the patient is currently doing well. She will keep me updated as to how the patient is doing. She has made an appointment for the patient to follow-up and see me in the near future. Daughter will contact me if patient needs to be sooner than that and we can make arrangements as necessary.

## 2022-06-01 NOTE — TELEPHONE ENCOUNTER
.  I did discuss the situation with the patient's daughter earlier today. She notes that her mother, the patient, is currently doing well back at the assisted living facility. Unfortunately, the patient is currently in [facility] isolation for 10 days as she has tested positive for COVID at this time. Patient's daughter speaks with her mother every day and notes that her mother is doing well at this time. She does note that her mother is having memory problems but this been going on for some time. At this point she feels her mother is back to normal and does not feel the need to bring her in to see me at this time. Also at this time patient cannot come in to see me as she does have active COVID and is and is quarantined at [facility]. Patient's daughter will stay in touch with me as to how the patient is doing. Currently I plan to see the patient back in August as currently scheduled. I will see the patient sooner if the patient's daughter feels that it is appropriate and necessary to do this.   Thank you!!

## 2022-06-17 ENCOUNTER — OFFICE VISIT (OUTPATIENT)
Dept: INTERNAL MEDICINE CLINIC | Facility: CLINIC | Age: 87
End: 2022-06-17
Payer: MEDICARE

## 2022-06-17 VITALS
TEMPERATURE: 98 F | WEIGHT: 132.38 LBS | SYSTOLIC BLOOD PRESSURE: 114 MMHG | BODY MASS INDEX: 24.36 KG/M2 | HEART RATE: 52 BPM | OXYGEN SATURATION: 98 % | DIASTOLIC BLOOD PRESSURE: 56 MMHG | HEIGHT: 62 IN

## 2022-06-17 DIAGNOSIS — I10 ESSENTIAL HYPERTENSION: ICD-10-CM

## 2022-06-17 DIAGNOSIS — I25.10 ASHD (ARTERIOSCLEROTIC HEART DISEASE): ICD-10-CM

## 2022-06-17 DIAGNOSIS — E11.42 TYPE 2 DIABETES MELLITUS WITH DIABETIC POLYNEUROPATHY, WITH LONG-TERM CURRENT USE OF INSULIN (HCC): Primary | ICD-10-CM

## 2022-06-17 DIAGNOSIS — G62.9 PERIPHERAL POLYNEUROPATHY: ICD-10-CM

## 2022-06-17 DIAGNOSIS — E03.9 HYPOTHYROIDISM, UNSPECIFIED TYPE: ICD-10-CM

## 2022-06-17 DIAGNOSIS — C50.911 MALIGNANT NEOPLASM OF RIGHT FEMALE BREAST, UNSPECIFIED ESTROGEN RECEPTOR STATUS, UNSPECIFIED SITE OF BREAST (HCC): ICD-10-CM

## 2022-06-17 DIAGNOSIS — R53.83 FATIGUE, UNSPECIFIED TYPE: ICD-10-CM

## 2022-06-17 DIAGNOSIS — D69.6 THROMBOCYTOPENIA (HCC): ICD-10-CM

## 2022-06-17 DIAGNOSIS — K21.9 GASTROESOPHAGEAL REFLUX DISEASE WITHOUT ESOPHAGITIS: ICD-10-CM

## 2022-06-17 DIAGNOSIS — Z79.4 TYPE 2 DIABETES MELLITUS WITH DIABETIC POLYNEUROPATHY, WITH LONG-TERM CURRENT USE OF INSULIN (HCC): Primary | ICD-10-CM

## 2022-06-17 DIAGNOSIS — Z79.4 TYPE 2 DIABETES MELLITUS WITH STAGE 3B CHRONIC KIDNEY DISEASE, WITH LONG-TERM CURRENT USE OF INSULIN (HCC): ICD-10-CM

## 2022-06-17 DIAGNOSIS — N18.32 STAGE 3B CHRONIC KIDNEY DISEASE (HCC): ICD-10-CM

## 2022-06-17 DIAGNOSIS — E78.00 HYPERCHOLESTEROLEMIA: ICD-10-CM

## 2022-06-17 DIAGNOSIS — Z00.00 ANNUAL PHYSICAL EXAM: ICD-10-CM

## 2022-06-17 DIAGNOSIS — E11.22 TYPE 2 DIABETES MELLITUS WITH STAGE 3B CHRONIC KIDNEY DISEASE, WITH LONG-TERM CURRENT USE OF INSULIN (HCC): ICD-10-CM

## 2022-06-17 DIAGNOSIS — K59.00 CONSTIPATION, UNSPECIFIED CONSTIPATION TYPE: ICD-10-CM

## 2022-06-17 DIAGNOSIS — N18.32 TYPE 2 DIABETES MELLITUS WITH STAGE 3B CHRONIC KIDNEY DISEASE, WITH LONG-TERM CURRENT USE OF INSULIN (HCC): ICD-10-CM

## 2022-06-17 DIAGNOSIS — E55.9 VITAMIN D DEFICIENCY: ICD-10-CM

## 2022-06-17 DIAGNOSIS — Z95.1 S/P CABG X 3: ICD-10-CM

## 2022-06-17 DIAGNOSIS — I73.9 PERIPHERAL VASCULAR DISEASE (HCC): ICD-10-CM

## 2022-06-17 PROCEDURE — 1111F DSCHRG MED/CURRENT MED MERGE: CPT | Performed by: INTERNAL MEDICINE

## 2022-06-17 PROCEDURE — 1126F AMNT PAIN NOTED NONE PRSNT: CPT | Performed by: INTERNAL MEDICINE

## 2022-06-17 PROCEDURE — 99214 OFFICE O/P EST MOD 30 MIN: CPT | Performed by: INTERNAL MEDICINE

## 2022-06-17 NOTE — PATIENT INSTRUCTIONS
1.  Patient is to continue her current diet, medication and activity. 2.  I have encouraged the patient to get her second COVID booster vaccine in the near future. 3.  I will plan to see the patient back in 3 months with blood test, urinalysis and EKG for her annual physical examination. 4.  I will see the patient back sooner as necessary. Assessment/Plan:    1. Uncontrolled type 2 diabetes mellitus with hyperglycemia, with long-term current use of insulin (H)  Type 2 diabetes fair control with A1c increasing slightly from 7.6% up to 7.8% with 19 pound weight gain since prior exam physical exam November 12, 2020.  Continue attempts at weight goal less than 250 pounds initially, less than 240 pounds ideally.  Med management completed.  Microalbumin screen updated.  Current 7-day average 208.  Using Trulicity 1.5 mg weekly, Lantus 40 units daily Metformin  mg using 2 tablets twice daily.  - Glycosylated Hemoglobin A1c  - Microalbumin, Random Urine  - Basic Metabolic Panel    2. Essential hypertension, benign  Blood pressure on recheck 118/70.  Continues use of lisinopril 20 mg daily.  - Basic Metabolic Panel    3. Hyperlipidemia, unspecified hyperlipidemia type  Remains on simvastatin 40 mg at bedtime and will repeat lipid cascade at follow-up in 4 months.    4. Spinal stenosis of lumbar region, unspecified whether neurogenic claudication present  Recent spinal surgery as noted with out improvement.  Has repeat imaging scheduled for tomorrow with follow-up with Dr. Lopez March 16, 2021.    5. Dermatitis  Describe dermatitis.  Patchy irritation left thigh lateral, anterior as well as medial without intertrigo findings.  Trial of terbinafine cream twice daily x14 to 21 days.  Notify if persistent issues.      The following high BMI interventions were performed this visit: encouragement to exercise, weight monitoring, weight loss from baseline weight and lifestyle education regarding diet .  Ensure ongoing efforts to achieve weight goal < 250 pounds initially, < 240 pounds ideally.         Subjective:    Leandro AISHA Brewer is seen today for follow-up assessment.  Type 2 diabetes.  19 pound weight gain since prior physical November 2020.  Recent surgery for lower back issues with right lower extremity radiculopathy.  Surgery January 20, 2021.   "Unfortunately has not experienced any improvement at this point.  Has repeat imaging and then following up with surgeon Dr. Lopez next Tuesday to discuss findings and further treatment options.  Patient with simvastatin 40 mg at bedtime for lipid management as well as lisinopril 20 mg daily for hypertension.  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  Denies recent illness including fever cough or shortness of breath.  Comprehensive review of systems as above otherwise all negative.     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 Thompson); right inguinal hernia age 6 months; right total hip arthroplasty at [facility] 2019 with Dr. Evans.   Hospitalizations: early 20s \"infected gallbladder\" WITHOUT surgery... ; cellulitis in legs (hospitalized twice); abdominal wall cyst requiring IV antibiotics x 4 days... ; right Lumbar 3 - Lumbar 4 hemilaminectomy and medial facetectomy and Right redo Lumbar 4 -Lumbar 5 hemilaminectomy and medial facetectomy 21 (Dr. Lopez)    19-19 [facility] for right LE cellulitis/sepsis; right LE cellulitis 19-19 ([facility]); 10/15/19 s/p left MELITA (Dr. Evans)  Would like a 90 day supply on all meds.   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 Villarreal      HERNIA REPAIR Right     inguinal; child     [place] HARE W/O FACETEC FORAMOT/DSKC  VRT " SEG, LUMBAR Right 1/20/2021    Procedure: Right Lumbar 3 - Lumbar 4 hemilaminectomy and medial facetectomy and Right redo Lumbar 4 -Lumbar 5 hemilaminectomy and medial facetectomy;  Surgeon: Julissa Lopez MD;  Location: [facility];  Service: Spine     TOTAL HIP ARTHROPLASTY Bilateral 2019    Feb 5, 2019 (right) and October 19, 2019 (left)        Family History   Problem Relation Age of Onset     Heart attack Mother      Obesity Mother      Heart disease Father      Cancer Father         throat     Heart attack Father      Diabetes Sister      Diabetes Brother         Past Medical History:   Diagnosis Date     Anemia      Arthritis      Diabetes mellitus, type II (H)      Hypertension      Obesity      Plantar fasciitis         Social History     Tobacco Use     Smoking status: Former Smoker     Smokeless tobacco: Never Used   Substance Use Topics     Alcohol use: Yes     Alcohol/week: 1.0 standard drinks     Types: 1 Shots of liquor per week     Comment: occasional     Drug use: No        Current Outpatient Medications   Medication Sig Dispense Refill     acetaminophen (TYLENOL) 500 MG tablet Take 1,000 mg by mouth 3 (three) times a day.        albuterol (VENTOLIN HFA) 90 mcg/actuation inhaler Inhale 2 puffs 4 (four) times a day as needed for wheezing. 18 g 6     ascorbic acid, vitamin C, (VITAMIN C) 500 MG tablet Take 500 mg by mouth daily.       b complex vitamins (VITAMINS B COMPLEX) capsule Take 1 capsule by mouth.       DULoxetine (CYMBALTA) 30 MG capsule TAKE 1 CAPSULE BY MOUTH EVERY DAY 30 capsule 1     furosemide (LASIX) 40 MG tablet TAKE 0.5 TABLETS (20 MG TOTAL) BY MOUTH DAILY. 45 tablet 3     gabapentin (NEURONTIN) 300 MG capsule Take 3 capsules (900 mg total) by mouth 3 (three) times a day. Increase by 1 tab every 3 days.  Max dose 900 mg tid 810 capsule 3     insulin glargine (LANTUS SOLOSTAR U-100 INSULIN) 100 unit/mL (3 mL) pen Inject 40 Units under the skin every morning. 45 mL 1      "LANCETS MISC [employer] Microlet MISC  Check blood sugar 2 times per day and as needed       lisinopriL (PRINIVIL,ZESTRIL) 20 MG tablet TAKE 1 TABLET BY MOUTH EVERY DAY 90 tablet 3     magnesium oxide 500 mg Tab Take 500 mg by mouth daily.              metFORMIN (GLUCOPHAGE-XR) 500 MG 24 hr tablet TAKE 2 TABLETS BY MOUTH TWICE A DAY WITH MEALS 360 tablet 2     methocarbamoL (ROBAXIN) 750 MG tablet Take 1 tablet (750 mg total) by mouth every 6 (six) hours as needed. 56 tablet 1     multivitamin (MULTIPLE VITAMINS DAILY) per tablet Take 1 tablet by mouth daily.       oxyCODONE (ROXICODONE) 5 MG immediate release tablet Take 1-2 tablets (5-10 mg total) by mouth every 4 (four) hours as needed (For pain score 5-7 give 5 mg.  For pain score 8-10 give 10 mg.). 50 tablet 0     pen needle, diabetic (BD ULTRA-FINE BRANDON PEN NEEDLE) 32 gauge x 5/32\" Ndle Use 1 each As Directed daily. 100 each 3     potassium 99 mg Tab Take 1 tablet by mouth 2 (two) times a day.              QVAR REDIHALER 80 mcg/actuation HFAB inhaler INHALE 2 PUFFS BY MOUTH TWICE A DAY - RINSE MOUTH AFTER USE-DO NOT SHAKE UNIT 31.8 g 3     senna-docusate (PERICOLACE) 8.6-50 mg tablet Take 1 tablet by mouth 2 (two) times a day.  0     sildenafil (REVATIO) 20 mg tablet Take 2-3 tablets (40-60 mg total) by mouth daily as needed. 90 tablet 2     simvastatin (ZOCOR) 40 MG tablet TAKE 1 TABLET BY MOUTH EVERYDAY AT BEDTIME 90 tablet 3     TRULICITY 1.5 mg/0.5 mL PnIj INJECT 0.5ML SUBCUTANEOUSLY EVERY 7 DAYS. 6 Syringe 4     No current facility-administered medications for this visit.           Objective:    Vitals:    03/10/21 0726 03/10/21 0807   BP: 130/80 118/70   Pulse: (!) 104    Temp: 98.1  F (36.7  C)    SpO2: 96%    Weight: (!) 270 lb (122.5 kg)       Body mass index is 39.87 kg/m .    Alert.  No apparent distress at rest however does transfer very slowly due to chronic lower back pain.  Has a right knee brace on place however diminished DTRs at patella and " Achilles likely compared to left side.  Dermatitis in group proximal thigh lateral, anterior as well as some medial component as well.  No excoriation or drainage.      This note has been dictated using voice recognition software and as a result may contain minor grammatical errors and unintended word substitutions.

## 2022-08-11 ENCOUNTER — LAB ENCOUNTER (OUTPATIENT)
Dept: LAB | Facility: HOSPITAL | Age: 87
End: 2022-08-11
Attending: INTERNAL MEDICINE
Payer: MEDICARE

## 2022-08-11 ENCOUNTER — OFFICE VISIT (OUTPATIENT)
Dept: ENDOCRINOLOGY CLINIC | Facility: CLINIC | Age: 87
End: 2022-08-11
Payer: MEDICARE

## 2022-08-11 ENCOUNTER — OFFICE VISIT (OUTPATIENT)
Dept: INTERNAL MEDICINE CLINIC | Facility: CLINIC | Age: 87
End: 2022-08-11
Payer: MEDICARE

## 2022-08-11 VITALS
HEART RATE: 64 BPM | SYSTOLIC BLOOD PRESSURE: 120 MMHG | OXYGEN SATURATION: 96 % | TEMPERATURE: 98 F | BODY MASS INDEX: 23.92 KG/M2 | WEIGHT: 130 LBS | DIASTOLIC BLOOD PRESSURE: 56 MMHG | HEIGHT: 62 IN

## 2022-08-11 VITALS
SYSTOLIC BLOOD PRESSURE: 133 MMHG | DIASTOLIC BLOOD PRESSURE: 59 MMHG | WEIGHT: 131 LBS | BODY MASS INDEX: 24 KG/M2 | HEART RATE: 47 BPM

## 2022-08-11 DIAGNOSIS — I10 ESSENTIAL HYPERTENSION: ICD-10-CM

## 2022-08-11 DIAGNOSIS — N18.32 TYPE 2 DIABETES MELLITUS WITH STAGE 3B CHRONIC KIDNEY DISEASE, WITH LONG-TERM CURRENT USE OF INSULIN (HCC): ICD-10-CM

## 2022-08-11 DIAGNOSIS — Z95.1 S/P CABG X 3: ICD-10-CM

## 2022-08-11 DIAGNOSIS — Z00.00 ANNUAL PHYSICAL EXAM: ICD-10-CM

## 2022-08-11 DIAGNOSIS — C50.911 MALIGNANT NEOPLASM OF RIGHT FEMALE BREAST, UNSPECIFIED ESTROGEN RECEPTOR STATUS, UNSPECIFIED SITE OF BREAST (HCC): ICD-10-CM

## 2022-08-11 DIAGNOSIS — I25.10 ASHD (ARTERIOSCLEROTIC HEART DISEASE): ICD-10-CM

## 2022-08-11 DIAGNOSIS — Z79.4 TYPE 2 DIABETES MELLITUS WITH STAGE 3B CHRONIC KIDNEY DISEASE, WITH LONG-TERM CURRENT USE OF INSULIN (HCC): ICD-10-CM

## 2022-08-11 DIAGNOSIS — R53.83 FATIGUE, UNSPECIFIED TYPE: ICD-10-CM

## 2022-08-11 DIAGNOSIS — E78.00 HYPERCHOLESTEROLEMIA: ICD-10-CM

## 2022-08-11 DIAGNOSIS — E03.9 HYPOTHYROIDISM, UNSPECIFIED TYPE: ICD-10-CM

## 2022-08-11 DIAGNOSIS — E55.9 VITAMIN D DEFICIENCY: ICD-10-CM

## 2022-08-11 DIAGNOSIS — M81.0 AGE-RELATED OSTEOPOROSIS WITHOUT CURRENT PATHOLOGICAL FRACTURE: ICD-10-CM

## 2022-08-11 DIAGNOSIS — I73.9 PERIPHERAL VASCULAR DISEASE (HCC): ICD-10-CM

## 2022-08-11 DIAGNOSIS — Z79.4 TYPE 2 DIABETES MELLITUS WITH DIABETIC POLYNEUROPATHY, WITH LONG-TERM CURRENT USE OF INSULIN (HCC): ICD-10-CM

## 2022-08-11 DIAGNOSIS — E11.65 UNCONTROLLED TYPE 2 DIABETES MELLITUS WITH HYPERGLYCEMIA (HCC): Primary | ICD-10-CM

## 2022-08-11 DIAGNOSIS — Z00.00 ANNUAL PHYSICAL EXAM: Primary | ICD-10-CM

## 2022-08-11 DIAGNOSIS — N18.32 STAGE 3B CHRONIC KIDNEY DISEASE (HCC): ICD-10-CM

## 2022-08-11 DIAGNOSIS — E11.42 TYPE 2 DIABETES MELLITUS WITH DIABETIC POLYNEUROPATHY, WITH LONG-TERM CURRENT USE OF INSULIN (HCC): ICD-10-CM

## 2022-08-11 DIAGNOSIS — I10 HYPERTENSION, UNSPECIFIED TYPE: ICD-10-CM

## 2022-08-11 DIAGNOSIS — E11.22 TYPE 2 DIABETES MELLITUS WITH STAGE 3B CHRONIC KIDNEY DISEASE, WITH LONG-TERM CURRENT USE OF INSULIN (HCC): ICD-10-CM

## 2022-08-11 DIAGNOSIS — K21.9 GASTROESOPHAGEAL REFLUX DISEASE WITHOUT ESOPHAGITIS: ICD-10-CM

## 2022-08-11 DIAGNOSIS — H61.21 IMPACTED CERUMEN OF RIGHT EAR: ICD-10-CM

## 2022-08-11 DIAGNOSIS — G62.9 PERIPHERAL POLYNEUROPATHY: ICD-10-CM

## 2022-08-11 LAB
ALBUMIN SERPL-MCNC: 3.6 G/DL (ref 3.4–5)
ALBUMIN/GLOB SERPL: 0.9 {RATIO} (ref 1–2)
ALP LIVER SERPL-CCNC: 53 U/L
ALT SERPL-CCNC: 20 U/L
ANION GAP SERPL CALC-SCNC: 4 MMOL/L (ref 0–18)
AST SERPL-CCNC: 19 U/L (ref 15–37)
BASOPHILS # BLD AUTO: 0.1 X10(3) UL (ref 0–0.2)
BASOPHILS NFR BLD AUTO: 1.6 %
BILIRUB SERPL-MCNC: 0.4 MG/DL (ref 0.1–2)
BILIRUB UR QL: NEGATIVE
BUN BLD-MCNC: 41 MG/DL (ref 7–18)
BUN/CREAT SERPL: 31.1 (ref 10–20)
CALCIUM BLD-MCNC: 10.1 MG/DL (ref 8.5–10.1)
CARTRIDGE LOT#: ABNORMAL NUMERIC
CHLORIDE SERPL-SCNC: 106 MMOL/L (ref 98–112)
CHOLEST SERPL-MCNC: 176 MG/DL (ref ?–200)
CLARITY UR: CLEAR
CO2 SERPL-SCNC: 32 MMOL/L (ref 21–32)
COLOR UR: YELLOW
CREAT BLD-MCNC: 1.32 MG/DL
DEPRECATED RDW RBC AUTO: 43.1 FL (ref 35.1–46.3)
EOSINOPHIL # BLD AUTO: 0.72 X10(3) UL (ref 0–0.7)
EOSINOPHIL NFR BLD AUTO: 11.2 %
ERYTHROCYTE [DISTWIDTH] IN BLOOD BY AUTOMATED COUNT: 11.9 % (ref 11–15)
EST. AVERAGE GLUCOSE BLD GHB EST-MCNC: 151 MG/DL (ref 68–126)
FASTING PATIENT LIPID ANSWER: YES
FASTING STATUS PATIENT QL REPORTED: YES
GFR SERPLBLD BASED ON 1.73 SQ M-ARVRAT: 38 ML/MIN/1.73M2 (ref 60–?)
GLOBULIN PLAS-MCNC: 3.8 G/DL (ref 2.8–4.4)
GLUCOSE BLD-MCNC: 129 MG/DL (ref 70–99)
GLUCOSE BLOOD: 181
GLUCOSE UR-MCNC: >=1000 MG/DL
HBA1C MFR BLD: 6.9 % (ref ?–5.7)
HCT VFR BLD AUTO: 40.7 %
HDLC SERPL-MCNC: 52 MG/DL (ref 40–59)
HEMOGLOBIN A1C: 6.5 % (ref 4.3–5.6)
HGB BLD-MCNC: 12.7 G/DL
HGB UR QL STRIP.AUTO: NEGATIVE
IMM GRANULOCYTES # BLD AUTO: 0.01 X10(3) UL (ref 0–1)
IMM GRANULOCYTES NFR BLD: 0.2 %
KETONES UR-MCNC: NEGATIVE MG/DL
LDLC SERPL CALC-MCNC: 107 MG/DL (ref ?–100)
LYMPHOCYTES # BLD AUTO: 1.48 X10(3) UL (ref 1–4)
LYMPHOCYTES NFR BLD AUTO: 23 %
MCH RBC QN AUTO: 30.8 PG (ref 26–34)
MCHC RBC AUTO-ENTMCNC: 31.2 G/DL (ref 31–37)
MCV RBC AUTO: 98.5 FL
MONOCYTES # BLD AUTO: 0.52 X10(3) UL (ref 0.1–1)
MONOCYTES NFR BLD AUTO: 8.1 %
NEUTROPHILS # BLD AUTO: 3.6 X10 (3) UL (ref 1.5–7.7)
NEUTROPHILS # BLD AUTO: 3.6 X10(3) UL (ref 1.5–7.7)
NEUTROPHILS NFR BLD AUTO: 55.9 %
NITRITE UR QL STRIP.AUTO: NEGATIVE
NONHDLC SERPL-MCNC: 124 MG/DL (ref ?–130)
OSMOLALITY SERPL CALC.SUM OF ELEC: 306 MOSM/KG (ref 275–295)
PH UR: 5.5 [PH] (ref 5–8)
PLATELET # BLD AUTO: 175 10(3)UL (ref 150–450)
POTASSIUM SERPL-SCNC: 4.3 MMOL/L (ref 3.5–5.1)
PROT SERPL-MCNC: 7.4 G/DL (ref 6.4–8.2)
PROT UR-MCNC: NEGATIVE MG/DL
RBC # BLD AUTO: 4.13 X10(6)UL
SODIUM SERPL-SCNC: 142 MMOL/L (ref 136–145)
SP GR UR STRIP: 1.01 (ref 1–1.03)
TEST STRIP LOT #: NORMAL NUMERIC
TRIGL SERPL-MCNC: 94 MG/DL (ref 30–149)
TSI SER-ACNC: 3.75 MIU/ML (ref 0.36–3.74)
UROBILINOGEN UR STRIP-ACNC: 0.2
VIT D+METAB SERPL-MCNC: 53.4 NG/ML (ref 30–100)
VLDLC SERPL CALC-MCNC: 16 MG/DL (ref 0–30)
WBC # BLD AUTO: 6.4 X10(3) UL (ref 4–11)
WBC #/AREA URNS AUTO: >50 /HPF
WBC CLUMPS UR QL AUTO: PRESENT /HPF

## 2022-08-11 PROCEDURE — 84443 ASSAY THYROID STIM HORMONE: CPT

## 2022-08-11 PROCEDURE — 69209 REMOVE IMPACTED EAR WAX UNI: CPT | Performed by: INTERNAL MEDICINE

## 2022-08-11 PROCEDURE — 99214 OFFICE O/P EST MOD 30 MIN: CPT | Performed by: INTERNAL MEDICINE

## 2022-08-11 PROCEDURE — 36415 COLL VENOUS BLD VENIPUNCTURE: CPT

## 2022-08-11 PROCEDURE — 85025 COMPLETE CBC W/AUTO DIFF WBC: CPT

## 2022-08-11 PROCEDURE — 83036 HEMOGLOBIN GLYCOSYLATED A1C: CPT | Performed by: INTERNAL MEDICINE

## 2022-08-11 PROCEDURE — 99215 OFFICE O/P EST HI 40 MIN: CPT | Performed by: INTERNAL MEDICINE

## 2022-08-11 PROCEDURE — 87086 URINE CULTURE/COLONY COUNT: CPT | Performed by: INTERNAL MEDICINE

## 2022-08-11 PROCEDURE — 69210 REMOVE IMPACTED EAR WAX UNI: CPT | Performed by: INTERNAL MEDICINE

## 2022-08-11 PROCEDURE — 81015 MICROSCOPIC EXAM OF URINE: CPT | Performed by: INTERNAL MEDICINE

## 2022-08-11 PROCEDURE — 80053 COMPREHEN METABOLIC PANEL: CPT

## 2022-08-11 PROCEDURE — 1126F AMNT PAIN NOTED NONE PRSNT: CPT | Performed by: INTERNAL MEDICINE

## 2022-08-11 PROCEDURE — 93000 ELECTROCARDIOGRAM COMPLETE: CPT | Performed by: INTERNAL MEDICINE

## 2022-08-11 PROCEDURE — 81001 URINALYSIS AUTO W/SCOPE: CPT | Performed by: INTERNAL MEDICINE

## 2022-08-11 PROCEDURE — 82947 ASSAY GLUCOSE BLOOD QUANT: CPT | Performed by: INTERNAL MEDICINE

## 2022-08-11 PROCEDURE — 83036 HEMOGLOBIN GLYCOSYLATED A1C: CPT

## 2022-08-11 PROCEDURE — G0439 PPPS, SUBSEQ VISIT: HCPCS | Performed by: INTERNAL MEDICINE

## 2022-08-11 PROCEDURE — 80061 LIPID PANEL: CPT

## 2022-08-11 PROCEDURE — 82306 VITAMIN D 25 HYDROXY: CPT

## 2022-08-11 RX ORDER — GLIMEPIRIDE 2 MG/1
2 TABLET ORAL
Qty: 90 TABLET | Refills: 3 | Status: SHIPPED | OUTPATIENT
Start: 2022-08-11 | End: 2023-08-11

## 2022-08-11 RX ORDER — GLIMEPIRIDE 2 MG/1
TABLET ORAL
Qty: 90 TABLET | Refills: 3 | OUTPATIENT
Start: 2022-08-11

## 2022-08-11 RX ORDER — RISEDRONATE SODIUM 35 MG/1
35 TABLET, FILM COATED ORAL
Qty: 12 TABLET | Refills: 3 | Status: SHIPPED | OUTPATIENT
Start: 2022-08-11

## 2022-08-11 NOTE — PATIENT INSTRUCTIONS
1.  Patient is to continue her current diet, medication and activity. 2.  Patient is encouraged to get the next COVID vaccine when it becomes available in October or November. 3.  Patient is to get the flu vaccine this autumn. 4.  I will see the patient back in 3 months with blood tests as ordered. 5.  I will see the patient back sooner as necessary.

## 2022-08-23 RX ORDER — B-COMPLEX WITH VITAMIN C
1 TABLET ORAL DAILY
Qty: 90 TABLET | Refills: 3 | OUTPATIENT
Start: 2022-08-23

## 2022-08-23 RX ORDER — LISINOPRIL AND HYDROCHLOROTHIAZIDE 25; 20 MG/1; MG/1
1 TABLET ORAL 2 TIMES DAILY
Qty: 180 TABLET | Refills: 3 | OUTPATIENT
Start: 2022-08-23 | End: 2023-08-23

## 2022-08-30 RX ORDER — INSULIN GLARGINE 100 [IU]/ML
INJECTION, SOLUTION SUBCUTANEOUS
Qty: 18 ML | Refills: 1 | Status: SHIPPED | OUTPATIENT
Start: 2022-08-30

## 2022-08-30 NOTE — TELEPHONE ENCOUNTER
Last office visitL 8/11/22 notes:     -Continue Jardiance to 10mg PO daily given decreased GFR  -Continue Glimepiride 2mg PO daily  -Continue Lantus 18 units subcutaneous daily (slight adjustment if lower activity level)     Last refill: 5/31/22

## 2022-09-02 RX ORDER — PEN NEEDLE, DIABETIC 32GX 5/32"
NEEDLE, DISPOSABLE MISCELLANEOUS
Qty: 100 EACH | Refills: 3 | Status: SHIPPED | OUTPATIENT
Start: 2022-09-02

## 2022-10-14 RX ORDER — AMLODIPINE BESYLATE 5 MG/1
TABLET ORAL
Qty: 90 TABLET | Refills: 3 | Status: SHIPPED | OUTPATIENT
Start: 2022-10-14

## 2022-10-31 RX ORDER — PRAVASTATIN SODIUM 40 MG
TABLET ORAL
Qty: 90 TABLET | Refills: 3 | Status: SHIPPED | OUTPATIENT
Start: 2022-10-31

## 2022-11-07 RX ORDER — DILTIAZEM HYDROCHLORIDE 240 MG/1
CAPSULE, COATED, EXTENDED RELEASE ORAL
Qty: 90 CAPSULE | Refills: 3 | Status: SHIPPED | OUTPATIENT
Start: 2022-11-07

## 2022-11-23 NOTE — TELEPHONE ENCOUNTER
LOV: 8/11/2022    RTC: 6 months     F/U: 2/13/2023    Dr Alfred Cotto-- orders pending, approve if appropriate

## 2022-11-25 RX ORDER — EMPAGLIFLOZIN 10 MG/1
TABLET, FILM COATED ORAL
Qty: 90 TABLET | Refills: 1 | Status: SHIPPED | OUTPATIENT
Start: 2022-11-25

## 2023-01-01 ENCOUNTER — HOSPITAL ENCOUNTER (INPATIENT)
Facility: HOSPITAL | Age: 88
LOS: 3 days | Discharge: HOSPICE | End: 2023-01-01
Attending: EMERGENCY MEDICINE | Admitting: HOSPITALIST
Payer: MEDICARE

## 2023-01-01 ENCOUNTER — APPOINTMENT (OUTPATIENT)
Dept: GENERAL RADIOLOGY | Facility: HOSPITAL | Age: 88
End: 2023-01-01
Attending: EMERGENCY MEDICINE
Payer: MEDICARE

## 2023-01-01 ENCOUNTER — APPOINTMENT (OUTPATIENT)
Dept: GENERAL RADIOLOGY | Facility: HOSPITAL | Age: 88
End: 2023-01-01
Attending: ORTHOPAEDIC SURGERY
Payer: MEDICARE

## 2023-01-01 ENCOUNTER — APPOINTMENT (OUTPATIENT)
Dept: CT IMAGING | Facility: HOSPITAL | Age: 88
End: 2023-01-01
Attending: EMERGENCY MEDICINE
Payer: MEDICARE

## 2023-01-01 ENCOUNTER — HOSPITAL ENCOUNTER (INPATIENT)
Facility: HOSPITAL | Age: 88
LOS: 2 days | End: 2023-01-01
Attending: HOSPITALIST | Admitting: HOSPITALIST
Payer: OTHER MISCELLANEOUS

## 2023-01-01 ENCOUNTER — APPOINTMENT (OUTPATIENT)
Dept: CV DIAGNOSTICS | Facility: HOSPITAL | Age: 88
End: 2023-01-01
Attending: HOSPITALIST
Payer: MEDICARE

## 2023-01-01 VITALS
OXYGEN SATURATION: 98 % | DIASTOLIC BLOOD PRESSURE: 43 MMHG | RESPIRATION RATE: 15 BRPM | WEIGHT: 114.44 LBS | BODY MASS INDEX: 22 KG/M2 | HEART RATE: 63 BPM | SYSTOLIC BLOOD PRESSURE: 81 MMHG | TEMPERATURE: 97 F

## 2023-01-01 VITALS
OXYGEN SATURATION: 84 % | HEART RATE: 72 BPM | TEMPERATURE: 98 F | SYSTOLIC BLOOD PRESSURE: 46 MMHG | RESPIRATION RATE: 14 BRPM | DIASTOLIC BLOOD PRESSURE: 32 MMHG

## 2023-01-01 DIAGNOSIS — S72.002A CLOSED FRACTURE OF LEFT HIP, INITIAL ENCOUNTER (HCC): Primary | ICD-10-CM

## 2023-01-01 LAB
ANION GAP SERPL CALC-SCNC: 5 MMOL/L (ref 0–18)
ANION GAP SERPL CALC-SCNC: 5 MMOL/L (ref 0–18)
ANION GAP SERPL CALC-SCNC: 6 MMOL/L (ref 0–18)
ANION GAP SERPL CALC-SCNC: 8 MMOL/L (ref 0–18)
ANTIBODY SCREEN: NEGATIVE
BASOPHILS # BLD AUTO: 0.04 X10(3) UL (ref 0–0.2)
BASOPHILS # BLD AUTO: 0.07 X10(3) UL (ref 0–0.2)
BASOPHILS NFR BLD AUTO: 0.4 %
BASOPHILS NFR BLD AUTO: 0.7 %
BILIRUB UR QL: NEGATIVE
BUN BLD-MCNC: 25 MG/DL (ref 7–18)
BUN BLD-MCNC: 25 MG/DL (ref 7–18)
BUN BLD-MCNC: 34 MG/DL (ref 7–18)
BUN BLD-MCNC: 46 MG/DL (ref 7–18)
BUN/CREAT SERPL: 20.2 (ref 10–20)
BUN/CREAT SERPL: 22.1 (ref 10–20)
BUN/CREAT SERPL: 22.5 (ref 10–20)
BUN/CREAT SERPL: 30.1 (ref 10–20)
CALCIUM BLD-MCNC: 10.4 MG/DL (ref 8.5–10.1)
CALCIUM BLD-MCNC: 8.4 MG/DL (ref 8.5–10.1)
CALCIUM BLD-MCNC: 8.4 MG/DL (ref 8.5–10.1)
CALCIUM BLD-MCNC: 9.6 MG/DL (ref 8.5–10.1)
CHLORIDE SERPL-SCNC: 110 MMOL/L (ref 98–112)
CHLORIDE SERPL-SCNC: 112 MMOL/L (ref 98–112)
CHLORIDE SERPL-SCNC: 113 MMOL/L (ref 98–112)
CHLORIDE SERPL-SCNC: 121 MMOL/L (ref 98–112)
CLARITY UR: CLEAR
CO2 SERPL-SCNC: 25 MMOL/L (ref 21–32)
CO2 SERPL-SCNC: 26 MMOL/L (ref 21–32)
CO2 SERPL-SCNC: 28 MMOL/L (ref 21–32)
CO2 SERPL-SCNC: 28 MMOL/L (ref 21–32)
CREAT BLD-MCNC: 1.11 MG/DL
CREAT BLD-MCNC: 1.24 MG/DL
CREAT BLD-MCNC: 1.53 MG/DL
CREAT BLD-MCNC: 1.54 MG/DL
D DIMER PPP FEU-MCNC: 6.53 UG/ML FEU (ref ?–0.93)
DEPRECATED RDW RBC AUTO: 40.7 FL (ref 35.1–46.3)
DEPRECATED RDW RBC AUTO: 43.8 FL (ref 35.1–46.3)
DEPRECATED RDW RBC AUTO: 43.9 FL (ref 35.1–46.3)
DEPRECATED RDW RBC AUTO: 44.5 FL (ref 35.1–46.3)
EOSINOPHIL # BLD AUTO: 0.01 X10(3) UL (ref 0–0.7)
EOSINOPHIL # BLD AUTO: 0.07 X10(3) UL (ref 0–0.7)
EOSINOPHIL NFR BLD AUTO: 0.1 %
EOSINOPHIL NFR BLD AUTO: 0.7 %
ERYTHROCYTE [DISTWIDTH] IN BLOOD BY AUTOMATED COUNT: 11.7 % (ref 11–15)
ERYTHROCYTE [DISTWIDTH] IN BLOOD BY AUTOMATED COUNT: 12 % (ref 11–15)
ERYTHROCYTE [DISTWIDTH] IN BLOOD BY AUTOMATED COUNT: 12.1 % (ref 11–15)
ERYTHROCYTE [DISTWIDTH] IN BLOOD BY AUTOMATED COUNT: 12.4 % (ref 11–15)
GFR SERPLBLD BASED ON 1.73 SQ M-ARVRAT: 31 ML/MIN/1.73M2 (ref 60–?)
GFR SERPLBLD BASED ON 1.73 SQ M-ARVRAT: 32 ML/MIN/1.73M2 (ref 60–?)
GFR SERPLBLD BASED ON 1.73 SQ M-ARVRAT: 41 ML/MIN/1.73M2 (ref 60–?)
GFR SERPLBLD BASED ON 1.73 SQ M-ARVRAT: 46 ML/MIN/1.73M2 (ref 60–?)
GLUCOSE BLD-MCNC: 122 MG/DL (ref 70–99)
GLUCOSE BLD-MCNC: 133 MG/DL (ref 70–99)
GLUCOSE BLD-MCNC: 169 MG/DL (ref 70–99)
GLUCOSE BLD-MCNC: 205 MG/DL (ref 70–99)
GLUCOSE BLDC GLUCOMTR-MCNC: 101 MG/DL (ref 70–99)
GLUCOSE BLDC GLUCOMTR-MCNC: 112 MG/DL (ref 70–99)
GLUCOSE BLDC GLUCOMTR-MCNC: 120 MG/DL (ref 70–99)
GLUCOSE BLDC GLUCOMTR-MCNC: 124 MG/DL (ref 70–99)
GLUCOSE BLDC GLUCOMTR-MCNC: 131 MG/DL (ref 70–99)
GLUCOSE BLDC GLUCOMTR-MCNC: 155 MG/DL (ref 70–99)
GLUCOSE BLDC GLUCOMTR-MCNC: 163 MG/DL (ref 70–99)
GLUCOSE BLDC GLUCOMTR-MCNC: 165 MG/DL (ref 70–99)
GLUCOSE BLDC GLUCOMTR-MCNC: 184 MG/DL (ref 70–99)
GLUCOSE BLDC GLUCOMTR-MCNC: 184 MG/DL (ref 70–99)
GLUCOSE BLDC GLUCOMTR-MCNC: 188 MG/DL (ref 70–99)
GLUCOSE BLDC GLUCOMTR-MCNC: 209 MG/DL (ref 70–99)
GLUCOSE BLDC GLUCOMTR-MCNC: 65 MG/DL (ref 70–99)
GLUCOSE BLDC GLUCOMTR-MCNC: 78 MG/DL (ref 70–99)
GLUCOSE BLDC GLUCOMTR-MCNC: 84 MG/DL (ref 70–99)
GLUCOSE BLDC GLUCOMTR-MCNC: 88 MG/DL (ref 70–99)
GLUCOSE UR-MCNC: >1000 MG/DL
HCT VFR BLD AUTO: 28.6 %
HCT VFR BLD AUTO: 28.6 %
HCT VFR BLD AUTO: 29 %
HCT VFR BLD AUTO: 37 %
HCT VFR BLD AUTO: 38.1 %
HGB BLD-MCNC: 12.2 G/DL
HGB BLD-MCNC: 12.4 G/DL
HGB BLD-MCNC: 9.2 G/DL
HGB BLD-MCNC: 9.3 G/DL
HGB BLD-MCNC: 9.3 G/DL
IMM GRANULOCYTES # BLD AUTO: 0.02 X10(3) UL (ref 0–1)
IMM GRANULOCYTES # BLD AUTO: 0.03 X10(3) UL (ref 0–1)
IMM GRANULOCYTES NFR BLD: 0.2 %
IMM GRANULOCYTES NFR BLD: 0.3 %
INR BLD: 1.18 (ref 0.85–1.16)
IRON SATN MFR SERPL: 7 %
IRON SERPL-MCNC: 12 UG/DL
KETONES UR-MCNC: 40 MG/DL
LEUKOCYTE ESTERASE UR QL STRIP.AUTO: 250
LYMPHOCYTES # BLD AUTO: 0.75 X10(3) UL (ref 1–4)
LYMPHOCYTES # BLD AUTO: 1.41 X10(3) UL (ref 1–4)
LYMPHOCYTES NFR BLD AUTO: 13.7 %
LYMPHOCYTES NFR BLD AUTO: 6.9 %
MAGNESIUM SERPL-MCNC: 2.1 MG/DL (ref 1.6–2.6)
MCH RBC QN AUTO: 31.4 PG (ref 26–34)
MCH RBC QN AUTO: 31.6 PG (ref 26–34)
MCH RBC QN AUTO: 31.7 PG (ref 26–34)
MCH RBC QN AUTO: 31.7 PG (ref 26–34)
MCHC RBC AUTO-ENTMCNC: 31.7 G/DL (ref 31–37)
MCHC RBC AUTO-ENTMCNC: 32 G/DL (ref 31–37)
MCHC RBC AUTO-ENTMCNC: 32.5 G/DL (ref 31–37)
MCHC RBC AUTO-ENTMCNC: 33.5 G/DL (ref 31–37)
MCV RBC AUTO: 100 FL
MCV RBC AUTO: 93.7 FL
MCV RBC AUTO: 97.6 FL
MCV RBC AUTO: 98.7 FL
MONOCYTES # BLD AUTO: 0.61 X10(3) UL (ref 0.1–1)
MONOCYTES # BLD AUTO: 0.7 X10(3) UL (ref 0.1–1)
MONOCYTES NFR BLD AUTO: 5.6 %
MONOCYTES NFR BLD AUTO: 6.8 %
MRSA NASAL: NEGATIVE
NEUTROPHILS # BLD AUTO: 8.05 X10 (3) UL (ref 1.5–7.7)
NEUTROPHILS # BLD AUTO: 8.05 X10(3) UL (ref 1.5–7.7)
NEUTROPHILS # BLD AUTO: 9.37 X10 (3) UL (ref 1.5–7.7)
NEUTROPHILS # BLD AUTO: 9.37 X10(3) UL (ref 1.5–7.7)
NEUTROPHILS NFR BLD AUTO: 77.9 %
NEUTROPHILS NFR BLD AUTO: 86.7 %
NITRITE UR QL STRIP.AUTO: NEGATIVE
OSMOLALITY SERPL CALC.SUM OF ELEC: 304 MOSM/KG (ref 275–295)
OSMOLALITY SERPL CALC.SUM OF ELEC: 316 MOSM/KG (ref 275–295)
OSMOLALITY SERPL CALC.SUM OF ELEC: 317 MOSM/KG (ref 275–295)
OSMOLALITY SERPL CALC.SUM OF ELEC: 318 MOSM/KG (ref 275–295)
PH UR: 5.5 [PH] (ref 5–8)
PHOSPHATE SERPL-MCNC: 1.7 MG/DL (ref 2.5–4.9)
PHOSPHATE SERPL-MCNC: 2.3 MG/DL (ref 2.5–4.9)
PLATELET # BLD AUTO: 103 10(3)UL (ref 150–450)
PLATELET # BLD AUTO: 125 10(3)UL (ref 150–450)
PLATELET # BLD AUTO: 144 10(3)UL (ref 150–450)
PLATELET # BLD AUTO: 98 10(3)UL (ref 150–450)
POTASSIUM SERPL-SCNC: 3.4 MMOL/L (ref 3.5–5.1)
POTASSIUM SERPL-SCNC: 3.6 MMOL/L (ref 3.5–5.1)
POTASSIUM SERPL-SCNC: 3.9 MMOL/L (ref 3.5–5.1)
POTASSIUM SERPL-SCNC: 4.1 MMOL/L (ref 3.5–5.1)
POTASSIUM SERPL-SCNC: 4.1 MMOL/L (ref 3.5–5.1)
PROT UR-MCNC: 30 MG/DL
PROTHROMBIN TIME: 14.9 SECONDS (ref 11.6–14.8)
RBC # BLD AUTO: 2.9 X10(6)UL
RBC # BLD AUTO: 2.93 X10(6)UL
RBC # BLD AUTO: 3.86 X10(6)UL
RBC # BLD AUTO: 3.95 X10(6)UL
RBC #/AREA URNS AUTO: >10 /HPF
RH BLOOD TYPE: POSITIVE
SODIUM SERPL-SCNC: 143 MMOL/L (ref 136–145)
SODIUM SERPL-SCNC: 144 MMOL/L (ref 136–145)
SODIUM SERPL-SCNC: 149 MMOL/L (ref 136–145)
SODIUM SERPL-SCNC: 151 MMOL/L (ref 136–145)
SP GR UR STRIP: 1.02 (ref 1–1.03)
STAPH A BY PCR: NEGATIVE
TIBC SERPL-MCNC: 177 UG/DL (ref 240–450)
TRANSFERRIN SERPL-MCNC: 119 MG/DL (ref 200–360)
TSI SER-ACNC: 3.66 MIU/ML (ref 0.36–3.74)
UROBILINOGEN UR STRIP-ACNC: NORMAL
VIT B12 SERPL-MCNC: 1959 PG/ML (ref 193–986)
WBC # BLD AUTO: 10.3 X10(3) UL (ref 4–11)
WBC # BLD AUTO: 10.8 X10(3) UL (ref 4–11)
WBC # BLD AUTO: 6.7 X10(3) UL (ref 4–11)
WBC # BLD AUTO: 8.7 X10(3) UL (ref 4–11)

## 2023-01-01 PROCEDURE — 70450 CT HEAD/BRAIN W/O DYE: CPT | Performed by: EMERGENCY MEDICINE

## 2023-01-01 PROCEDURE — 99222 1ST HOSP IP/OBS MODERATE 55: CPT | Performed by: HOSPITALIST

## 2023-01-01 PROCEDURE — 99239 HOSP IP/OBS DSCHRG MGMT >30: CPT | Performed by: HOSPITALIST

## 2023-01-01 PROCEDURE — 0QS736Z REPOSITION LEFT UPPER FEMUR WITH INTRAMEDULLARY INTERNAL FIXATION DEVICE, PERCUTANEOUS APPROACH: ICD-10-PCS | Performed by: ORTHOPAEDIC SURGERY

## 2023-01-01 PROCEDURE — 71045 X-RAY EXAM CHEST 1 VIEW: CPT | Performed by: EMERGENCY MEDICINE

## 2023-01-01 PROCEDURE — 99233 SBSQ HOSP IP/OBS HIGH 50: CPT | Performed by: NURSE PRACTITIONER

## 2023-01-01 PROCEDURE — 99233 SBSQ HOSP IP/OBS HIGH 50: CPT | Performed by: HOSPITALIST

## 2023-01-01 PROCEDURE — 90792 PSYCH DIAG EVAL W/MED SRVCS: CPT | Performed by: OTHER

## 2023-01-01 PROCEDURE — 76000 FLUOROSCOPY <1 HR PHYS/QHP: CPT | Performed by: ORTHOPAEDIC SURGERY

## 2023-01-01 PROCEDURE — 99232 SBSQ HOSP IP/OBS MODERATE 35: CPT | Performed by: NURSE PRACTITIONER

## 2023-01-01 PROCEDURE — 71260 CT THORAX DX C+: CPT | Performed by: EMERGENCY MEDICINE

## 2023-01-01 PROCEDURE — 99223 1ST HOSP IP/OBS HIGH 75: CPT | Performed by: HOSPITALIST

## 2023-01-01 PROCEDURE — 73502 X-RAY EXAM HIP UNI 2-3 VIEWS: CPT | Performed by: EMERGENCY MEDICINE

## 2023-01-01 DEVICE — BLADE INTRA NL HELI 10.35MM 90: Type: IMPLANTABLE DEVICE | Site: LEG | Status: FUNCTIONAL

## 2023-01-01 DEVICE — SCREW BN 5MM 4.3MM 38MM NLEX: Type: IMPLANTABLE DEVICE | Site: LEG | Status: FUNCTIONAL

## 2023-01-01 DEVICE — IMPLANTABLE DEVICE: Type: IMPLANTABLE DEVICE | Site: LEG | Status: FUNCTIONAL

## 2023-01-01 RX ORDER — HYDROMORPHONE HYDROCHLORIDE 1 MG/ML
0.2 INJECTION, SOLUTION INTRAMUSCULAR; INTRAVENOUS; SUBCUTANEOUS EVERY 5 MIN PRN
Status: DISCONTINUED | OUTPATIENT
Start: 2023-01-01 | End: 2023-01-01 | Stop reason: HOSPADM

## 2023-01-01 RX ORDER — CEFAZOLIN SODIUM/WATER 2 G/20 ML
2 SYRINGE (ML) INTRAVENOUS
Status: COMPLETED | OUTPATIENT
Start: 2023-01-01 | End: 2023-01-01

## 2023-01-01 RX ORDER — ONDANSETRON 2 MG/ML
4 INJECTION INTRAMUSCULAR; INTRAVENOUS EVERY 6 HOURS PRN
Status: DISCONTINUED | OUTPATIENT
Start: 2023-01-01 | End: 2023-01-01 | Stop reason: HOSPADM

## 2023-01-01 RX ORDER — SODIUM CHLORIDE, SODIUM LACTATE, POTASSIUM CHLORIDE, CALCIUM CHLORIDE 600; 310; 30; 20 MG/100ML; MG/100ML; MG/100ML; MG/100ML
INJECTION, SOLUTION INTRAVENOUS CONTINUOUS
Status: DISCONTINUED | OUTPATIENT
Start: 2023-01-01 | End: 2023-01-01 | Stop reason: HOSPADM

## 2023-01-01 RX ORDER — LISINOPRIL 20 MG/1
20 TABLET ORAL DAILY
Status: DISCONTINUED | OUTPATIENT
Start: 2023-01-01 | End: 2023-01-01

## 2023-01-01 RX ORDER — DULOXETIN HYDROCHLORIDE 30 MG/1
30 CAPSULE, DELAYED RELEASE ORAL DAILY
Status: ON HOLD | COMMUNITY
End: 2023-01-01

## 2023-01-01 RX ORDER — ACETAMINOPHEN 650 MG/1
650 SUPPOSITORY RECTAL EVERY 6 HOURS PRN
Status: DISCONTINUED | OUTPATIENT
Start: 2023-01-01 | End: 2023-01-01

## 2023-01-01 RX ORDER — LEVOTHYROXINE SODIUM 0.07 MG/1
75 TABLET ORAL
Status: DISCONTINUED | OUTPATIENT
Start: 2023-01-01 | End: 2023-01-01

## 2023-01-01 RX ORDER — HALOPERIDOL 5 MG/ML
0.5 INJECTION INTRAMUSCULAR EVERY 4 HOURS PRN
Status: DISCONTINUED | OUTPATIENT
Start: 2023-01-01 | End: 2023-01-01

## 2023-01-01 RX ORDER — OLANZAPINE 5 MG/1
5 TABLET, ORALLY DISINTEGRATING ORAL DAILY
Status: DISCONTINUED | OUTPATIENT
Start: 2023-01-01 | End: 2023-01-01

## 2023-01-01 RX ORDER — MORPHINE SULFATE 10 MG/ML
6 INJECTION, SOLUTION INTRAMUSCULAR; INTRAVENOUS EVERY 10 MIN PRN
Status: DISCONTINUED | OUTPATIENT
Start: 2023-01-01 | End: 2023-01-01 | Stop reason: HOSPADM

## 2023-01-01 RX ORDER — MORPHINE SULFATE 4 MG/ML
4 INJECTION, SOLUTION INTRAMUSCULAR; INTRAVENOUS EVERY 10 MIN PRN
Status: DISCONTINUED | OUTPATIENT
Start: 2023-01-01 | End: 2023-01-01 | Stop reason: HOSPADM

## 2023-01-01 RX ORDER — SODIUM CHLORIDE 450 MG/100ML
INJECTION, SOLUTION INTRAVENOUS CONTINUOUS
Status: DISCONTINUED | OUTPATIENT
Start: 2023-01-01 | End: 2023-01-01

## 2023-01-01 RX ORDER — HYDROCHLOROTHIAZIDE 25 MG/1
25 TABLET ORAL DAILY
Status: DISCONTINUED | OUTPATIENT
Start: 2023-01-01 | End: 2023-01-01

## 2023-01-01 RX ORDER — SODIUM CHLORIDE 9 MG/ML
INJECTION, SOLUTION INTRAVENOUS CONTINUOUS
Status: DISCONTINUED | OUTPATIENT
Start: 2023-01-01 | End: 2023-01-01

## 2023-01-01 RX ORDER — MORPHINE SULFATE 2 MG/ML
2 INJECTION, SOLUTION INTRAMUSCULAR; INTRAVENOUS ONCE
Status: COMPLETED | OUTPATIENT
Start: 2023-01-01 | End: 2023-01-01

## 2023-01-01 RX ORDER — MORPHINE SULFATE 4 MG/ML
2 INJECTION, SOLUTION INTRAMUSCULAR; INTRAVENOUS EVERY 10 MIN PRN
Status: DISCONTINUED | OUTPATIENT
Start: 2023-01-01 | End: 2023-01-01 | Stop reason: HOSPADM

## 2023-01-01 RX ORDER — CHLORPROMAZINE HYDROCHLORIDE 25 MG/ML
25 INJECTION INTRAMUSCULAR EVERY 6 HOURS PRN
Status: CANCELLED | OUTPATIENT
Start: 2023-01-01

## 2023-01-01 RX ORDER — MORPHINE SULFATE 2 MG/ML
2 INJECTION, SOLUTION INTRAMUSCULAR; INTRAVENOUS EVERY 2 HOUR PRN
Status: DISCONTINUED | OUTPATIENT
Start: 2023-01-01 | End: 2023-01-01

## 2023-01-01 RX ORDER — DILTIAZEM HYDROCHLORIDE 5 MG/ML
10 INJECTION INTRAVENOUS EVERY 2 HOUR PRN
Status: DISCONTINUED | OUTPATIENT
Start: 2023-01-01 | End: 2023-01-01

## 2023-01-01 RX ORDER — ATROPINE SULFATE 10 MG/ML
2 SOLUTION/ DROPS OPHTHALMIC EVERY 2 HOUR PRN
Status: DISCONTINUED | OUTPATIENT
Start: 2023-01-01 | End: 2023-01-01

## 2023-01-01 RX ORDER — MORPHINE SULFATE 2 MG/ML
1 INJECTION, SOLUTION INTRAMUSCULAR; INTRAVENOUS EVERY 2 HOUR PRN
Status: DISCONTINUED | OUTPATIENT
Start: 2023-01-01 | End: 2023-01-01

## 2023-01-01 RX ORDER — DIGOXIN 0.25 MG/ML
125 INJECTION INTRAMUSCULAR; INTRAVENOUS ONCE
Status: COMPLETED | OUTPATIENT
Start: 2023-01-01 | End: 2023-01-01

## 2023-01-01 RX ORDER — INSULIN GLARGINE 100 [IU]/ML
18 INJECTION, SOLUTION SUBCUTANEOUS NIGHTLY
Status: ON HOLD | COMMUNITY
End: 2023-01-01

## 2023-01-01 RX ORDER — HALOPERIDOL 5 MG/ML
1 INJECTION INTRAMUSCULAR
Status: DISCONTINUED | OUTPATIENT
Start: 2023-01-01 | End: 2023-01-01

## 2023-01-01 RX ORDER — MORPHINE SULFATE 4 MG/ML
4 INJECTION, SOLUTION INTRAMUSCULAR; INTRAVENOUS EVERY 2 HOUR PRN
Status: DISCONTINUED | OUTPATIENT
Start: 2023-01-01 | End: 2023-01-01

## 2023-01-01 RX ORDER — HEPARIN SODIUM 5000 [USP'U]/ML
5000 INJECTION, SOLUTION INTRAVENOUS; SUBCUTANEOUS EVERY 12 HOURS SCHEDULED
Status: DISCONTINUED | OUTPATIENT
Start: 2023-01-01 | End: 2023-01-01

## 2023-01-01 RX ORDER — NICOTINE POLACRILEX 4 MG
30 LOZENGE BUCCAL
Status: DISCONTINUED | OUTPATIENT
Start: 2023-01-01 | End: 2023-01-01

## 2023-01-01 RX ORDER — HYDROCODONE BITARTRATE AND ACETAMINOPHEN 5; 325 MG/1; MG/1
1 TABLET ORAL EVERY 6 HOURS PRN
Status: DISCONTINUED | OUTPATIENT
Start: 2023-01-01 | End: 2023-01-01

## 2023-01-01 RX ORDER — HALOPERIDOL 5 MG/ML
2 INJECTION INTRAMUSCULAR
Status: DISCONTINUED | OUTPATIENT
Start: 2023-01-01 | End: 2023-01-01

## 2023-01-01 RX ORDER — SODIUM CHLORIDE 0.9 % (FLUSH) 0.9 %
10 SYRINGE (ML) INJECTION AS NEEDED
Status: DISCONTINUED | OUTPATIENT
Start: 2023-01-01 | End: 2023-01-01

## 2023-01-01 RX ORDER — HYDROMORPHONE HYDROCHLORIDE 1 MG/ML
0.4 INJECTION, SOLUTION INTRAMUSCULAR; INTRAVENOUS; SUBCUTANEOUS EVERY 5 MIN PRN
Status: DISCONTINUED | OUTPATIENT
Start: 2023-01-01 | End: 2023-01-01 | Stop reason: HOSPADM

## 2023-01-01 RX ORDER — CHLORPROMAZINE HYDROCHLORIDE 25 MG/ML
25 INJECTION INTRAMUSCULAR EVERY 6 HOURS PRN
Status: DISCONTINUED | OUTPATIENT
Start: 2023-01-01 | End: 2023-01-01

## 2023-01-01 RX ORDER — TRANEXAMIC ACID 10 MG/ML
1000 INJECTION, SOLUTION INTRAVENOUS
Status: DISCONTINUED | OUTPATIENT
Start: 2023-01-01 | End: 2023-01-01 | Stop reason: HOSPADM

## 2023-01-01 RX ORDER — DILTIAZEM HYDROCHLORIDE 5 MG/ML
10 INJECTION INTRAVENOUS ONCE
Status: COMPLETED | OUTPATIENT
Start: 2023-01-01 | End: 2023-01-01

## 2023-01-01 RX ORDER — ACETAMINOPHEN 10 MG/ML
1000 INJECTION, SOLUTION INTRAVENOUS EVERY 8 HOURS
Status: DISCONTINUED | OUTPATIENT
Start: 2023-01-01 | End: 2023-01-01

## 2023-01-01 RX ORDER — ONDANSETRON 2 MG/ML
4 INJECTION INTRAMUSCULAR; INTRAVENOUS EVERY 6 HOURS PRN
Status: DISCONTINUED | OUTPATIENT
Start: 2023-01-01 | End: 2023-01-01

## 2023-01-01 RX ORDER — DEXTROSE MONOHYDRATE 50 MG/ML
INJECTION, SOLUTION INTRAVENOUS CONTINUOUS
Status: DISCONTINUED | OUTPATIENT
Start: 2023-01-01 | End: 2023-01-01

## 2023-01-01 RX ORDER — NALOXONE HYDROCHLORIDE 0.4 MG/ML
80 INJECTION, SOLUTION INTRAMUSCULAR; INTRAVENOUS; SUBCUTANEOUS AS NEEDED
Status: DISCONTINUED | OUTPATIENT
Start: 2023-01-01 | End: 2023-01-01 | Stop reason: HOSPADM

## 2023-01-01 RX ORDER — SENNOSIDES 8.6 MG
17.2 TABLET ORAL NIGHTLY
Status: DISCONTINUED | OUTPATIENT
Start: 2023-01-01 | End: 2023-01-01

## 2023-01-01 RX ORDER — FUROSEMIDE 10 MG/ML
40 INJECTION INTRAMUSCULAR; INTRAVENOUS EVERY 8 HOURS PRN
Status: DISCONTINUED | OUTPATIENT
Start: 2023-01-01 | End: 2023-01-01

## 2023-01-01 RX ORDER — DOXEPIN HYDROCHLORIDE 50 MG/1
1 CAPSULE ORAL DAILY
Status: DISCONTINUED | OUTPATIENT
Start: 2023-01-01 | End: 2023-01-01

## 2023-01-01 RX ORDER — LORAZEPAM 2 MG/ML
2 INJECTION INTRAMUSCULAR EVERY 4 HOURS PRN
Status: DISCONTINUED | OUTPATIENT
Start: 2023-01-01 | End: 2023-01-01

## 2023-01-01 RX ORDER — NICOTINE POLACRILEX 4 MG
15 LOZENGE BUCCAL
Status: DISCONTINUED | OUTPATIENT
Start: 2023-01-01 | End: 2023-01-01

## 2023-01-01 RX ORDER — MORPHINE SULFATE 20 MG/ML
5 SOLUTION ORAL
Status: DISCONTINUED | OUTPATIENT
Start: 2023-01-01 | End: 2023-01-01

## 2023-01-01 RX ORDER — DEXTROSE MONOHYDRATE 25 G/50ML
50 INJECTION, SOLUTION INTRAVENOUS
Status: DISCONTINUED | OUTPATIENT
Start: 2023-01-01 | End: 2023-01-01

## 2023-01-01 RX ORDER — METOCLOPRAMIDE HYDROCHLORIDE 5 MG/ML
5 INJECTION INTRAMUSCULAR; INTRAVENOUS EVERY 8 HOURS PRN
Status: DISCONTINUED | OUTPATIENT
Start: 2023-01-01 | End: 2023-01-01

## 2023-01-01 RX ORDER — POTASSIUM CHLORIDE 20 MEQ/1
40 TABLET, EXTENDED RELEASE ORAL ONCE
Status: COMPLETED | OUTPATIENT
Start: 2023-01-01 | End: 2023-01-01

## 2023-01-01 RX ORDER — DILTIAZEM HYDROCHLORIDE 5 MG/ML
INJECTION INTRAVENOUS
Status: COMPLETED
Start: 2023-01-01 | End: 2023-01-01

## 2023-01-01 RX ORDER — HALOPERIDOL 5 MG/ML
0.5 INJECTION INTRAMUSCULAR EVERY 2 HOUR PRN
Status: DISCONTINUED | OUTPATIENT
Start: 2023-01-01 | End: 2023-01-01

## 2023-01-01 RX ORDER — ACETAMINOPHEN 500 MG
500 TABLET ORAL EVERY 4 HOURS PRN
Status: DISCONTINUED | OUTPATIENT
Start: 2023-01-01 | End: 2023-01-01

## 2023-01-01 RX ORDER — DILTIAZEM HYDROCHLORIDE 240 MG/1
240 CAPSULE, COATED, EXTENDED RELEASE ORAL DAILY
Status: DISCONTINUED | OUTPATIENT
Start: 2023-01-01 | End: 2023-01-01

## 2023-01-01 RX ORDER — MORPHINE SULFATE 2 MG/ML
1 INJECTION, SOLUTION INTRAMUSCULAR; INTRAVENOUS
Status: DISCONTINUED | OUTPATIENT
Start: 2023-01-01 | End: 2023-01-01

## 2023-01-01 RX ORDER — AMIODARONE HYDROCHLORIDE 200 MG/1
200 TABLET ORAL 2 TIMES DAILY WITH MEALS
Status: DISCONTINUED | OUTPATIENT
Start: 2023-01-01 | End: 2023-01-01

## 2023-01-01 RX ORDER — CEFAZOLIN SODIUM/WATER 2 G/20 ML
2 SYRINGE (ML) INTRAVENOUS EVERY 8 HOURS
Status: COMPLETED | OUTPATIENT
Start: 2023-01-01 | End: 2023-01-01

## 2023-01-01 RX ORDER — LORAZEPAM 2 MG/ML
1 CONCENTRATE ORAL EVERY 4 HOURS PRN
Status: DISCONTINUED | OUTPATIENT
Start: 2023-01-01 | End: 2023-01-01

## 2023-01-01 RX ORDER — SCOLOPAMINE TRANSDERMAL SYSTEM 1 MG/1
1 PATCH, EXTENDED RELEASE TRANSDERMAL
Status: DISCONTINUED | OUTPATIENT
Start: 2023-01-01 | End: 2023-01-01

## 2023-01-01 RX ORDER — HYDROMORPHONE HYDROCHLORIDE 1 MG/ML
0.6 INJECTION, SOLUTION INTRAMUSCULAR; INTRAVENOUS; SUBCUTANEOUS EVERY 5 MIN PRN
Status: DISCONTINUED | OUTPATIENT
Start: 2023-01-01 | End: 2023-01-01 | Stop reason: HOSPADM

## 2023-01-01 RX ORDER — BISACODYL 10 MG
10 SUPPOSITORY, RECTAL RECTAL
Status: DISCONTINUED | OUTPATIENT
Start: 2023-01-01 | End: 2023-01-01

## 2023-01-01 RX ORDER — LORAZEPAM 2 MG/ML
1 INJECTION INTRAMUSCULAR EVERY 4 HOURS PRN
Status: DISCONTINUED | OUTPATIENT
Start: 2023-01-01 | End: 2023-01-01

## 2023-01-01 RX ORDER — GLYCOPYRROLATE 0.2 MG/ML
0.4 INJECTION, SOLUTION INTRAMUSCULAR; INTRAVENOUS
Status: DISCONTINUED | OUTPATIENT
Start: 2023-01-01 | End: 2023-01-01

## 2023-02-12 NOTE — TELEPHONE ENCOUNTER
[phone]  Pt confused how much insulin she just took. Pt was distracted on ph when she was doing insulin. Not sure what to do.  Please advise  To clinical high Patient informed/Family informed/TV

## 2023-02-13 ENCOUNTER — OFFICE VISIT (OUTPATIENT)
Dept: ENDOCRINOLOGY CLINIC | Facility: CLINIC | Age: 88
End: 2023-02-13

## 2023-02-13 VITALS — SYSTOLIC BLOOD PRESSURE: 136 MMHG | DIASTOLIC BLOOD PRESSURE: 63 MMHG | HEART RATE: 55 BPM

## 2023-02-13 DIAGNOSIS — E11.65 UNCONTROLLED TYPE 2 DIABETES MELLITUS WITH HYPERGLYCEMIA (HCC): Primary | ICD-10-CM

## 2023-02-13 LAB
CARTRIDGE LOT#: ABNORMAL NUMERIC
GLUCOSE BLOOD: 246
HEMOGLOBIN A1C: 7.2 % (ref 4.3–5.6)
TEST STRIP LOT #: NORMAL NUMERIC

## 2023-02-13 PROCEDURE — 83036 HEMOGLOBIN GLYCOSYLATED A1C: CPT | Performed by: INTERNAL MEDICINE

## 2023-02-13 PROCEDURE — 82947 ASSAY GLUCOSE BLOOD QUANT: CPT | Performed by: INTERNAL MEDICINE

## 2023-02-13 PROCEDURE — 1126F AMNT PAIN NOTED NONE PRSNT: CPT | Performed by: INTERNAL MEDICINE

## 2023-02-13 PROCEDURE — 99213 OFFICE O/P EST LOW 20 MIN: CPT | Performed by: INTERNAL MEDICINE

## 2023-02-16 ENCOUNTER — LAB ENCOUNTER (OUTPATIENT)
Dept: LAB | Age: 88
End: 2023-02-16
Attending: INTERNAL MEDICINE
Payer: MEDICARE

## 2023-02-16 ENCOUNTER — OFFICE VISIT (OUTPATIENT)
Dept: INTERNAL MEDICINE CLINIC | Facility: CLINIC | Age: 88
End: 2023-02-16

## 2023-02-16 VITALS
DIASTOLIC BLOOD PRESSURE: 60 MMHG | TEMPERATURE: 98 F | BODY MASS INDEX: 22.63 KG/M2 | HEART RATE: 60 BPM | SYSTOLIC BLOOD PRESSURE: 126 MMHG | HEIGHT: 62 IN | WEIGHT: 123 LBS | OXYGEN SATURATION: 99 %

## 2023-02-16 DIAGNOSIS — R53.83 FATIGUE, UNSPECIFIED TYPE: ICD-10-CM

## 2023-02-16 DIAGNOSIS — I10 ESSENTIAL HYPERTENSION: ICD-10-CM

## 2023-02-16 DIAGNOSIS — I73.9 PERIPHERAL VASCULAR DISEASE (HCC): ICD-10-CM

## 2023-02-16 DIAGNOSIS — E11.42 TYPE 2 DIABETES MELLITUS WITH DIABETIC POLYNEUROPATHY, WITH LONG-TERM CURRENT USE OF INSULIN (HCC): ICD-10-CM

## 2023-02-16 DIAGNOSIS — K21.9 GASTROESOPHAGEAL REFLUX DISEASE WITHOUT ESOPHAGITIS: ICD-10-CM

## 2023-02-16 DIAGNOSIS — Z79.4 TYPE 2 DIABETES MELLITUS WITH DIABETIC POLYNEUROPATHY, WITH LONG-TERM CURRENT USE OF INSULIN (HCC): ICD-10-CM

## 2023-02-16 DIAGNOSIS — N18.32 STAGE 3B CHRONIC KIDNEY DISEASE (HCC): ICD-10-CM

## 2023-02-16 DIAGNOSIS — I25.10 ASHD (ARTERIOSCLEROTIC HEART DISEASE): ICD-10-CM

## 2023-02-16 DIAGNOSIS — Z79.4 TYPE 2 DIABETES MELLITUS WITH STAGE 3B CHRONIC KIDNEY DISEASE, WITH LONG-TERM CURRENT USE OF INSULIN (HCC): ICD-10-CM

## 2023-02-16 DIAGNOSIS — Z79.4 TYPE 2 DIABETES MELLITUS WITH DIABETIC POLYNEUROPATHY, WITH LONG-TERM CURRENT USE OF INSULIN (HCC): Primary | ICD-10-CM

## 2023-02-16 DIAGNOSIS — N18.32 TYPE 2 DIABETES MELLITUS WITH STAGE 3B CHRONIC KIDNEY DISEASE, WITH LONG-TERM CURRENT USE OF INSULIN (HCC): ICD-10-CM

## 2023-02-16 DIAGNOSIS — G62.9 PERIPHERAL POLYNEUROPATHY: ICD-10-CM

## 2023-02-16 DIAGNOSIS — C50.911 MALIGNANT NEOPLASM OF RIGHT FEMALE BREAST, UNSPECIFIED ESTROGEN RECEPTOR STATUS, UNSPECIFIED SITE OF BREAST (HCC): ICD-10-CM

## 2023-02-16 DIAGNOSIS — E03.9 HYPOTHYROIDISM, UNSPECIFIED TYPE: ICD-10-CM

## 2023-02-16 DIAGNOSIS — E78.00 HYPERCHOLESTEROLEMIA: ICD-10-CM

## 2023-02-16 DIAGNOSIS — Z95.1 S/P CABG X 3: ICD-10-CM

## 2023-02-16 DIAGNOSIS — E11.22 TYPE 2 DIABETES MELLITUS WITH STAGE 3B CHRONIC KIDNEY DISEASE, WITH LONG-TERM CURRENT USE OF INSULIN (HCC): ICD-10-CM

## 2023-02-16 DIAGNOSIS — E55.9 VITAMIN D DEFICIENCY: ICD-10-CM

## 2023-02-16 DIAGNOSIS — E11.42 TYPE 2 DIABETES MELLITUS WITH DIABETIC POLYNEUROPATHY, WITH LONG-TERM CURRENT USE OF INSULIN (HCC): Primary | ICD-10-CM

## 2023-02-16 LAB
ALT SERPL-CCNC: 20 U/L
ANION GAP SERPL CALC-SCNC: 3 MMOL/L (ref 0–18)
AST SERPL-CCNC: 20 U/L (ref 15–37)
BASOPHILS # BLD AUTO: 0.08 X10(3) UL (ref 0–0.2)
BASOPHILS NFR BLD AUTO: 1 %
BUN BLD-MCNC: 47 MG/DL (ref 7–18)
BUN/CREAT SERPL: 29.6 (ref 10–20)
CALCIUM BLD-MCNC: 10.6 MG/DL (ref 8.5–10.1)
CHLORIDE SERPL-SCNC: 107 MMOL/L (ref 98–112)
CHOLEST SERPL-MCNC: 164 MG/DL (ref ?–200)
CO2 SERPL-SCNC: 34 MMOL/L (ref 21–32)
CREAT BLD-MCNC: 1.59 MG/DL
DEPRECATED RDW RBC AUTO: 42.9 FL (ref 35.1–46.3)
EOSINOPHIL # BLD AUTO: 0.51 X10(3) UL (ref 0–0.7)
EOSINOPHIL NFR BLD AUTO: 6.2 %
ERYTHROCYTE [DISTWIDTH] IN BLOOD BY AUTOMATED COUNT: 12 % (ref 11–15)
EST. AVERAGE GLUCOSE BLD GHB EST-MCNC: 163 MG/DL (ref 68–126)
FASTING PATIENT LIPID ANSWER: NO
FASTING STATUS PATIENT QL REPORTED: NO
GFR SERPLBLD BASED ON 1.73 SQ M-ARVRAT: 30 ML/MIN/1.73M2 (ref 60–?)
GLUCOSE BLD-MCNC: 173 MG/DL (ref 70–99)
HBA1C MFR BLD: 7.3 % (ref ?–5.7)
HCT VFR BLD AUTO: 40.3 %
HDLC SERPL-MCNC: 49 MG/DL (ref 40–59)
HGB BLD-MCNC: 13.1 G/DL
IMM GRANULOCYTES # BLD AUTO: 0.02 X10(3) UL (ref 0–1)
IMM GRANULOCYTES NFR BLD: 0.2 %
LDLC SERPL CALC-MCNC: 86 MG/DL (ref ?–100)
LYMPHOCYTES # BLD AUTO: 1.7 X10(3) UL (ref 1–4)
LYMPHOCYTES NFR BLD AUTO: 20.7 %
MCH RBC QN AUTO: 31.4 PG (ref 26–34)
MCHC RBC AUTO-ENTMCNC: 32.5 G/DL (ref 31–37)
MCV RBC AUTO: 96.6 FL
MONOCYTES # BLD AUTO: 0.5 X10(3) UL (ref 0.1–1)
MONOCYTES NFR BLD AUTO: 6.1 %
NEUTROPHILS # BLD AUTO: 5.42 X10 (3) UL (ref 1.5–7.7)
NEUTROPHILS # BLD AUTO: 5.42 X10(3) UL (ref 1.5–7.7)
NEUTROPHILS NFR BLD AUTO: 65.8 %
NONHDLC SERPL-MCNC: 115 MG/DL (ref ?–130)
OSMOLALITY SERPL CALC.SUM OF ELEC: 314 MOSM/KG (ref 275–295)
PLATELET # BLD AUTO: 157 10(3)UL (ref 150–450)
POTASSIUM SERPL-SCNC: 4 MMOL/L (ref 3.5–5.1)
RBC # BLD AUTO: 4.17 X10(6)UL
SODIUM SERPL-SCNC: 144 MMOL/L (ref 136–145)
TRIGL SERPL-MCNC: 172 MG/DL (ref 30–149)
VLDLC SERPL CALC-MCNC: 27 MG/DL (ref 0–30)
WBC # BLD AUTO: 8.2 X10(3) UL (ref 4–11)

## 2023-02-16 PROCEDURE — 1126F AMNT PAIN NOTED NONE PRSNT: CPT | Performed by: INTERNAL MEDICINE

## 2023-02-16 PROCEDURE — 84450 TRANSFERASE (AST) (SGOT): CPT

## 2023-02-16 PROCEDURE — 84460 ALANINE AMINO (ALT) (SGPT): CPT

## 2023-02-16 PROCEDURE — 83036 HEMOGLOBIN GLYCOSYLATED A1C: CPT

## 2023-02-16 PROCEDURE — 85025 COMPLETE CBC W/AUTO DIFF WBC: CPT

## 2023-02-16 PROCEDURE — 80048 BASIC METABOLIC PNL TOTAL CA: CPT

## 2023-02-16 PROCEDURE — 36415 COLL VENOUS BLD VENIPUNCTURE: CPT

## 2023-02-16 PROCEDURE — 99214 OFFICE O/P EST MOD 30 MIN: CPT | Performed by: INTERNAL MEDICINE

## 2023-02-16 PROCEDURE — 80061 LIPID PANEL: CPT

## 2023-02-16 NOTE — PATIENT INSTRUCTIONS
Patient is to continue her current diet, medication and activity. Patient is a blood test taken that been previously ordered and are in the system. Patient's son has said that he will make arrangements to get them done in the next few weeks. I will plan to see the patient back in about 3 months. I will place orders in the system to be done prior to that visit after I see the results of the blood they will be done later future. Patient is to continue to follow-up with her endocrinologist, Dr. Coco Ballesteros.

## 2023-02-20 ENCOUNTER — TELEPHONE (OUTPATIENT)
Dept: INTERNAL MEDICINE CLINIC | Facility: CLINIC | Age: 88
End: 2023-02-20

## 2023-02-20 DIAGNOSIS — Z79.4 TYPE 2 DIABETES MELLITUS WITHOUT COMPLICATION, WITH LONG-TERM CURRENT USE OF INSULIN (HCC): ICD-10-CM

## 2023-02-20 DIAGNOSIS — R53.83 FATIGUE, UNSPECIFIED TYPE: Primary | ICD-10-CM

## 2023-02-20 DIAGNOSIS — E78.00 HYPERCHOLESTEROLEMIA: ICD-10-CM

## 2023-02-20 DIAGNOSIS — E11.9 TYPE 2 DIABETES MELLITUS WITHOUT COMPLICATION, WITH LONG-TERM CURRENT USE OF INSULIN (HCC): ICD-10-CM

## 2023-02-20 NOTE — TELEPHONE ENCOUNTER
Please notify pt's daughter that pt's recent blood tests have turned out well. Also, I have placed orders for blood tests to be done prior to seeing me in May. I will route this to nursing.   Thank you!!

## 2023-02-28 RX ORDER — LEVOTHYROXINE SODIUM 0.07 MG/1
75 TABLET ORAL
Qty: 90 TABLET | Refills: 3 | Status: SHIPPED | OUTPATIENT
Start: 2023-02-28

## 2023-03-17 RX ORDER — EMPAGLIFLOZIN 10 MG/1
TABLET, FILM COATED ORAL
Qty: 90 TABLET | Refills: 1 | Status: SHIPPED | OUTPATIENT
Start: 2023-03-17

## 2023-04-03 ENCOUNTER — HOSPITAL ENCOUNTER (EMERGENCY)
Facility: HOSPITAL | Age: 88
Discharge: HOME | End: 2023-04-03
Attending: EMERGENCY MEDICINE
Payer: MEDICARE

## 2023-04-03 ENCOUNTER — APPOINTMENT (OUTPATIENT)
Dept: CT IMAGING | Facility: HOSPITAL | Age: 88
End: 2023-04-03
Attending: EMERGENCY MEDICINE
Payer: MEDICARE

## 2023-04-03 VITALS
HEART RATE: 63 BPM | HEIGHT: 61 IN | BODY MASS INDEX: 30.21 KG/M2 | SYSTOLIC BLOOD PRESSURE: 148 MMHG | DIASTOLIC BLOOD PRESSURE: 56 MMHG | TEMPERATURE: 98 F | RESPIRATION RATE: 17 BRPM | WEIGHT: 160 LBS | OXYGEN SATURATION: 93 %

## 2023-04-03 DIAGNOSIS — R55 SYNCOPE AND COLLAPSE: Primary | ICD-10-CM

## 2023-04-03 LAB
ALBUMIN SERPL-MCNC: 3.5 G/DL (ref 3.4–5)
ALBUMIN/GLOB SERPL: 1 {RATIO} (ref 1–2)
ALP LIVER SERPL-CCNC: 47 U/L
ALT SERPL-CCNC: 20 U/L
ANION GAP SERPL CALC-SCNC: 7 MMOL/L (ref 0–18)
AST SERPL-CCNC: 29 U/L (ref 15–37)
ATRIAL RATE: 62 BPM
BASOPHILS # BLD AUTO: 0.09 X10(3) UL (ref 0–0.2)
BASOPHILS NFR BLD AUTO: 1.5 %
BILIRUB SERPL-MCNC: 0.5 MG/DL (ref 0.1–2)
BILIRUB UR QL: NEGATIVE
BUN BLD-MCNC: 43 MG/DL (ref 7–18)
BUN/CREAT SERPL: 32.3 (ref 10–20)
CALCIUM BLD-MCNC: 10.3 MG/DL (ref 8.5–10.1)
CHLORIDE SERPL-SCNC: 107 MMOL/L (ref 98–112)
CLARITY UR: CLEAR
CO2 SERPL-SCNC: 29 MMOL/L (ref 21–32)
COLOR UR: YELLOW
CREAT BLD-MCNC: 1.33 MG/DL
DEPRECATED RDW RBC AUTO: 43.7 FL (ref 35.1–46.3)
EOSINOPHIL # BLD AUTO: 0.56 X10(3) UL (ref 0–0.7)
EOSINOPHIL NFR BLD AUTO: 9.4 %
ERYTHROCYTE [DISTWIDTH] IN BLOOD BY AUTOMATED COUNT: 12.1 % (ref 11–15)
GFR SERPLBLD BASED ON 1.73 SQ M-ARVRAT: 38 ML/MIN/1.73M2 (ref 60–?)
GLOBULIN PLAS-MCNC: 3.6 G/DL (ref 2.8–4.4)
GLUCOSE BLD-MCNC: 122 MG/DL (ref 70–99)
GLUCOSE BLDC GLUCOMTR-MCNC: 119 MG/DL (ref 70–99)
GLUCOSE UR-MCNC: >1000 MG/DL
HCT VFR BLD AUTO: 38.9 %
HGB BLD-MCNC: 12.5 G/DL
HGB UR QL STRIP.AUTO: NEGATIVE
HYALINE CASTS #/AREA URNS AUTO: PRESENT /LPF
IMM GRANULOCYTES # BLD AUTO: 0.02 X10(3) UL (ref 0–1)
IMM GRANULOCYTES NFR BLD: 0.3 %
KETONES UR-MCNC: NEGATIVE MG/DL
LEUKOCYTE ESTERASE UR QL STRIP.AUTO: NEGATIVE
LYMPHOCYTES # BLD AUTO: 1.71 X10(3) UL (ref 1–4)
LYMPHOCYTES NFR BLD AUTO: 28.8 %
MCH RBC QN AUTO: 31.5 PG (ref 26–34)
MCHC RBC AUTO-ENTMCNC: 32.1 G/DL (ref 31–37)
MCV RBC AUTO: 98 FL
MONOCYTES # BLD AUTO: 0.42 X10(3) UL (ref 0.1–1)
MONOCYTES NFR BLD AUTO: 7.1 %
NEUTROPHILS # BLD AUTO: 3.13 X10 (3) UL (ref 1.5–7.7)
NEUTROPHILS # BLD AUTO: 3.13 X10(3) UL (ref 1.5–7.7)
NEUTROPHILS NFR BLD AUTO: 52.9 %
NITRITE UR QL STRIP.AUTO: NEGATIVE
OSMOLALITY SERPL CALC.SUM OF ELEC: 308 MOSM/KG (ref 275–295)
P AXIS: 67 DEGREES
P-R INTERVAL: 190 MS
PH UR: 7 [PH] (ref 5–8)
PLATELET # BLD AUTO: 130 10(3)UL (ref 150–450)
POTASSIUM SERPL-SCNC: 3.4 MMOL/L (ref 3.5–5.1)
PROT SERPL-MCNC: 7.1 G/DL (ref 6.4–8.2)
PROT UR-MCNC: NEGATIVE MG/DL
Q-T INTERVAL: 526 MS
QRS DURATION: 102 MS
QTC CALCULATION (BEZET): 533 MS
R AXIS: 58 DEGREES
RBC # BLD AUTO: 3.97 X10(6)UL
SARS-COV-2 RNA RESP QL NAA+PROBE: NOT DETECTED
SODIUM SERPL-SCNC: 143 MMOL/L (ref 136–145)
SP GR UR STRIP: 1.01 (ref 1–1.03)
T AXIS: 113 DEGREES
UROBILINOGEN UR STRIP-ACNC: NORMAL
VENTRICULAR RATE: 62 BPM
WBC # BLD AUTO: 5.9 X10(3) UL (ref 4–11)

## 2023-04-03 PROCEDURE — 82962 GLUCOSE BLOOD TEST: CPT

## 2023-04-03 PROCEDURE — 70450 CT HEAD/BRAIN W/O DYE: CPT | Performed by: EMERGENCY MEDICINE

## 2023-04-03 PROCEDURE — 93010 ELECTROCARDIOGRAM REPORT: CPT

## 2023-04-03 PROCEDURE — 99285 EMERGENCY DEPT VISIT HI MDM: CPT

## 2023-04-03 PROCEDURE — 80053 COMPREHEN METABOLIC PANEL: CPT

## 2023-04-03 PROCEDURE — 93005 ELECTROCARDIOGRAM TRACING: CPT

## 2023-04-03 PROCEDURE — 36415 COLL VENOUS BLD VENIPUNCTURE: CPT

## 2023-04-03 PROCEDURE — 80053 COMPREHEN METABOLIC PANEL: CPT | Performed by: EMERGENCY MEDICINE

## 2023-04-03 PROCEDURE — 85025 COMPLETE CBC W/AUTO DIFF WBC: CPT

## 2023-04-03 PROCEDURE — 85025 COMPLETE CBC W/AUTO DIFF WBC: CPT | Performed by: EMERGENCY MEDICINE

## 2023-04-03 RX ORDER — POTASSIUM CHLORIDE 20 MEQ/1
40 TABLET, EXTENDED RELEASE ORAL ONCE
Status: COMPLETED | OUTPATIENT
Start: 2023-04-03 | End: 2023-04-03

## 2023-04-03 NOTE — ED INITIAL ASSESSMENT (HPI)
Pt arrives via EMS from [facility] after a syncopal episode that lasted approximately 1 min. Witnessed by pt's family. Pt was sitting at time and did not fall.     A&Ox2-3, baseline unknown

## 2023-05-03 RX ORDER — GLIMEPIRIDE 2 MG/1
TABLET ORAL
Qty: 90 TABLET | Refills: 1 | Status: SHIPPED | OUTPATIENT
Start: 2023-05-03

## 2023-05-04 ENCOUNTER — TELEPHONE (OUTPATIENT)
Dept: INTERNAL MEDICINE CLINIC | Facility: CLINIC | Age: 88
End: 2023-05-04

## 2023-05-04 DIAGNOSIS — R41.82 ALTERED MENTAL STATUS, UNSPECIFIED ALTERED MENTAL STATUS TYPE: Primary | ICD-10-CM

## 2023-05-05 ENCOUNTER — LAB ENCOUNTER (OUTPATIENT)
Dept: LAB | Facility: HOSPITAL | Age: 88
End: 2023-05-05
Attending: INTERNAL MEDICINE
Payer: MEDICARE

## 2023-05-05 LAB
BILIRUB UR QL: NEGATIVE
CLARITY UR: CLEAR
COLOR UR: YELLOW
GLUCOSE UR-MCNC: 500 MG/DL
HGB UR QL STRIP.AUTO: NEGATIVE
KETONES UR-MCNC: NEGATIVE MG/DL
LEUKOCYTE ESTERASE UR QL STRIP.AUTO: NEGATIVE
NITRITE UR QL STRIP.AUTO: NEGATIVE
PH UR: 6 [PH] (ref 5–8)
PROT UR-MCNC: NEGATIVE MG/DL
SP GR UR STRIP: 1.01 (ref 1–1.03)
UROBILINOGEN UR STRIP-ACNC: 0.2

## 2023-05-05 PROCEDURE — 81003 URINALYSIS AUTO W/O SCOPE: CPT | Performed by: INTERNAL MEDICINE

## 2023-05-05 NOTE — TELEPHONE ENCOUNTER
Daughter called. Mother is resident at [facility] and staff is concerned about UTI potentially causing increased confusion. Daughter spoke with mother and she feels that mother's mental status is at baseline. Pt is not complaining of any dysuria. I placed order for UA with culture reflex.   Son will take patient for UA in AM,

## 2023-05-09 ENCOUNTER — TELEPHONE (OUTPATIENT)
Dept: INTERNAL MEDICINE CLINIC | Facility: CLINIC | Age: 88
End: 2023-05-09

## 2023-05-09 NOTE — TELEPHONE ENCOUNTER
----- Message from Tracey Rivas MD sent at 5/8/2023  9:11 AM CDT -----  Urinalysis is negative for UTI. However it does show large amount of glucose. Patient is a resident at [facility] and is known diabetic. She does follow-up with endocrinology regarding management of diabetes with last A1c in February of 7.2  Staff at [facility] needs to monitor Accu-Cheks at least daily in the a.m.--send results to us in 7 to 10 days. Please notify daughter of above.

## 2023-05-22 ENCOUNTER — TELEPHONE (OUTPATIENT)
Dept: FAMILY MEDICINE CLINIC | Facility: CLINIC | Age: 88
End: 2023-05-22

## 2023-05-22 RX ORDER — INSULIN GLARGINE 100 [IU]/ML
18 INJECTION, SOLUTION SUBCUTANEOUS DAILY
Qty: 18 ML | Refills: 1 | Status: SHIPPED | OUTPATIENT
Start: 2023-05-22

## 2023-05-22 NOTE — TELEPHONE ENCOUNTER
Pt is requesting refill for the following medication        LANTUS SOLOSTAR 100 UNIT/ML Subcutaneous Solution Pen-injector, INJECT 20 UNITS INTO THE SKIN EVERY MORNING.(2 UNITS ADDITIONAL FOR TEST DOSE), Disp: 18 mL, Rfl: 1

## 2023-06-22 ENCOUNTER — OFFICE VISIT (OUTPATIENT)
Dept: INTERNAL MEDICINE CLINIC | Facility: CLINIC | Age: 88
End: 2023-06-22

## 2023-06-22 ENCOUNTER — TELEPHONE (OUTPATIENT)
Dept: INTERNAL MEDICINE CLINIC | Facility: CLINIC | Age: 88
End: 2023-06-22

## 2023-06-22 VITALS
OXYGEN SATURATION: 97 % | SYSTOLIC BLOOD PRESSURE: 104 MMHG | HEIGHT: 61 IN | BODY MASS INDEX: 22.28 KG/M2 | HEART RATE: 60 BPM | WEIGHT: 118 LBS | DIASTOLIC BLOOD PRESSURE: 56 MMHG | TEMPERATURE: 98 F

## 2023-06-22 DIAGNOSIS — Z79.4 TYPE 2 DIABETES MELLITUS WITH DIABETIC POLYNEUROPATHY, WITH LONG-TERM CURRENT USE OF INSULIN (HCC): Primary | ICD-10-CM

## 2023-06-22 DIAGNOSIS — Z95.1 S/P CABG X 3: ICD-10-CM

## 2023-06-22 DIAGNOSIS — D69.6 THROMBOCYTOPENIA (HCC): ICD-10-CM

## 2023-06-22 DIAGNOSIS — E78.00 HYPERCHOLESTEROLEMIA: ICD-10-CM

## 2023-06-22 DIAGNOSIS — G62.9 PERIPHERAL POLYNEUROPATHY: ICD-10-CM

## 2023-06-22 DIAGNOSIS — N18.32 TYPE 2 DIABETES MELLITUS WITH STAGE 3B CHRONIC KIDNEY DISEASE, WITH LONG-TERM CURRENT USE OF INSULIN (HCC): ICD-10-CM

## 2023-06-22 DIAGNOSIS — Z79.4 TYPE 2 DIABETES MELLITUS WITH STAGE 3B CHRONIC KIDNEY DISEASE, WITH LONG-TERM CURRENT USE OF INSULIN (HCC): ICD-10-CM

## 2023-06-22 DIAGNOSIS — I10 ESSENTIAL HYPERTENSION: ICD-10-CM

## 2023-06-22 DIAGNOSIS — Z00.00 ANNUAL PHYSICAL EXAM: ICD-10-CM

## 2023-06-22 DIAGNOSIS — N18.32 STAGE 3B CHRONIC KIDNEY DISEASE (HCC): ICD-10-CM

## 2023-06-22 DIAGNOSIS — E03.9 HYPOTHYROIDISM, UNSPECIFIED TYPE: ICD-10-CM

## 2023-06-22 DIAGNOSIS — R53.83 FATIGUE, UNSPECIFIED TYPE: ICD-10-CM

## 2023-06-22 DIAGNOSIS — I73.9 PERIPHERAL VASCULAR DISEASE (HCC): ICD-10-CM

## 2023-06-22 DIAGNOSIS — I25.10 ASHD (ARTERIOSCLEROTIC HEART DISEASE): ICD-10-CM

## 2023-06-22 DIAGNOSIS — E55.9 VITAMIN D DEFICIENCY: ICD-10-CM

## 2023-06-22 DIAGNOSIS — E11.42 TYPE 2 DIABETES MELLITUS WITH DIABETIC POLYNEUROPATHY, WITH LONG-TERM CURRENT USE OF INSULIN (HCC): Primary | ICD-10-CM

## 2023-06-22 DIAGNOSIS — E11.22 TYPE 2 DIABETES MELLITUS WITH STAGE 3B CHRONIC KIDNEY DISEASE, WITH LONG-TERM CURRENT USE OF INSULIN (HCC): ICD-10-CM

## 2023-06-22 DIAGNOSIS — K21.9 GASTROESOPHAGEAL REFLUX DISEASE WITHOUT ESOPHAGITIS: ICD-10-CM

## 2023-06-22 DIAGNOSIS — C50.911 MALIGNANT NEOPLASM OF RIGHT FEMALE BREAST, UNSPECIFIED ESTROGEN RECEPTOR STATUS, UNSPECIFIED SITE OF BREAST (HCC): ICD-10-CM

## 2023-06-22 PROCEDURE — 99214 OFFICE O/P EST MOD 30 MIN: CPT | Performed by: INTERNAL MEDICINE

## 2023-06-22 PROCEDURE — 1126F AMNT PAIN NOTED NONE PRSNT: CPT | Performed by: INTERNAL MEDICINE

## 2023-06-22 NOTE — PATIENT INSTRUCTIONS
Patient is to continue her current diet, medication and activity. Patient is up-to-date on her COVID vaccines. Patient is to receive her flu vaccine this autumn. Patient is also receive a COVID-vaccine, if available, this autumn. I will plan to see the patient back in 3 months with blood test, urinalysis and EKG for her annual physical examination. I will see the patient back sooner as necessary.

## 2023-06-23 ENCOUNTER — PATIENT MESSAGE (OUTPATIENT)
Dept: INTERNAL MEDICINE CLINIC | Facility: CLINIC | Age: 88
End: 2023-06-23

## 2023-06-23 ENCOUNTER — TELEPHONE (OUTPATIENT)
Dept: INTERNAL MEDICINE CLINIC | Facility: CLINIC | Age: 88
End: 2023-06-23

## 2023-06-23 DIAGNOSIS — Z11.1 SCREENING FOR TUBERCULOSIS: Primary | ICD-10-CM

## 2023-06-23 NOTE — TELEPHONE ENCOUNTER
----- Message from Jose Yuan. Ellen Ramírez sent at 6/23/2023  2:32 PM CDT -----  Regarding: Need a TB test   Contact: [phone]  Good afternoon. My mom Snow Diehl who lives at [facility] in [place] needs a TB test. Could you please fax an order to  [phone] for a Quantiferon test (blood test) -[facility] the nurse at [facility] will have it done for my mom at their Lab day which is Wednesday. Thank you.

## 2023-06-23 NOTE — TELEPHONE ENCOUNTER
NOted.  I have approved the order as requested. OK to print out a copy of the order and fax it to [facility] as requested.   I will route this to nursing!!  Thank you!!

## 2023-06-23 NOTE — TELEPHONE ENCOUNTER
From: Denzel Little  To: Fernando Elliott MD  Sent: 6/23/2023 2:32 PM CDT  Subject: Need a TB test     Good afternoon. My mom Kavon Travis who lives at [facility] in [facility] needs a TB test. Could you please fax an order to [phone] for a Quantiferon test (blood test) -Vi Ortega the nurse at [facility] will have it done for my mom at their Lab day which is Wednesday. Thank you.

## 2023-06-23 NOTE — TELEPHONE ENCOUNTER
Order for   TB quantiferon test faxed to [facility] at [facility] recieved  Called daughter per hipaa and relayed this message - verbalized understanding

## 2023-06-28 ENCOUNTER — LAB ENCOUNTER (OUTPATIENT)
Dept: LAB | Facility: HOSPITAL | Age: 88
End: 2023-06-28
Payer: MEDICARE

## 2023-06-28 DIAGNOSIS — Z11.1 SCREENING FOR TUBERCULOSIS: ICD-10-CM

## 2023-06-28 PROCEDURE — 86480 TB TEST CELL IMMUN MEASURE: CPT

## 2023-06-28 PROCEDURE — 36415 COLL VENOUS BLD VENIPUNCTURE: CPT

## 2023-06-29 LAB
M TB IFN-G CD4+ T-CELLS BLD-ACNC: 0 IU/ML
M TB TUBERC IFN-G BLD QL: NEGATIVE
M TB TUBERC IGNF/MITOGEN IGNF CONTROL: >10 IU/ML
QFT TB1 AG MINUS NIL: 0.01 IU/ML
QFT TB2 AG MINUS NIL: 0.01 IU/ML

## 2023-07-02 ENCOUNTER — TELEPHONE (OUTPATIENT)
Dept: INTERNAL MEDICINE CLINIC | Facility: CLINIC | Age: 88
End: 2023-07-02

## 2023-07-12 ENCOUNTER — TELEPHONE (OUTPATIENT)
Dept: INTERNAL MEDICINE CLINIC | Facility: CLINIC | Age: 88
End: 2023-07-12

## 2023-07-12 RX ORDER — LORAZEPAM 0.5 MG/1
0.5 TABLET ORAL 2 TIMES DAILY PRN
Qty: 30 TABLET | Refills: 5 | Status: SHIPPED | OUTPATIENT
Start: 2023-07-12 | End: 2023-07-14

## 2023-07-12 NOTE — TELEPHONE ENCOUNTER
Call to patient's daughter to let her know that I have sent a prescription for lorazepam 0.5 mg #30 that the patient can take 1 tablet twice a day as necessary for anxiety. I have given her 11 refills to use as necessary. Daughter does not want patient \"too sedated\". I concur with her recommendation. We will start with a low-dose of lorazepam to see if this helps control some of her crying and anxiety.

## 2023-07-12 NOTE — TELEPHONE ENCOUNTER
Pt has recently been moved to an assisted living facility at [facility]   She is having trouble adjusting; pt is crying, not wanting help   Requests order for PRN medication - to calm patient - hoping this can be sent in today   Pt's pharmacy is [address]     Please call daughter Edita Craig to confirm [phone]

## 2023-07-13 ENCOUNTER — PATIENT MESSAGE (OUTPATIENT)
Dept: INTERNAL MEDICINE CLINIC | Facility: CLINIC | Age: 88
End: 2023-07-13

## 2023-07-14 ENCOUNTER — TELEPHONE (OUTPATIENT)
Dept: INTERNAL MEDICINE CLINIC | Facility: CLINIC | Age: 88
End: 2023-07-14

## 2023-07-14 ENCOUNTER — TELEPHONE (OUTPATIENT)
Dept: ENDOCRINOLOGY CLINIC | Facility: CLINIC | Age: 88
End: 2023-07-14

## 2023-07-14 RX ORDER — METFORMIN HYDROCHLORIDE 500 MG/1
500 TABLET, EXTENDED RELEASE ORAL
Qty: 90 TABLET | Refills: 1 | Status: ON HOLD | OUTPATIENT
Start: 2023-07-14

## 2023-07-14 RX ORDER — LORAZEPAM 0.5 MG/1
0.5 TABLET ORAL EVERY 6 HOURS PRN
Qty: 60 TABLET | Refills: 3 | Status: ON HOLD | OUTPATIENT
Start: 2023-07-14 | End: 2024-07-13

## 2023-07-14 NOTE — TELEPHONE ENCOUNTER
Reason for call:  Medication concern      Allergies:    Beta Adrenergic Blo*      Metformin               OTHER (SEE COMMENTS)    Comment:Oral  Naproxen                PAIN  Adhesive Tape           RASH        Discussed with patient :  Call from [facility] lai. New admit 2 concerns:    Glimeperide added was on listed allergy - per last chart review, Endocrinology, Dr. Grace Tsai 2/13/2023 she was continued on glimeperide 2 mg daily  OK for amlodipine 5 mg and diltiazem 240 mg daily? Per Cardiology note 3/6/2023 Dr. Tonia Carreno diltiazem 240 mg continue, and BP improved with amlodipine 5 mg      Recommendations:  Med list accurate and relfected in last OV notes.  NOtify us If any issues

## 2023-07-14 NOTE — TELEPHONE ENCOUNTER
Dr. Escobedo Able,     Please advise if can switch Lantus--> Metformin     Patient refuses insulin pen in assisted living, has been difficult to administer per daughter and nurse, requesting in oral form. BG am fastin/13: 125  7/10 and : 129 and 107    [facility] pharmacy: [facility] in [facility]     Please call patient once Rx has been sent/any udpates.

## 2023-07-14 NOTE — TELEPHONE ENCOUNTER
To Dr Huertas Notice --- Please see both triage notes below . Spoke with Joi Ratliff, states today the 0.5 mg lorazepam dose is not working. States pt is making mean comments to her, Best Obrien are a horrible daughter. \" \"You will have to live with putting me in a home on your conscious. \"     Joi Ratliff denies any threats or harm to self or others. Sates the pt is just being mean and not cooperating with her. Denies safety concern. Advised if at any point the pts safety or safety of others are at risk, report to ER. She asks if there is another medication of if the dose can be either increased or increase frequency. Marli Palacios to call with any changes or additional concerns.

## 2023-07-14 NOTE — TELEPHONE ENCOUNTER
Pt daughter is calling to discuss changing medication. Pt has become combative in the nursing home  She is requesting medications be changed to Metformin.   Please call

## 2023-07-14 NOTE — TELEPHONE ENCOUNTER
OK to increase dose q6h PRN -as symptoms are severe and escalating, please reinforce that this medication can cause drowsiness/sleepiness/can increase the risk for falls and should be monitored very closely while on this medication. Should only use as needed to keep patient calm. We will need to follow-up with Dr. Arielle Palacio on a long-term plan as ideally this is a slight medication adjustment. I did place a new refill to reflect this.

## 2023-07-15 ENCOUNTER — APPOINTMENT (OUTPATIENT)
Dept: GENERAL RADIOLOGY | Facility: HOSPITAL | Age: 88
End: 2023-07-15
Attending: EMERGENCY MEDICINE
Payer: MEDICARE

## 2023-07-15 ENCOUNTER — TELEPHONE (OUTPATIENT)
Dept: INTERNAL MEDICINE CLINIC | Facility: CLINIC | Age: 88
End: 2023-07-15

## 2023-07-15 NOTE — TELEPHONE ENCOUNTER
On call telephone from pt's daughter, Mercy Jimenez. Pt has been moved to Assisted Living. Pt has been confused but also has become agitated and combative. I have given pt a prescription for Ativan which is not helping. I have now called in a new prescription for Cymbalta 30 mgms orally daily. If agitated behavior persists then pt will need to go to the ER.

## 2023-07-16 ENCOUNTER — TELEPHONE (OUTPATIENT)
Dept: INTERNAL MEDICINE CLINIC | Facility: CLINIC | Age: 88
End: 2023-07-16

## 2023-07-16 PROBLEM — S72.002A CLOSED FRACTURE OF LEFT HIP, INITIAL ENCOUNTER (HCC): Status: ACTIVE | Noted: 2023-01-01

## 2023-07-16 PROBLEM — F03.911 DEMENTIA WITH AGITATION (HCC): Status: ACTIVE | Noted: 2023-01-01

## 2023-07-16 PROBLEM — S72.002A CLOSED FRACTURE OF LEFT HIP, INITIAL ENCOUNTER (HCC): Status: ACTIVE | Noted: 2023-07-16

## 2023-07-16 PROBLEM — F03.911 DEMENTIA WITH AGITATION (HCC): Status: ACTIVE | Noted: 2023-07-16

## 2023-07-16 NOTE — TELEPHONE ENCOUNTER
ON call telephone calls from [facility] regarding Mrs Mikal Davison. Pt was sent to the ER at [facility] yesterday due to her agitation and screaming out. Pt had recently been moved from her prior apartment to the assisted living area at [facility]. Yesterday I spoke with pt's daughter, Claritza Valdez, concerning pt's behavior which Claritza Valdez felt was due to the move. Last week I had prescribed Ativan 0.5 mg to be used twice a day as necessary. Mary Schmitt asked me to consider placing pt on Cymbalta which I did order for pt(Cymbalta 30 mg to be given q AM). Pt never received this med. Pt was sent to the ER yesterday due to her agitation. She was later sent back to [facility]. I was called at 2:00 AM by nurse who had found pt on the floor of her apartment,  Nurse indicated that pt's son was supposed to be staying with pt but apparently went home. Nurse was concerned about pt's fall and wanted to return pt to the ER but nurse told me that pt's daughter refused to have pt return to the ER last night. Pt was able to be returned to bed. I was contacted by nurse at 11:00 AM today that pt appeared to have right hip pain and was sent back to the ER prior to calling me. Pt's son was with pt this AM and is aware of pt's transfer to the ER.

## 2023-07-16 NOTE — PLAN OF CARE
Alert and oriented x 1, reoriented as needed with no improvement. Receiving IV fluids per MD order. Incontinent when patient arrived to unit, incontinence care provided. SCDs for DVT prophylaxis. Denied need for pain medication. Vital signs monitored. No acute changes noted throughout shift. Fall precautions in place- bed in lowest position, call light and personal belongings within reach, non-skid socks in place. Frequent rounding by nursing staff. Plan is pending ortho consult. Problem: Patient Centered Care  Goal: Patient preferences are identified and integrated in the patient's plan of care  Description: Interventions:  - What would you like us to know as we care for you?  From [facility] and recently moved to assisted living  - Provide timely, complete, and accurate information to patient/family  - Incorporate patient and family knowledge, values, beliefs, and cultural backgrounds into the planning and delivery of care  - Encourage patient/family to participate in care and decision-making at the level they choose  - Honor patient and family perspectives and choices  Outcome: Progressing     Problem: Patient/Family Goals  Goal: Patient/Family Long Term Goal  Description: Patient's Long Term Goal: go back to [facility]    Interventions:  -ortho consult  -therapy  -medications  - See additional Care Plan goals for specific interventions  Outcome: Progressing  Goal: Patient/Family Short Term Goal  Description: Patient's Short Term Goal: No agitation    Interventions:   - medications  -psych consult  - See additional Care Plan goals for specific interventions  Outcome: Progressing     Problem: PAIN - ADULT  Goal: Verbalizes/displays adequate comfort level or patient's stated pain goal  Description: INTERVENTIONS:  - Encourage pt to monitor pain and request assistance  - Assess pain using appropriate pain scale  - Administer analgesics based on type and severity of pain and evaluate response  - Implement non-pharmacological measures as appropriate and evaluate response  - Consider cultural and social influences on pain and pain management  - Manage/alleviate anxiety  - Utilize distraction and/or relaxation techniques  - Monitor for opioid side effects  - Notify MD/LIP if interventions unsuccessful or patient reports new pain  - Anticipate increased pain with activity and pre-medicate as appropriate  Outcome: Progressing     Problem: DISCHARGE PLANNING  Goal: Discharge to home or other facility with appropriate resources  Description: INTERVENTIONS:  - Identify barriers to discharge w/pt and caregiver  - Include patient/family/discharge partner in discharge planning  - Arrange for needed discharge resources and transportation as appropriate  - Identify discharge learning needs (meds, wound care, etc)  - Arrange for interpreters to assist at discharge as needed  - Consider post-discharge preferences of patient/family/discharge partner  - Complete POLST form as appropriate  - Assess patient's ability to be responsible for managing their own health  - Refer to Case Management Department for coordinating discharge planning if the patient needs post-hospital services based on physician/LIP order or complex needs related to functional status, cognitive ability or social support system  Outcome: Progressing     Problem: METABOLIC/FLUID AND ELECTROLYTES - ADULT  Goal: Glucose maintained within prescribed range  Description: INTERVENTIONS:  - Monitor Blood Glucose as ordered  - Assess for signs and symptoms of hyperglycemia and hypoglycemia  - Administer ordered medications to maintain glucose within target range  - Assess barriers to adequate nutritional intake and initiate nutrition consult as needed  - Instruct patient on self management of diabetes  Outcome: Progressing  Goal: Electrolytes maintained within normal limits  Description: INTERVENTIONS:  - Monitor labs and rhythm and assess patient for signs and symptoms of electrolyte imbalances  - Administer electrolyte replacement as ordered  - Monitor response to electrolyte replacements, including rhythm and repeat lab results as appropriate  - Fluid restriction as ordered  - Instruct patient on fluid and nutrition restrictions as appropriate  Outcome: Progressing     Problem: SKIN/TISSUE INTEGRITY - ADULT  Goal: Skin integrity remains intact  Description: INTERVENTIONS  - Assess and document risk factors for pressure ulcer development  - Assess and document skin integrity  - Monitor for areas of redness and/or skin breakdown  - Initiate interventions, skin care algorithm/standards of care as needed  Outcome: Progressing  Goal: Incision(s), wounds(s) or drain site(s) healing without S/S of infection  Description: INTERVENTIONS:  - Assess and document risk factors for pressure ulcer development  - Assess and document skin integrity  - Assess and document dressing/incision, wound bed, drain sites and surrounding tissue  - Implement wound care per orders  - Initiate isolation precautions as appropriate  - Initiate Pressure Ulcer prevention bundle as indicated  Outcome: Progressing

## 2023-07-16 NOTE — ED INITIAL ASSESSMENT (HPI)
Pt presents to ED via EMS from [facility] for a fall out of bed at 1am. Per EMS pt started on Cymbalta yesterday. Pt c/o left hip pain. +shortening and external rotation noted to the left leg. CMS intact.

## 2023-07-16 NOTE — ED QUICK NOTES
Orders for admission, patient is aware of plan and ready to go upstairs. Any questions, please call ED RN Rubina Birmingham at extension [phone].      Patient Covid vaccination status: Fully vaccinated and immunocompromised     COVID Test Ordered in ED: None    COVID Suspicion at Admission: N/A    Running Infusions:  None    Mental Status/LOC at time of transport: x2-3, baseline    Other pertinent information:   CIWA score: N/A   NIH score:  N/A

## 2023-07-17 ENCOUNTER — ANESTHESIA (OUTPATIENT)
Dept: SURGERY | Facility: HOSPITAL | Age: 88
End: 2023-07-17
Payer: MEDICARE

## 2023-07-17 ENCOUNTER — ANESTHESIA EVENT (OUTPATIENT)
Dept: SURGERY | Facility: HOSPITAL | Age: 88
End: 2023-07-17
Payer: MEDICARE

## 2023-07-17 PROBLEM — F39 EPISODIC MOOD DISORDER (HCC): Status: ACTIVE | Noted: 2023-01-01

## 2023-07-17 PROBLEM — F39 EPISODIC MOOD DISORDER (HCC): Status: ACTIVE | Noted: 2023-07-17

## 2023-07-17 PROBLEM — F05 DELIRIUM SUPERIMPOSED ON DEMENTIA: Status: ACTIVE | Noted: 2023-01-01

## 2023-07-17 PROBLEM — F05 DELIRIUM SUPERIMPOSED ON DEMENTIA: Status: ACTIVE | Noted: 2023-07-17

## 2023-07-17 PROCEDURE — P9045 ALBUMIN (HUMAN), 5%, 250 ML: HCPCS | Performed by: STUDENT IN AN ORGANIZED HEALTH CARE EDUCATION/TRAINING PROGRAM

## 2023-07-17 RX ORDER — ALBUMIN, HUMAN INJ 5% 5 %
SOLUTION INTRAVENOUS CONTINUOUS PRN
Status: DISCONTINUED | OUTPATIENT
Start: 2023-07-17 | End: 2023-07-17 | Stop reason: SURG

## 2023-07-17 RX ORDER — SODIUM CHLORIDE 9 MG/ML
INJECTION, SOLUTION INTRAVENOUS CONTINUOUS PRN
Status: DISCONTINUED | OUTPATIENT
Start: 2023-07-17 | End: 2023-07-17 | Stop reason: SURG

## 2023-07-17 RX ORDER — ALBUTEROL SULFATE 90 UG/1
AEROSOL, METERED RESPIRATORY (INHALATION) AS NEEDED
Status: DISCONTINUED | OUTPATIENT
Start: 2023-07-17 | End: 2023-07-17 | Stop reason: SURG

## 2023-07-17 RX ORDER — ROCURONIUM BROMIDE 10 MG/ML
INJECTION, SOLUTION INTRAVENOUS AS NEEDED
Status: DISCONTINUED | OUTPATIENT
Start: 2023-07-17 | End: 2023-07-17 | Stop reason: SURG

## 2023-07-17 RX ORDER — SODIUM CHLORIDE, SODIUM LACTATE, POTASSIUM CHLORIDE, CALCIUM CHLORIDE 600; 310; 30; 20 MG/100ML; MG/100ML; MG/100ML; MG/100ML
INJECTION, SOLUTION INTRAVENOUS CONTINUOUS PRN
Status: DISCONTINUED | OUTPATIENT
Start: 2023-07-17 | End: 2023-07-17 | Stop reason: SURG

## 2023-07-17 RX ORDER — MIDAZOLAM HYDROCHLORIDE 1 MG/ML
INJECTION INTRAMUSCULAR; INTRAVENOUS AS NEEDED
Status: DISCONTINUED | OUTPATIENT
Start: 2023-07-17 | End: 2023-07-17 | Stop reason: SURG

## 2023-07-17 RX ADMIN — MIDAZOLAM HYDROCHLORIDE 2 MG: 1 INJECTION INTRAMUSCULAR; INTRAVENOUS at 17:40:00

## 2023-07-17 RX ADMIN — CEFAZOLIN SODIUM/WATER 2 G: 2 G/20 ML SYRINGE (ML) INTRAVENOUS at 17:25:00

## 2023-07-17 RX ADMIN — ROCURONIUM BROMIDE 40 MG: 10 INJECTION, SOLUTION INTRAVENOUS at 17:40:00

## 2023-07-17 RX ADMIN — ALBUMIN, HUMAN INJ 5%: 5 SOLUTION INTRAVENOUS at 18:10:00

## 2023-07-17 RX ADMIN — SODIUM CHLORIDE: 9 INJECTION, SOLUTION INTRAVENOUS at 17:27:00

## 2023-07-17 RX ADMIN — SODIUM CHLORIDE, SODIUM LACTATE, POTASSIUM CHLORIDE, CALCIUM CHLORIDE: 600; 310; 30; 20 INJECTION, SOLUTION INTRAVENOUS at 17:27:00

## 2023-07-17 RX ADMIN — ALBUTEROL SULFATE 2 PUFF: 90 AEROSOL, METERED RESPIRATORY (INHALATION) at 18:45:00

## 2023-07-17 NOTE — PROGRESS NOTES
On call Nocturnist 07/17/23      Rapiod response called for new onset afib rvr. Patient laying in bed comfortably denies cp or sob. Just admitted for hip fracture. Awake alert answering questions, mm dry, neck supple, lungs coarse bilateral no acc use, heart tachy irregular, ext warm and dry. -stat ddimer with hip fx  -tx to tele  -a fib protocol cardize  -contacted hospitalist service      I spent a total of 35 minutes of critical care time in obtaining history, performing a physical exam, bedside monitoring of interventions, collecting and interpreting tests and discussion with consultants but not including time spent performing procedures.

## 2023-07-17 NOTE — CONSULTS
Patient seen and examined. Full consult dictated. I had a long conversation with the patient's daughter Glen Barrientos concerning the patient's left hip injury. Risks and benefits of surgery were discussed. Questions were answered. No guarantees were made. I strongly suggested surgical treatment of this problem. Without surgery the morbidity and mortality associated with her femur fracture would be significantly higher. Pain management will likely be much easier after surgery. We will consult with internal medicine concerning the need for echocardiogram.  Plan to proceed with surgery this evening.

## 2023-07-17 NOTE — PLAN OF CARE
Patient A&Ox1 on RA. Diltiazem started on arrival to tele. Now discontinued for HR in 40-50s. IVF. Straight cathed per protocol. for urinary retention Safety precautions maintained, call light and personal belongings within reach. NPO for possible left ORIF. Problem: Patient Centered Care  Goal: Patient preferences are identified and integrated in the patient's plan of care  Description: Interventions:  - What would you like us to know as we care for you?  From [facility] and recently moved to assisted living  - Provide timely, complete, and accurate information to patient/family  - Incorporate patient and family knowledge, values, beliefs, and cultural backgrounds into the planning and delivery of care  - Encourage patient/family to participate in care and decision-making at the level they choose  - Honor patient and family perspectives and choices  Outcome: Progressing     Problem: Patient/Family Goals  Goal: Patient/Family Long Term Goal  Description: Patient's Long Term Goal: go back to [facility]    Interventions:  -ortho consult  -therapy  -medications  - See additional Care Plan goals for specific interventions  Outcome: Progressing  Goal: Patient/Family Short Term Goal  Description: Patient's Short Term Goal: No agitation    Interventions:   - medications  -psych consult  - See additional Care Plan goals for specific interventions  Outcome: Progressing     Problem: PAIN - ADULT  Goal: Verbalizes/displays adequate comfort level or patient's stated pain goal  Description: INTERVENTIONS:  - Encourage pt to monitor pain and request assistance  - Assess pain using appropriate pain scale  - Administer analgesics based on type and severity of pain and evaluate response  - Implement non-pharmacological measures as appropriate and evaluate response  - Consider cultural and social influences on pain and pain management  - Manage/alleviate anxiety  - Utilize distraction and/or relaxation techniques  - Monitor for opioid side effects  - Notify MD/LIP if interventions unsuccessful or patient reports new pain  - Anticipate increased pain with activity and pre-medicate as appropriate  Outcome: Progressing     Problem: DISCHARGE PLANNING  Goal: Discharge to home or other facility with appropriate resources  Description: INTERVENTIONS:  - Identify barriers to discharge w/pt and caregiver  - Include patient/family/discharge partner in discharge planning  - Arrange for needed discharge resources and transportation as appropriate  - Identify discharge learning needs (meds, wound care, etc)  - Arrange for interpreters to assist at discharge as needed  - Consider post-discharge preferences of patient/family/discharge partner  - Complete POLST form as appropriate  - Assess patient's ability to be responsible for managing their own health  - Refer to Case Management Department for coordinating discharge planning if the patient needs post-hospital services based on physician/LIP order or complex needs related to functional status, cognitive ability or social support system  Outcome: Progressing     Problem: METABOLIC/FLUID AND ELECTROLYTES - ADULT  Goal: Glucose maintained within prescribed range  Description: INTERVENTIONS:  - Monitor Blood Glucose as ordered  - Assess for signs and symptoms of hyperglycemia and hypoglycemia  - Administer ordered medications to maintain glucose within target range  - Assess barriers to adequate nutritional intake and initiate nutrition consult as needed  - Instruct patient on self management of diabetes  Outcome: Progressing  Goal: Electrolytes maintained within normal limits  Description: INTERVENTIONS:  - Monitor labs and rhythm and assess patient for signs and symptoms of electrolyte imbalances  - Administer electrolyte replacement as ordered  - Monitor response to electrolyte replacements, including rhythm and repeat lab results as appropriate  - Fluid restriction as ordered  - Instruct patient on fluid and nutrition restrictions as appropriate  Outcome: Progressing     Problem: SKIN/TISSUE INTEGRITY - ADULT  Goal: Skin integrity remains intact  Description: INTERVENTIONS  - Assess and document risk factors for pressure ulcer development  - Assess and document skin integrity  - Monitor for areas of redness and/or skin breakdown  - Initiate interventions, skin care algorithm/standards of care as needed  Outcome: Progressing  Goal: Incision(s), wounds(s) or drain site(s) healing without S/S of infection  Description: INTERVENTIONS:  - Assess and document risk factors for pressure ulcer development  - Assess and document skin integrity  - Assess and document dressing/incision, wound bed, drain sites and surrounding tissue  - Implement wound care per orders  - Initiate isolation precautions as appropriate  - Initiate Pressure Ulcer prevention bundle as indicated  Outcome: Progressing

## 2023-07-17 NOTE — CM/SW NOTE
Per Ken Chadwick from [facility], pt moved to [facility] from [address] [place] [address] on 7/11/23. Pt will need PASRR if SNF recommended. SW/CM to remain available for support and/or discharge planning.       REGLA Pradhan, [facility]  Social Work   GRICEL:#[phone]

## 2023-07-17 NOTE — PLAN OF CARE
Patient alert to self, patient's vitals stable on room air. Patient intermittently denies pain. Patient confused and combative. Patient cleared for surgical repair of broken left femur. Consent obtained over phone with Rowena Green. Patient to transfer to 4th floor post-operatively. Report was given to Pre-op nurse. Problem: Patient Centered Care  Goal: Patient preferences are identified and integrated in the patient's plan of care  Description: Interventions:  - What would you like us to know as we care for you?  From [facility] and recently moved to assisted living  - Provide timely, complete, and accurate information to patient/family  - Incorporate patient and family knowledge, values, beliefs, and cultural backgrounds into the planning and delivery of care  - Encourage patient/family to participate in care and decision-making at the level they choose  - Honor patient and family perspectives and choices  Outcome: Progressing     Problem: Patient/Family Goals  Goal: Patient/Family Long Term Goal  Description: Patient's Long Term Goal: go back to [facility]    Interventions:  -ortho consult  -therapy  -medications  - See additional Care Plan goals for specific interventions  Outcome: Progressing  Goal: Patient/Family Short Term Goal  Description: Patient's Short Term Goal: No agitation    Interventions:   - medications  -psych consult  - See additional Care Plan goals for specific interventions  Outcome: Progressing     Problem: PAIN - ADULT  Goal: Verbalizes/displays adequate comfort level or patient's stated pain goal  Description: INTERVENTIONS:  - Encourage pt to monitor pain and request assistance  - Assess pain using appropriate pain scale  - Administer analgesics based on type and severity of pain and evaluate response  - Implement non-pharmacological measures as appropriate and evaluate response  - Consider cultural and social influences on pain and pain management  - Manage/alleviate anxiety  - Utilize distraction and/or relaxation techniques  - Monitor for opioid side effects  - Notify MD/LIP if interventions unsuccessful or patient reports new pain  - Anticipate increased pain with activity and pre-medicate as appropriate  Outcome: Progressing     Problem: DISCHARGE PLANNING  Goal: Discharge to home or other facility with appropriate resources  Description: INTERVENTIONS:  - Identify barriers to discharge w/pt and caregiver  - Include patient/family/discharge partner in discharge planning  - Arrange for needed discharge resources and transportation as appropriate  - Identify discharge learning needs (meds, wound care, etc)  - Arrange for interpreters to assist at discharge as needed  - Consider post-discharge preferences of patient/family/discharge partner  - Complete POLST form as appropriate  - Assess patient's ability to be responsible for managing their own health  - Refer to Case Management Department for coordinating discharge planning if the patient needs post-hospital services based on physician/LIP order or complex needs related to functional status, cognitive ability or social support system  Outcome: Progressing     Problem: METABOLIC/FLUID AND ELECTROLYTES - ADULT  Goal: Glucose maintained within prescribed range  Description: INTERVENTIONS:  - Monitor Blood Glucose as ordered  - Assess for signs and symptoms of hyperglycemia and hypoglycemia  - Administer ordered medications to maintain glucose within target range  - Assess barriers to adequate nutritional intake and initiate nutrition consult as needed  - Instruct patient on self management of diabetes  Outcome: Progressing  Goal: Electrolytes maintained within normal limits  Description: INTERVENTIONS:  - Monitor labs and rhythm and assess patient for signs and symptoms of electrolyte imbalances  - Administer electrolyte replacement as ordered  - Monitor response to electrolyte replacements, including rhythm and repeat lab results as appropriate  - Fluid restriction as ordered  - Instruct patient on fluid and nutrition restrictions as appropriate  Outcome: Progressing     Problem: SKIN/TISSUE INTEGRITY - ADULT  Goal: Skin integrity remains intact  Description: INTERVENTIONS  - Assess and document risk factors for pressure ulcer development  - Assess and document skin integrity  - Monitor for areas of redness and/or skin breakdown  - Initiate interventions, skin care algorithm/standards of care as needed  Outcome: Progressing  Goal: Incision(s), wounds(s) or drain site(s) healing without S/S of infection  Description: INTERVENTIONS:  - Assess and document risk factors for pressure ulcer development  - Assess and document skin integrity  - Assess and document dressing/incision, wound bed, drain sites and surrounding tissue  - Implement wound care per orders  - Initiate isolation precautions as appropriate  - Initiate Pressure Ulcer prevention bundle as indicated  Outcome: Progressing

## 2023-07-17 NOTE — PLAN OF CARE
RRT called. NSR to afib. 4L NC. Cardizem x2 5mg given. Digoxin 135mcg given. 500ml bolus given. Transfer to 5th floor, to start Cardizem drip. Report given. Family notified.

## 2023-07-17 NOTE — CONSULTS
Radiographs reviewed. Pt has a left femur intertrochanteric fracture. Recommend closed reduction and IM fixation of femur. Await echocardiogram.  Cardiology has cleared her for surgery at moderate risk. Plan to proceed today or tomorrow am, depending on timing of cardiac testing.

## 2023-07-17 NOTE — ANESTHESIA PROCEDURE NOTES
Airway  Date/Time: 7/17/2023 5:41 PM  Urgency: Elective      General Information and Staff    Patient location during procedure: OR  Anesthesiologist: Christine Call MD  Performed: anesthesiologist   Performed by: Christine Call MD  Authorized by: Christine Call MD      Indications and Patient Condition  Indications for airway management: anesthesia  Sedation level: deep  Preoxygenated: yes  Patient position: sniffing  Mask difficulty assessment: 1 - vent by mask    Final Airway Details  Final airway type: endotracheal airway      Successful airway: ETT  Cuffed: yes   Successful intubation technique: direct laryngoscopy  Endotracheal tube insertion site: oral  Blade: Simba  Blade size: #3  ETT size (mm): 7.0    Cormack-Lehane Classification: grade III - view of epiglottis only  Placement verified by: capnometry   Measured from: teeth  ETT to teeth (cm): 21  Number of attempts at approach: 1

## 2023-07-17 NOTE — OPERATIVE REPORT
Operative Note    Patient Name: Elizabeth Briseno    Preoperative Diagnosis: left femur intertrochanteric fracture    Postoperative Diagnosis: same    Primary Surgeon: Demetria Salvador MD     Assistant: Jaida Dale    Procedures: L femur IT fx closed reduction, IM fixation with fluoroscopy    Surgical Findings: above    Anesthesia: General    Complications: none    Specimen: none    Drains: none    Condition: stable to RR    Estimated Blood Loss: Jolie Banerjee MD

## 2023-07-18 NOTE — ADDENDUM NOTE
Addendum  created 07/17/23 1905 by Khris Dennison MD    Clinical Note Signed, Flowsheet accepted, Intraprocedure Event edited

## 2023-07-18 NOTE — PLAN OF CARE
Pt received from PACU last night s/p LT hip CRIF. Lt hip surgical site with no s/sx of bleeding, dressing C/D/I. VSS. Has periods of agitation/restlessness, PRN Zyprexa IM given and effective. Restraints continues for safety. PRN Morphine given x pain. POA notified about restraints and updated about pt's status and also confirmed that pt is now back to [facility]. Bed is locked and placed in lowest position. Problem: Safety Risk - Non-Violent Restraints  Goal: Patient will remain free from self-harm  Description: INTERVENTIONS:  - Apply the least restrictive restraint to prevent harm  - Notify patient and family of reasons restraints applied  - Assess for any contributing factors to confusion (electrolyte disturbances, delirium, medications)  - Discontinue any unnecessary medical devices as soon as possible  - Assess the patient's physical comfort, circulation, skin condition, hydration, nutrition and elimination needs   - Reorient and redirection as needed  - Assess for the need to continue restraints  7/18/2023 0519 by Karley Milian RN  Outcome: Not Progressing  7/17/2023 2200 by Karley Milian RN  Outcome: Not Progressing     Problem: Patient Centered Care  Goal: Patient preferences are identified and integrated in the patient's plan of care  Description: Interventions:  - What would you like us to know as we care for you?  From [facility] and recently moved to assisted living  - Provide timely, complete, and accurate information to patient/family  - Incorporate patient and family knowledge, values, beliefs, and cultural backgrounds into the planning and delivery of care  - Encourage patient/family to participate in care and decision-making at the level they choose  - Honor patient and family perspectives and choices  Outcome: Progressing     Problem: Patient/Family Goals  Goal: Patient/Family Short Term Goal  Description: Patient's Short Term Goal: No agitation    Interventions:   - medications  -psych consult  - See additional Care Plan goals for specific interventions  Outcome: Progressing     Problem: PAIN - ADULT  Goal: Verbalizes/displays adequate comfort level or patient's stated pain goal  Description: INTERVENTIONS:  - Encourage pt to monitor pain and request assistance  - Assess pain using appropriate pain scale  - Administer analgesics based on type and severity of pain and evaluate response  - Implement non-pharmacological measures as appropriate and evaluate response  - Consider cultural and social influences on pain and pain management  - Manage/alleviate anxiety  - Utilize distraction and/or relaxation techniques  - Monitor for opioid side effects  - Notify MD/LIP if interventions unsuccessful or patient reports new pain  - Anticipate increased pain with activity and pre-medicate as appropriate  Outcome: Progressing     Problem: METABOLIC/FLUID AND ELECTROLYTES - ADULT  Goal: Glucose maintained within prescribed range  Description: INTERVENTIONS:  - Monitor Blood Glucose as ordered  - Assess for signs and symptoms of hyperglycemia and hypoglycemia  - Administer ordered medications to maintain glucose within target range  - Assess barriers to adequate nutritional intake and initiate nutrition consult as needed  - Instruct patient on self management of diabetes  Outcome: Progressing  Goal: Electrolytes maintained within normal limits  Description: INTERVENTIONS:  - Monitor labs and rhythm and assess patient for signs and symptoms of electrolyte imbalances  - Administer electrolyte replacement as ordered  - Monitor response to electrolyte replacements, including rhythm and repeat lab results as appropriate  - Fluid restriction as ordered  - Instruct patient on fluid and nutrition restrictions as appropriate  Outcome: Progressing     Problem: SKIN/TISSUE INTEGRITY - ADULT  Goal: Skin integrity remains intact  Description: INTERVENTIONS  - Assess and document risk factors for pressure ulcer development  - Assess and document skin integrity  - Monitor for areas of redness and/or skin breakdown  - Initiate interventions, skin care algorithm/standards of care as needed  Outcome: Progressing  Goal: Incision(s), wounds(s) or drain site(s) healing without S/S of infection  Description: INTERVENTIONS:  - Assess and document risk factors for pressure ulcer development  - Assess and document skin integrity  - Assess and document dressing/incision, wound bed, drain sites and surrounding tissue  - Implement wound care per orders  - Initiate isolation precautions as appropriate  - Initiate Pressure Ulcer prevention bundle as indicated  Outcome: Progressing     Problem: Patient/Family Goals  Goal: Patient/Family Long Term Goal  Description: Patient's Long Term Goal: go back to [facility]    Interventions:  -ortho consult  -therapy  -medications  - See additional Care Plan goals for specific interventions  Outcome: Not Progressing     Problem: DISCHARGE PLANNING  Goal: Discharge to home or other facility with appropriate resources  Description: INTERVENTIONS:  - Identify barriers to discharge w/pt and caregiver  - Include patient/family/discharge partner in discharge planning  - Arrange for needed discharge resources and transportation as appropriate  - Identify discharge learning needs (meds, wound care, etc)  - Arrange for interpreters to assist at discharge as needed  - Consider post-discharge preferences of patient/family/discharge partner  - Complete POLST form as appropriate  - Assess patient's ability to be responsible for managing their own health  - Refer to Case Management Department for coordinating discharge planning if the patient needs post-hospital services based on physician/LIP order or complex needs related to functional status, cognitive ability or social support system  Outcome: Not Progressing

## 2023-07-18 NOTE — HOSPICE RN NOTE
Hospice referral received, carmina malave. Meeting scheduled for 11am tomorrow morning with pt son in the room and pt daughter on the phone.

## 2023-07-18 NOTE — PLAN OF CARE
Patient is extremely agitated all day and crying out. MD and daughter aware and plan for hospice meeting tomorrow. Psych gave as needed medications for agitation. UA/UC completed off yo. Patient seen by speech and refusing to take anything by mouth, patient NPO now. IV bolus given. Fluids adjusted. Problem: Patient Centered Care  Goal: Patient preferences are identified and integrated in the patient's plan of care  Description: Interventions:  - What would you like us to know as we care for you?  From [facility] and recently moved to assisted living  - Provide timely, complete, and accurate information to patient/family  - Incorporate patient and family knowledge, values, beliefs, and cultural backgrounds into the planning and delivery of care  - Encourage patient/family to participate in care and decision-making at the level they choose  - Honor patient and family perspectives and choices  Outcome: Not Progressing     Problem: Patient/Family Goals  Goal: Patient/Family Long Term Goal  Description: Patient's Long Term Goal: go back to [facility]    Interventions:  -ortho consult  -therapy  -medications  - See additional Care Plan goals for specific interventions  Outcome: Not Progressing  Goal: Patient/Family Short Term Goal  Description: Patient's Short Term Goal: No agitation    Interventions:   - medications  -psych consult  - See additional Care Plan goals for specific interventions  Outcome: Not Progressing     Problem: PAIN - ADULT  Goal: Verbalizes/displays adequate comfort level or patient's stated pain goal  Description: INTERVENTIONS:  - Encourage pt to monitor pain and request assistance  - Assess pain using appropriate pain scale  - Administer analgesics based on type and severity of pain and evaluate response  - Implement non-pharmacological measures as appropriate and evaluate response  - Consider cultural and social influences on pain and pain management  - Manage/alleviate anxiety  - Utilize distraction and/or relaxation techniques  - Monitor for opioid side effects  - Notify MD/LIP if interventions unsuccessful or patient reports new pain  - Anticipate increased pain with activity and pre-medicate as appropriate  Outcome: Not Progressing     Problem: DISCHARGE PLANNING  Goal: Discharge to home or other facility with appropriate resources  Description: INTERVENTIONS:  - Identify barriers to discharge w/pt and caregiver  - Include patient/family/discharge partner in discharge planning  - Arrange for needed discharge resources and transportation as appropriate  - Identify discharge learning needs (meds, wound care, etc)  - Arrange for interpreters to assist at discharge as needed  - Consider post-discharge preferences of patient/family/discharge partner  - Complete POLST form as appropriate  - Assess patient's ability to be responsible for managing their own health  - Refer to Case Management Department for coordinating discharge planning if the patient needs post-hospital services based on physician/LIP order or complex needs related to functional status, cognitive ability or social support system  Outcome: Not Progressing     Problem: METABOLIC/FLUID AND ELECTROLYTES - ADULT  Goal: Glucose maintained within prescribed range  Description: INTERVENTIONS:  - Monitor Blood Glucose as ordered  - Assess for signs and symptoms of hyperglycemia and hypoglycemia  - Administer ordered medications to maintain glucose within target range  - Assess barriers to adequate nutritional intake and initiate nutrition consult as needed  - Instruct patient on self management of diabetes  Outcome: Not Progressing  Goal: Electrolytes maintained within normal limits  Description: INTERVENTIONS:  - Monitor labs and rhythm and assess patient for signs and symptoms of electrolyte imbalances  - Administer electrolyte replacement as ordered  - Monitor response to electrolyte replacements, including rhythm and repeat lab results as appropriate  - Fluid restriction as ordered  - Instruct patient on fluid and nutrition restrictions as appropriate  Outcome: Not Progressing     Problem: SKIN/TISSUE INTEGRITY - ADULT  Goal: Skin integrity remains intact  Description: INTERVENTIONS  - Assess and document risk factors for pressure ulcer development  - Assess and document skin integrity  - Monitor for areas of redness and/or skin breakdown  - Initiate interventions, skin care algorithm/standards of care as needed  Outcome: Not Progressing  Goal: Incision(s), wounds(s) or drain site(s) healing without S/S of infection  Description: INTERVENTIONS:  - Assess and document risk factors for pressure ulcer development  - Assess and document skin integrity  - Assess and document dressing/incision, wound bed, drain sites and surrounding tissue  - Implement wound care per orders  - Initiate isolation precautions as appropriate  - Initiate Pressure Ulcer prevention bundle as indicated  Outcome: Not Progressing     Problem: Safety Risk - Non-Violent Restraints  Goal: Patient will remain free from self-harm  Description: INTERVENTIONS:  - Apply the least restrictive restraint to prevent harm  - Notify patient and family of reasons restraints applied  - Assess for any contributing factors to confusion (electrolyte disturbances, delirium, medications)  - Discontinue any unnecessary medical devices as soon as possible  - Assess the patient's physical comfort, circulation, skin condition, hydration, nutrition and elimination needs   - Reorient and redirection as needed  - Assess for the need to continue restraints  Outcome: Not Progressing

## 2023-07-18 NOTE — PLAN OF CARE
Pt is confused/unable to redirect and attempting to pull out lines/tubes. Restraints ordered by MD and started for safety.      Problem: Safety Risk - Non-Violent Restraints  Goal: Patient will remain free from self-harm  Description: INTERVENTIONS:  - Apply the least restrictive restraint to prevent harm  - Notify patient and family of reasons restraints applied  - Assess for any contributing factors to confusion (electrolyte disturbances, delirium, medications)  - Discontinue any unnecessary medical devices as soon as possible  - Assess the patient's physical comfort, circulation, skin condition, hydration, nutrition and elimination needs   - Reorient and redirection as needed  - Assess for the need to continue restraints  Outcome: Not Progressing

## 2023-07-18 NOTE — CONSULTS
[facility]    PATIENT'S NAME: Eva Avitia   ATTENDING PHYSICIAN: Floresita Yusuf MD   CONSULTING PHYSICIAN: Steven Presley MD   PATIENT ACCOUNT#:   [id number]    LOCATION:  [address] [place] [address] #:   [id number]       YOB: 1930  ADMISSION DATE:       07/16/2023      CONSULT DATE:  07/17/2023    REPORT OF CONSULTATION    REASON FOR CONSULTATION:  Left hip injury. HISTORY OF PRESENT ILLNESS:  The patient is a 80-year-old female, resident of [facility], who apparently fell and injured her left hip. She was brought to the emergency room complaining of left hip pain and increased agitation. No other injuries at the time of the fall. PAST MEDICAL HISTORY:  Breast cancer, COVID-19, esophageal reflux, esophagitis, heart attack, hypertension, hypercholesterolemia, hypothyroid, labyrinthitis, low potassium, neuropathy, osteoarthritis, rectal bleeding, thyroid disease, dementia. MEDICATIONS:  Multiple, listed on the record. ALLERGIES:  Medication allergies:  Beta blockers, metformin, naproxen, adhesive tape. SOCIAL HISTORY:  The patient lives at [facility]. She is a nonsmoker. She is . REVIEW OF SYSTEMS:  Unremarkable. There is no chest pain, abdominal pain, nausea, vomiting, night sweats, fevers, chills, unexplained weight loss. PHYSICAL EXAMINATION:    GENERAL:  An elderly female in mild distress. She is very confrontational, combative, and answering questions. She is resting comfortably in the bed. EXTREMITIES:  Examination of left lower extremity shows the extremity to be shortened and externally rotated. There is tenderness around the hip. The left knee is unremarkable with a knee flexion contracture of approximately 20 to 30 degrees. X-rays of the left hip were reviewed. AP and lateral views demonstrate a displaced intertrochanteric femur fracture. No obvious bony pathology otherwise noted.     ASSESSMENT AND PLAN:  The patient has intertrochanteric femur fracture which is displaced. She has significant dementia. I discussed the treatment plan with her daughter, Tamiko Navarrete, who agreed with surgical treatment of the hip. The risks and benefits of surgery were discussed. Questions were answered. No guarantees were made. Plan to proceed with the surgery today. Dictated By Cindy Bourgeois.  Forest Flannery MD  d: 07/17/2023 18:37:41  t: 07/17/2023 18:53:19  [facility] [id number]  [facility]/

## 2023-07-18 NOTE — CM/SW NOTE
Received MDO for subacute rehab placement. PT/OT evaluations pending. Patient is confused, combative, not appropriate for discharge planning at this time. Patient is from [address] [place] [address], anticipate she will discharge to [facility] SNF pending mentation clearing up. SW/CM to follow    245p: Received call from Dr. Sandra Galeas, family agreeable to hospice. Referral made to [facility] with [facility], [facility] team to reach out to family to arrange a meeting for tomorrow. Per Dr. Sandra Galeas, POLST form to be emailed to daughter/POA who lives [place] of [place]. Spoke w/ [facility] w/ [facility], w/ POA permission patient's son who lives locally can sign the form. [facility] will address POLST during meeting.     Lucy Morales, [address]

## 2023-07-18 NOTE — OPERATIVE REPORT
[facility]    PATIENT'S NAME: Tracy Castillo   ATTENDING PHYSICIAN: Floresita Mckenzie MD   OPERATING PHYSICIAN: Steven Kahn MD   PATIENT ACCOUNT#:   [id number]    LOCATION:  [facility] [address] PACU 5 EMHP 10  MEDICAL RECORD #:   [id number]       YOB: 1930  ADMISSION DATE:       07/16/2023      OPERATION DATE:  07/17/2023    OPERATIVE REPORT      PREOPERATIVE DIAGNOSIS:  Left femur intertrochanteric fracture, displaced. POSTOPERATIVE DIAGNOSIS:  Left femur intertrochanteric fracture, displaced. PROCEDURE:  Left femur intertrochanteric fracture, closed reduction intramedullary fixation with fluoroscopic guidance. ASSISTANT:  Soto Hubbard PA-C. ANESTHESIA:  General.    COMPLICATIONS:  None. BLOOD LOSS:  20 mL. SPECIMEN:  None. DRAINS:  None. INDICATIONS:  Patient is a 72-year-old female with history of left hip pain after a fall. Preoperative physical findings, imaging study showed a displaced intertrochanteric femur fracture. After discussion with the patient and the family the risks and benefits of proceeding with operative treatment of the left hip, the family wished to proceed with surgery. OPERATIVE TECHNIQUE:  The patient was identified in the preoperative holding area. The appropriate consents were obtained. She was taken to the operating room, placed in supine position on the fracture table. After adequate general anesthesia was obtained, a well-padded perineal post was placed. A [place] catheter was inserted using sterile technique. The left upper extremity was padded and draped across the chest.  An arterial line was placed in the left wrist.  Both lower extremities and feet were padded and placed in the foot holders for the fracture table. The left lower extremity was brought into a reduced position by applying axial traction, slight internal rotation to the limb.   The right lower extremity was maximally abducted at the hip to aid in radiographic visualization of the left hip. The left lateral hip and thigh were prepped and draped in a sterile fashion. An AP fluoroscopy image was obtained showing excellent reduction of the fracture on both the AP and lateral view. A longitudinal incision was then made just proximal to the greater trochanter. The fascial layer and tensor fascia whit layer were split longitudinally exposing the greater trochanter. A guide pin was placed in the central portion of the greater trochanter tip. A 17 mm reamer was then used to over-reamed the near cortex. An 11 mm [employer]ynthes trochanteric femoral nail was then passed into position. This was a short nail. The appropriate length spiral blade was placed into the femoral head through the outrigger arm. The appropriate length 5.0 fully-threaded cortical screw was placed in the distal locking hole using the outrigger arm. AP and lateral fluoroscopic images confirmed excellent placement of the hardware, excellent reduction of the fracture. The spiral blade was placed within 10 mm of the subchondral bone on both the AP and lateral images. The wounds were copiously irrigated and closed in layers using 0 Vicryl sutures, 2-0 Vicryl sutures, and skin staples. Sterile dressing was applied. The patient's anesthesia was reversed. She was extubated and taken to recovery room in stable condition. All sponge and instrument counts were reported as correct. The attending physician, Dr. Roberto Washington, was present and performed all critical portions of the procedure. There were no complications. The first assistant was medically necessary for the surgery. She assisted with patient positioning, retraction of soft tissues for accurate placement of the implant. She also assisted with hemostasis and wound closure. Without the aid of the assistant, the surgical procedure would not have been possible. Dictated By Hunter Hermosillo.  Roberto Washington MD  d: 07/17/2023 18:34:03  t: 07/17/2023 [facility] [id number]  O/

## 2023-07-18 NOTE — SLP NOTE
ADULT SWALLOWING EVALUATION    ASSESSMENT    ASSESSMENT/OVERALL IMPRESSION:  PPE REQUIRED. THIS THERAPIST WORE GLOVES, DROPLET MASK, AND GOGGLES FOR DURATION OF EVALUATION. HANDS WASHED UPON ENTRANCE/EXIT. Tejal Led SLP BSSE orders received and acknowledged. A swallow evaluation warranted as pt at risk for aspiration d/t significant agitation and RN dysphagia screen unable to be completed. Pt afebrile with clear vocal quality, on 3L/Min, with oxygen saturation at 97. Pt remains in bilateral wrist restraints due to combative behaviors. Pt with prior hx of dysphagia at [facility] in which last recommended diet was regular/thin consistencies following CABG in 2016. Pt positioned upright in bed. Oral mech exam attempted, however, pt unable to follow 1 step commands. Pt with impaired oral acceptance and bilabial seal as evidenced by anterior spillage with additional expectoration of trials. Pt with no obvious bolus propulsion. No swallow response or hyolaryngeal elevation/excursion identified throughout evaluation. Pt began screaming and thrashing in bed. Pt orally defensive and not safe for continued trials. Oxygen status remained >96 t/o the entire evaluation. At this time, pt presents with moderate oral dysphagia and probable pharyngeal dysfunction. Recommend strict NPO with oral cares 3x/daily (as able). Results and recommendations reviewed with RN. FCM SCORE: 1/7       PLAN: SLP to reassess to determine safest/least restrictive diet and/or further dysphagia goals.      RECOMMENDATIONS   Diet Recommendations - Solids: NPO  Diet Recommendations - Liquids: NPO      Compensatory Strategies Recommended:  (n/a)  Aspiration Precautions:  (n/a)  Medication Administration Recommendations: Non-oral  Treatment Plan/Recommendations: SLP to reassess  Discharge Recommendations/Plan: Undetermined    HISTORY   MEDICAL HISTORY  Reason for Referral: R/O aspiration;RN dysphagia screen    Problem List  Principal Problem:    Closed fracture of left hip, initial encounter [facility])  Active Problems:    Dementia with agitation (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 Excision 2013    Neuropathy 06/16/2009    Onychogryphosis 06/16/2009    Osteoarthrosis, unspecified whether generalized or localized, unspecified site     Osteopenia 2004    Other and unspecified hyperlipidemia     Rectal bleeding 2014    Thyroid disease     Type II or unspecified type diabetes mellitus without mention of complication, not stated as uncontrolled     Unspecified essential hypertension        Prior Living Situation: Assisted living  Diet Prior to Admission: Regular; Thin liquids  Precautions: Aspiration; Restraints    Patient/Family Goals: Pt's family and RN unable to get pt to take PO    SWALLOWING HISTORY  Current Diet Consistency: Regular; Thin liquids  Dysphagia History:   BSE 11/14/16: puree/mildly thick - later upgraded to regular/thin    Imaging Results:     CXR 7/15/23:  CONCLUSION:   1. Post CABG changes again noted. No acute cardiopulmonary process. 2. Lesser incidental findings as above. Dictated by (CST): Abby Sparks MD on 7/15/2023 at 7:05 PM       Finalized by (CST): Abby Sparks MD on 7/15/2023 at 7:07 PM       CT BRAIN 7/16/23:  CONCLUSION: No acute finding. Dictated by (CST): Everardo Parkinson MD on 7/16/2023 at 12:15 PM       Finalized by (CST): Everardo Parkinson MD on 7/16/2023 at 12:16 PM       CHEST CT 7/17/23:  CONCLUSION:   1.  No evidence of acute pulmonary embolism to the level of the first order subsegmental pulmonary artery branches. 2. Indeterminate scattered opacities at the left lung base, which may reflect atelectasis or other process. Consider a follow-up examination in three months to ascertain stability or resolution. 3. Right middle lobe atelectasis/volume loss, with or without superimposed infection. Overall morphology does not appear significantly changed since 2011, suggesting a chronic process. 4. Dilatation of the main pulmonary artery trunk may relate to underlying pulmonary hypertension. 5. Postthoracotomy chest with prior CABG. 6. Pectus excavatum deformity. 7. Sequela of remote granulomatous disease. 8. Lesser incidental findings as above. A preliminary report was issued by the [address] [place] [address] Radiology teleradiology service. There are no major discrepancies. Dictated by (CST): Minda Galeazzi, MD on 7/17/2023 at 12:56 PM       Finalized by (CST): Minda Galeazzi, MD on 7/17/2023 at 1:06 PM          OBJECTIVE   ORAL MOTOR EXAMINATION  Dentition: Functional  Symmetry: Within Functional Limits  Strength: Unable to assess     Range of Motion: Unable to assess  Rate of Motion: Unable to assess    Voice Quality: Clear  Respiratory Status: Supplemental O2;Nasal cannula  Consistencies Trialed: Nectar thick liquids  Method of Presentation: Staff/Clinician assistance;Spoon  Patient Positioning: Upright;Midline    Oral Phase of Swallow: Impaired  Bolus Retrieval: Impaired  Bilabial Seal: Impaired  Bolus Formation: Impaired  Bolus Propulsion: Impaired          Pharyngeal Phase of Swallow: Impaired           (Please note: Silent aspiration cannot be evaluated clinically.  Videofluoroscopic Swallow Study is required to rule-out silent aspiration.)    Esophageal Phase of Swallow: No complaints consistent with possible esophageal involvement      GOALS  Goal #1 SLP to reassess to determine safest/least restrictive diet and/or further dysphagia goals. In Progress   Goal #2 Pt to remain NPO and oral cares to be completed as able. In Progress     FOLLOW UP  Treatment Plan/Recommendations: SLP to reassess  Number of Visits to Meet Established Goals: 1  Follow Up Needed (Documentation Required): Yes  SLP Follow-up Date: 07/19/23    Thank you for your referral.   If you have any questions, please contact Calli Dubois, [facility]  Speech Language Pathologist  Phone Number [phone]

## 2023-07-18 NOTE — PHYSICAL THERAPY NOTE
Chart reviewed . Conversation with RN . Pt does not appear with readiness for therapy activity due to current status and confusion ---   per RN dtr is requesting possible hospice consult. Therapy will hold for today and follow up tomorrow pending pt status and family goals of care .    Attempt x 1

## 2023-07-18 NOTE — OCCUPATIONAL THERAPY NOTE
Chart reviewed and spoke with RN. Pt is confused, agitated, and combative, and yelling out.l Pt is not appropriate for OT eval this date. Will HOLD and continue to follow. Per RN, dtr is requesting possible hospice consult.

## 2023-07-19 PROBLEM — I67.2 CEREBRAL ATHEROSCLEROSIS: Status: ACTIVE | Noted: 2023-01-01

## 2023-07-19 NOTE — PROGRESS NOTES
visited pt per consult from [address] in with PT nurse before visit. Pt was singing before I entered, and when I sat down, she immediately became agitated with my presence and let me know. I assessed that the pt did not want or need any  support at this time and politely ended the visit. Pt may respond differently. If pt requests  support  call #[id number]. Rev.  Conner Lawton   07/19/23 [id number]   Clinical Encounter Type   Visited With Patient   Routine Visit Introduction   Continue Visiting No   Taxonomy   Intended Effects Preserve dignity and respect   Methods Demonstrate acceptance;Collaborate with care team member   Interventions Active listening;Explain  role

## 2023-07-19 NOTE — PLAN OF CARE
Patient is disoriented and confused. On restraints for safety. On 3L of oxygen. Refusing oral care. Zyprexa given x1 for agitation. Morphine given x1 for pain. Surgery site dressing looks clean, dry, and intact. Bed is locked and in lowest position. Safety measures maintained. Frequent nursing rounds made. Problem: Patient Centered Care  Goal: Patient preferences are identified and integrated in the patient's plan of care  Description: Interventions:  - What would you like us to know as we care for you?  From [facility] and recently moved to assisted living  - Provide timely, complete, and accurate information to patient/family  - Incorporate patient and family knowledge, values, beliefs, and cultural backgrounds into the planning and delivery of care  - Encourage patient/family to participate in care and decision-making at the level they choose  - Honor patient and family perspectives and choices  Outcome: Progressing     Problem: Patient/Family Goals  Goal: Patient/Family Long Term Goal  Description: Patient's Long Term Goal: go back to [facility]    Interventions:  -ortho consult  -therapy  -medications  - See additional Care Plan goals for specific interventions  Outcome: Progressing  Goal: Patient/Family Short Term Goal  Description: Patient's Short Term Goal: No agitation    Interventions:   - medications  -psych consult  - See additional Care Plan goals for specific interventions  Outcome: Progressing     Problem: PAIN - ADULT  Goal: Verbalizes/displays adequate comfort level or patient's stated pain goal  Description: INTERVENTIONS:  - Encourage pt to monitor pain and request assistance  - Assess pain using appropriate pain scale  - Administer analgesics based on type and severity of pain and evaluate response  - Implement non-pharmacological measures as appropriate and evaluate response  - Consider cultural and social influences on pain and pain management  - Manage/alleviate anxiety  - Utilize distraction and/or relaxation techniques  - Monitor for opioid side effects  - Notify MD/LIP if interventions unsuccessful or patient reports new pain  - Anticipate increased pain with activity and pre-medicate as appropriate  Outcome: Progressing     Problem: DISCHARGE PLANNING  Goal: Discharge to home or other facility with appropriate resources  Description: INTERVENTIONS:  - Identify barriers to discharge w/pt and caregiver  - Include patient/family/discharge partner in discharge planning  - Arrange for needed discharge resources and transportation as appropriate  - Identify discharge learning needs (meds, wound care, etc)  - Arrange for interpreters to assist at discharge as needed  - Consider post-discharge preferences of patient/family/discharge partner  - Complete POLST form as appropriate  - Assess patient's ability to be responsible for managing their own health  - Refer to Case Management Department for coordinating discharge planning if the patient needs post-hospital services based on physician/LIP order or complex needs related to functional status, cognitive ability or social support system  Outcome: Progressing     Problem: METABOLIC/FLUID AND ELECTROLYTES - ADULT  Goal: Glucose maintained within prescribed range  Description: INTERVENTIONS:  - Monitor Blood Glucose as ordered  - Assess for signs and symptoms of hyperglycemia and hypoglycemia  - Administer ordered medications to maintain glucose within target range  - Assess barriers to adequate nutritional intake and initiate nutrition consult as needed  - Instruct patient on self management of diabetes  Outcome: Progressing  Goal: Electrolytes maintained within normal limits  Description: INTERVENTIONS:  - Monitor labs and rhythm and assess patient for signs and symptoms of electrolyte imbalances  - Administer electrolyte replacement as ordered  - Monitor response to electrolyte replacements, including rhythm and repeat lab results as appropriate  - Fluid restriction as ordered  - Instruct patient on fluid and nutrition restrictions as appropriate  Outcome: Progressing     Problem: SKIN/TISSUE INTEGRITY - ADULT  Goal: Skin integrity remains intact  Description: INTERVENTIONS  - Assess and document risk factors for pressure ulcer development  - Assess and document skin integrity  - Monitor for areas of redness and/or skin breakdown  - Initiate interventions, skin care algorithm/standards of care as needed  Outcome: Progressing  Goal: Incision(s), wounds(s) or drain site(s) healing without S/S of infection  Description: INTERVENTIONS:  - Assess and document risk factors for pressure ulcer development  - Assess and document skin integrity  - Assess and document dressing/incision, wound bed, drain sites and surrounding tissue  - Implement wound care per orders  - Initiate isolation precautions as appropriate  - Initiate Pressure Ulcer prevention bundle as indicated  Outcome: Progressing     Problem: Safety Risk - Non-Violent Restraints  Goal: Patient will remain free from self-harm  Description: INTERVENTIONS:  - Apply the least restrictive restraint to prevent harm  - Notify patient and family of reasons restraints applied  - Assess for any contributing factors to confusion (electrolyte disturbances, delirium, medications)  - Discontinue any unnecessary medical devices as soon as possible  - Assess the patient's physical comfort, circulation, skin condition, hydration, nutrition and elimination needs   - Reorient and redirection as needed  - Assess for the need to continue restraints  Outcome: Progressing

## 2023-07-19 NOTE — DISCHARGE SUMMARY
[facility]    Discharge Summary    Marion Dominguez Patient Status:  Inpatient    1930 MRN [id number]   Location [facility] 2W/SW Attending No att. providers found   Hosp Day # 3 PCP Jordon Burgos MD     Date of Admission: 2023   Date of Discharge: 2023 11:58 AM    Admitting Diagnosis: Closed fracture of left hip, initial encounter ([facility] [address] [facility]    Disposition: [facility] Discharge Diagnoses: Acute left hip fracture     Lace+ Score: 70  59-90 High Risk  29-58 Medium Risk  0-28   Low Risk. TCM Follow-Up Recommendation:  LACE < 29: Low Risk of readmission after discharge from the hospital. No TCM follow-up needed. Discharge Diagnosis: . Principal Problem:    Closed fracture of left hip, initial encounter [facility])  Active Problems:    Dementia with agitation ([facility] [address]    Delirium superimposed on dementia    Episodic mood disorder [facility])      Hospital Course:   Reason for Admission:   Per Dr. Jonny Wilkins  This is a 80year oldfemale brought in from facility due to hip pain after a fall. Apparently the patient had fallen out of bed. She has had increasing agitation over the last few days and was seen in the emergency room recently for this. It is suspected that her dementia has been advancing. Pain relatively well controlled when not moving. Pain exacerbated by activity. Discharge Physical Exam:   Physical Exam:    General: No acute distress. Respiratory: Clear to auscultation bilaterally. No wheezes. No rhonchi. Cardiovascular: S1, S2. Regular rate and rhythm. No murmurs, rubs or gallops. Abdomen: Soft, nontender, nondistended. Positive bowel sounds. No rebound or guarding. Neurologic: No focal neurological deficits. Musculoskeletal: Moves all extremities. Hospital Course:     # Acute L hip intertrochanteric fracture   - Orthopedic surgery on consult.   - s/p L femur closed reduction IM fixation .   - Pain control.   - patient is now admitted under hospice      # Atrial fibrillation with RVR, new onset.   - NSR now.   - RRT was called 7/17.   - Tele. - PO amiodarone if able. - IV diltiazem PRN   - cardiology on consult. - ECHO pending pt too agitated to complete.   - TSH ok   - CT chest no evidence of PE.   - patient is now under hospice  - supportive care     # Dementia with delirium   - Psychiatry on consult. - Haldol PRN  - zyprexa 5mg IM q8hrs PRN     # Hypernatremia  # Hypercalcemia - resolved. - change IVF to D5 100ml/hour   - rpt labs in AM.   - stop hydrochlorothiazide. # CKD III  - baseline creat 1.3-1.5   - monitor   - avoid nephrotoxic meds. # DM II  - Hba1c   - ISS      Other medical problems  H/o breast CA  GERD  HTN  Hyperlipidemia  Hypothyroidism      Chronic thrombocytopenia. Stable. H/o CAD s/p CABG x 3v     MDM: High, I personally reviewed the available laboratories, imaging including CT chest XR hip. I discussed the case with rn and cardiology. I ordered laboratories, studies including INR, AM labs, ECHO and TSH. I adjusted medications including BP meds. Medical decision making high, risk is high       Complications: none    Consultants         Provider   Role Specialty     Marysol Perez MD  Consulting Physician HOSPITALIST     Anne Kyle MD  Consulting Physician Psychiatry     Tiara Pierce MD  Consulting Physician HOSPITALIST     Donya Turner MD  Consulting Physician CARDIOLOGY          Surgical Procedures       Case IDs Date Procedure Surgeon Location Status    [id number] 7/17/23 left femur intramedullary nailing Keyla Calderon MD [facility] OR Comp              Discharge Plan:   Discharge Condition: Stable    Discharge Medication List as of 7/19/2023 11:58 AM    Home Meds - Unchanged    insulin glargine 100 UNIT/ML Subcutaneous Solution  Inject 18 Units into the skin nightly., Historical    DULoxetine 30 MG Oral Cap DR Particles  Take 1 capsule (30 mg total) by mouth daily. , Historical    LORazepam (ATIVAN) 0.5 MG Oral Tab  Take 1 tablet (0.5 mg total) by mouth every 6 (six) hours as needed for Anxiety., Normal, Disp-60 tablet, R-3    metFORMIN  MG Oral Tablet 24 Hr  Take 1 tablet (500 mg total) by mouth daily with breakfast., Normal, Disp-90 tablet, R-1    GLIMEPIRIDE 2 MG Oral Tab  TAKE 1 TABLET(2 MG) BY MOUTH DAILY WITH BREAKFAST, Normal, Disp-90 tablet, R-1ZERO refills remain on this prescription. Your patient is requesting advance approval of refills for this medication to [address] 10 MG Oral Tab  TAKE 1 TABLET(10 MG) BY MOUTH DAILY, Normal, Disp-90 tablet, R-1ZERO refills remain on this prescription. Your patient is requesting advance approval of refills for this medication to PREVENT ANY MISSED DOSES    levothyroxine 75 MCG Oral Tab  Take 1 tablet (75 mcg total) by mouth before breakfast., Normal, Disp-90 tablet, R-302/21/2022 1:40:32 PM    DILTIAZEM  MG Oral Capsule SR 24 Hr  TAKE 1 CAPSULE(240MG) BY MOUTH DAILY., Normal, Disp-90 capsule, R-3    PRAVASTATIN 40 MG Oral Tab  TAKE 1 TABLET(40 MG TOTAL) BY MOUTH NIGHTLY., Normal, Disp-90 tablet, R-3ZERO refills remain on this prescription. Your patient is requesting advance approval of refills for this medication to PREVENT ANY MISSED DOSES    amLODIPine 5 MG Oral Tab  Take 1 tablet (5 mg total) by mouth in the morning., Normal, Disp-90 tablet, R-3    lisinopril-hydroCHLOROthiazide 20-25 MG Oral Tab  Take 1 tablet by mouth 2 (two) times a day., Normal, Disp-180 tablet, R-309/13/2021 11:44:34 AM    Insulin Pen Needle (BD PEN NEEDLE SHADE U/F) 32G X 4 MM Does not apply Misc  USE AS DIRECTED, Normal, Disp-100 each, R-3DX E11.42 type 2 DM with insulin use    risedronate (ACTONEL) 35 MG Oral Tab  Take 1 tablet (35 mg total) by mouth every 7 days. , Normal, Disp-12 tablet, R-3    docusate sodium 250 MG Oral Cap  Take 1 capsule (250 mg total) by mouth daily. , Historical    cetirizine 10 MG Oral Tab  Take 1 tablet (10 mg total) by mouth daily. , Historical    OYSTER SHELL CALCIUM/D 500-200 MG-UNIT Oral Tab  TAKE 1 TABLET BY MOUTH DAILY., Normal, Disp-90 tablet, R-309/13/2021 11:44:47 AM* * N O T I C E * * Last quantity doesn't match original quantity    Cholecalciferol (VITAMIN D) 1000 units Oral Tab  1000 units   Sig-  1000units orally daily. , Print Script, Disp-100 tablet, R-3    Glucose Blood (CHINMAY CONTOUR NEXT TEST VI)  TEST BLOOD SUGAR THREE TIMES DAILY AS DIRECTED, Historical, R-99    aspirin 81 MG Oral Chew Tab  Chew 1 tablet (81 mg total) by mouth daily. , Normal, Disp-90 tablet, R-0    Blood Gluc Meter Disp-Strips Does not apply Device  OK to use Contour test strips 3 times per day, Normal, Disp-300 Device, R-3Match strips to pt device Dx: 250.00 - insulin using    Lancets Does not apply Misc  Test three times daily as directed by chris., Normal, Disp-300 each, R-3Match lancet to pt device Dx: 250.00 - insulin using    Multiple Vitamins-Minerals (CENTRUM SILVER) Oral Tab  Take 1 tablet by mouth daily. CENTRUM SILVER (unknown strength) , Historical    Pyridoxine HCl (VITAMIN B-6) 50 MG Oral Tab  Take  by mouth. 1 tab daily, Historical              Discharge Diet: none     Discharge Activity: As tolerated       Discharge Medications        CONTINUE taking these medications        Instructions Prescription details   BD Pen Needle Adilia U/F 32G X 4 MM Misc  Generic drug: Insulin Pen Needle      USE AS DIRECTED   Quantity: 100 each  Refills: 3     Blood Gluc Meter Disp-Strips Umu      OK to use Contour test strips 3 times per day   Quantity: 300 Device  Refills: 3     Lancets Misc      Test three times daily as directed by pamelaan.    Quantity: 300 each  Refills: 3            STOP taking these medications      amLODIPine 5 MG Tabs  Commonly known as: Norvasc        aspirin 81 MG Chew        CHINMAY CONTOUR NEXT TEST VI        Centrum Silver Tabs        cetirizine 10 MG Tabs  Commonly known as: ZyrTEC dilTIAZem  MG Cp24  Commonly known as: CardIZEM CD        docusate sodium 250 MG Caps  Commonly known as: COLACE        DULoxetine 30 MG Cpep  Commonly known as: Cymbalta        glimepiride 2 MG Tabs  Commonly known as: Amaryl        insulin glargine 100 UNIT/ML Soln  Commonly known as: Lantus        Jardiance 10 MG Tabs  Generic drug: empagliflozin        levothyroxine 75 MCG Tabs  Commonly known as: Synthroid        lisinopril-hydroCHLOROthiazide 20-25 MG Tabs  Commonly known as: Zestoretic        LORazepam 0.5 MG Tabs  Commonly known as: Ativan        metFORMIN  MG Tb24  Commonly known as: Glucophage XR        Oyster Shell Calcium/D 500-200 MG-UNIT Tabs        pravastatin 40 MG Tabs  Commonly known as: Pravachol        pyridoxine 50 MG Tabs  Commonly known as: Vitamin B6        risedronate 35 MG Tabs  Commonly known as: Actonel        Vitamin D 1000 units Tabs                 Follow up:        Follow up Labs and imaging:         Time spent:  > 30 minutes    Ronan Cruz MD  7/19/2023

## 2023-07-19 NOTE — HOSPICE RN NOTE
[facility] met with pt son Kwadwo Lema and daughter Glen Barrientos to discuss hospice philosophy and goals of care. Discussed inpatient hospice setting as well as routine level of care. Pt is appropriate for inpatient hospice to help manage pain and severe agitation. Pt is currently restrained due to trying to remove her IV lines and for safety. Pt is getting IVP prn medications to help manage agitation and pain. Plan is to start pt on IVP medications to help manage her symptoms. Discussed that if pt stabilizes she will need to discharge to routine level of care, family is understanding of this. POC discussed with Dr. Magen Yi, hospice medical director Dr. Ghazala Crain, and Carlos Spaulding, [address] [place] [address]. Chart flipped, comfort measures entered.

## 2023-07-19 NOTE — HOSPICE RN NOTE
[facility] Inpatient follow up: pt visited at bedside, remains awake, was speaking/singing while approaching room. Per staff pt does this often but appears pleasant in doing so. Pt still agitated with cares, pt reported she felt cold, this writer attempted to replace blanket and pt swatted away hand. Restraints remain in place. Pt removed 02. No medications given for agitation yet. Oral PRN medications ordered for management if symptoms not severe enough to warrant IV. RH to follow tomorrow. Please call with questions/concerns.     Noel Franklin RN, BSN  [facility] Nurse Liaison  Office: [phone] or After Hours: [phone]

## 2023-07-19 NOTE — PLAN OF CARE
Bilateral soft wrist restraints in place. Patient confused, attempting to discontinue lines. Safety maintained.      Problem: Safety Risk - Non-Violent Restraints  Goal: Patient will remain free from self-harm  Description: INTERVENTIONS:  - Apply the least restrictive restraint to prevent harm  - Notify patient and family of reasons restraints applied  - Assess for any contributing factors to confusion (electrolyte disturbances, delirium, medications)  - Discontinue any unnecessary medical devices as soon as possible  - Assess the patient's physical comfort, circulation, skin condition, hydration, nutrition and elimination needs   - Reorient and redirection as needed  - Assess for the need to continue restraints  Outcome: Not Progressing

## 2023-07-19 NOTE — PROGRESS NOTES
07/19/23 0800   VISIT TYPE   SLP Inpatient Visit Type (Documentation Required) Attempted Evaluation   FOLLOW UP/PLAN   Follow Up Needed (Documentation Required) Yes   SLP Follow-up Date 07/20/23     SLP attempt this AM. Per RN, pt with decreased DONOVAN and not appropriate for repeat evaluation at this time. SLP to follow up as pt alert/appropriate. Thank you. Summer Woodruff [facility]  Speech Language Pathologist  Phone Number Ext. [phone]

## 2023-07-19 NOTE — PLAN OF CARE
Patient is now on hospice. Patient is calm, but talking to self and states she is talking to others in the room. Patient allowed oral care one time, otherwise refused. Patient becomes agitated with stimulation. Bilateral soft wrist restraints remain in place as patient attempts to discontinue lines. Bed in lowest position, safety maintained. Addendum: spoke with patient's daughter, updated on patient condition. Daughter states that she prefers that patient is not given medication to sedate her or help her sleep as long as she is pleasant and not agitated. Patient's daughter aware bilateral wrist restraints remain in place, and agrees with necessity.        Problem: Safety Risk - Non-Violent Restraints  Goal: Patient will remain free from self-harm  Description: INTERVENTIONS:  - Apply the least restrictive restraint to prevent harm  - Notify patient and family of reasons restraints applied  - Assess for any contributing factors to confusion (electrolyte disturbances, delirium, medications)  - Discontinue any unnecessary medical devices as soon as possible  - Assess the patient's physical comfort, circulation, skin condition, hydration, nutrition and elimination needs   - Reorient and redirection as needed  - Assess for the need to continue restraints  Outcome: Not Progressing     Problem: PAIN - ADULT  Goal: Verbalizes/displays adequate comfort level or patient's stated pain goal  Description: INTERVENTIONS:  - Encourage pt to monitor pain and request assistance  - Assess pain using appropriate pain scale  - Administer analgesics based on type and severity of pain and evaluate response  - Implement non-pharmacological measures as appropriate and evaluate response  - Consider cultural and social influences on pain and pain management  - Manage/alleviate anxiety  - Utilize distraction and/or relaxation techniques  - Monitor for opioid side effects  - Notify MD/LIP if interventions unsuccessful or patient reports new pain  - Anticipate increased pain with activity and pre-medicate as appropriate  Outcome: Progressing     Problem: SAFETY ADULT - FALL  Goal: Free from fall injury  Description: INTERVENTIONS:  - Assess pt frequently for physical needs  - Identify cognitive and physical deficits and behaviors that affect risk of falls.   - [place] fall precautions as indicated by assessment.  - Educate pt/family on patient safety including physical limitations  - Instruct pt to call for assistance with activity based on assessment  - Modify environment to reduce risk of injury  - Provide assistive devices as appropriate  - Consider OT/PT consult to assist with strengthening/mobility  - Encourage toileting schedule  Outcome: Progressing     Problem: DISCHARGE PLANNING  Goal: Discharge to home or other facility with appropriate resources  Description: INTERVENTIONS:  - Identify barriers to discharge w/pt and caregiver  - Include patient/family/discharge partner in discharge planning  - Arrange for needed discharge resources and transportation as appropriate  - Identify discharge learning needs (meds, wound care, etc)  - Arrange for interpreters to assist at discharge as needed  - Consider post-discharge preferences of patient/family/discharge partner  - Complete POLST form as appropriate  - Assess patient's ability to be responsible for managing their own health  - Refer to Case Management Department for coordinating discharge planning if the patient needs post-hospital services based on physician/LIP order or complex needs related to functional status, cognitive ability or social support system  Outcome: Progressing

## 2023-07-19 NOTE — HOSPICE RN NOTE
[facility] inpatient nursing rounds: pt visited at bedside, no family present at this time. Pt is awake, confused. Calm and resting at times, gets more agitated/restless with cares/assessments. Per RN no PRN medications given yet, still restrained. Discussed with ALY Pichardo use of PRN thorazine and ativan. Verbalized understanding. [place]/IV in place, 4L 02 via nc in. Large bruises to bilat LE and L hip area noted. Staples viewed, PREET. Restraints remain in place, encouraged to try to remove as able. RH to continue to follow. Please call with questions/concerns.      Juan Lincoln RN, BSN  [facility] Nurse Liaison  Office: [phone] or After Hours: [phone]

## 2023-07-19 NOTE — PHYSICAL THERAPY NOTE
PHYSICAL THERAPY EVALUATION - INPATIENT     Room Number: 206/206-A  Evaluation Date: 7/19/2023  Type of Evaluation: Initial              Reason for Therapy: Mobility Dysfunction and Discharge Planning    PHYSICAL THERAPY ASSESSMENT     Patient is a 80year old female admitted 7/19/2023 for hip fracture post ORIF. Patient's current functional deficits include bed mobility, transfers, and gait, which are below the patient's pre-admission status. Patient is total care this date, R and L rolling trial max A, agitated during bed level assessment kicking and threatening to bite therapist.  Unable to perform EOB and OOB mobility. The patient's AMPAC 100%  has been calculated based on documentation in the [facility] '6 clicks' Inpatient Basic Mobility Short Form. Research supports that patients with this level of impairment may benefit from LTAC. Patient will benefit from continued IP PT services to address these deficits in preparation for discharge. DISCHARGE RECOMMENDATIONS (plan is for hospice)        PLAN      Patient to go hospice per chart. PHYSICAL THERAPY MEDICAL/SOCIAL HISTORY     History related to current admission: dementia      Problem List  Active Problems:    Cerebral atherosclerosis      HOME SITUATION    From [facility] recently moved into new facility     Prior Level of [place]:Unclear baseline     SUBJECTIVE  \"This is bullshit. \"     PHYSICAL THERAPY EXAMINATION     OBJECTIVE          WEIGHT BEARING RESTRICTION      No restrictions           PAIN ASSESSMENT      No restrictions        COGNITION  Overall Cognitive Status:  not oriented, agitated     FUNCTIONAL ABILITY STATUS       Bed Mobility: max A     Transfers: NT     Exercise/Education Provided:  Bed mobility  Body mechanics  Functional activity tolerated  Gait training  Transfer training         CURRENT GOALS    Goals to be met by: 8/10  Patient Goal Patient's self-stated goal is: unstated    Goal #1 Patient is able to demonstrate supine - sit EOB @ level: independent     Goal #1   Current Status    Goal #2 Patient is able to demonstrate transfers Sit to/from Stand at assistance level: supervision with walker - rolling     Goal #2  Current Status    Goal #3 Patient is able to ambulate 15 feet with assist device: walker - rolling at assistance level: minimum assistance   Goal #3   Current Status            Goal #5 Patient to demonstrate independence with home activity/exercise instructions provided to patient in preparation for discharge.    Goal #5   Current Status    Goal #6    Goal #6  Current Status        Patient Evaluation Complexity Level:  History Moderate - 1 or 2 personal factors and/or co-morbidities   Examination of body systems Moderate - addressing a total of 3 or more elements   Clinical Presentation Moderate - Evolving   Clinical Decision Making Moderate Complexity

## 2023-07-20 NOTE — CM/SW NOTE
REGLA sidhu 7/20/23. The pt appeared unsettled with any outside stimuli. DTR very much approves of comfort ,and morphine drip,as she does not want to see her mother experience any agitation. MSW provided resources for direct cremation, [facility] to inform after she makes a selection.   Azeem Farrell, REGLA  [facility]  [phone]

## 2023-07-20 NOTE — HOSPICE RN NOTE
[facility] Rounds. Residential RN assessment of patient at bedside. Lung sounds diminished throughout upon auscultation. Respirations shallow on room air. Patient asleep, restless with tactile stimuli, PPS 10%. Patient receiving morphine gtt 1mg/hr IV continuous with doses morphine IVP PRN, ativan IVP PRN, and haldol IVP PRN by facility RN for pain, shortness of breath, restlessness, anxiety, and agitation not controlled with oral medications. Update given to ALY Stark who is in agreement with plan of care and will continue to administer, monitor, and titrate IV medications as indicated. [facility] continuing to follow up to determine patient appropriateness for inpatient hospice.      Soco Fuller, [facility]  [phone]  Office: [phone]

## 2023-07-21 NOTE — SIGNIFICANT EVENT
Pt  at [address] [facility]. Resuscitation not attempted as pt was DNR.      Time of Death:  [facility] notified: Antonio Cornejo MD: Ti Ying MD    [facility] notified: Luca Peres #[phone]     contacted if applicable: name and badge # n/a

## 2023-07-21 NOTE — DISCHARGE SUMMARY
[facility]    Discharge Summary    Stephanie Andrade Patient Status:  Inpatient    1930 MRN [id number]   Location [facility] 4W/SW/SE Attending Cecy Plata MD   Hosp Day # 2 PCP Raghav Elizondo MD     Date of Admission: 2023   Date of Discharge: 2023    Hospital Discharge Diagnoses: Acute dementia    Lace+ Score: 58  59-90 High Risk  29-58 Medium Risk  0-28   Low Risk. TCM Follow-Up Recommendation:  LACE < 29: Low Risk of readmission after discharge from the hospital. No TCM follow-up needed. Admitting Diagnosis: dementia  Cerebral atherosclerosis    Disposition:     Discharge Diagnosis: . Active Problems:    Cerebral atherosclerosis      Hospital Course:   Reason for Admission:   Stephanie Andrade is a(n) 80year old female who has a pmh of severe dementia who was admitted after a fall in the nursing home. She was found to have a left hip intertrochanteric fracture. Due to her advancing dementia and commorbidites, she has now been transitioned to hospice care. Patient  today at 9:45 AM.    Discharge Physical Exam:   Physical Exam:    General: No acute distress. Respiratory: Clear to auscultation bilaterally. No wheezes. No rhonchi. Cardiovascular: S1, S2. Regular rate and rhythm. No murmurs, rubs or gallops. Abdomen: Soft, nontender, nondistended. Positive bowel sounds. No rebound or guarding. Neurologic: No focal neurological deficits. Musculoskeletal: Moves all extremities.     Hospital Course:      Cerebral atherosclerosis  Patient is now in hospice  Was on morphine drip 1 mg/hour  Patient  at 6:88 AM    Complications: none            Discharge Plan:   Discharge Condition: Stable    Current Discharge Medication List    Home Meds - Unchanged    Insulin Pen Needle (BD PEN NEEDLE SHADE U/F) 32G X 4 MM Does not apply Misc  USE AS DIRECTED    Blood Gluc Meter Disp-Strips Does not apply Device  OK to use Contour test strips 3 times per day    Lancets Does not apply Misc  Test three times daily as directed by chris. Discharge Diet: NA    Discharge Activity: As tolerated       Discharge Medications        CONTINUE taking these medications        Instructions Prescription details   BD Pen Needle Adilia U/F 32G X 4 MM Misc  Generic drug: Insulin Pen Needle      USE AS DIRECTED   Quantity: 100 each  Refills: 3     Blood Gluc Meter Disp-Strips Umu      OK to use Contour test strips 3 times per day   Quantity: 300 Device  Refills: 3     Lancets Misc      Test three times daily as directed by chris. Quantity: 300 each  Refills: 3              Follow up:        Follow up Labs and imaging:         Time spent:  > 30 minutes    Ayush Davila MD  7/21/2023

## 2023-07-21 NOTE — HOSPICE RN NOTE
[facility] Rounds. Residential RN assessment of patient at bedside. Lung sounds diminished throughout upon auscultation. Respirations slightly labored on room air. Patient awakens to tactile stimuli, restlessness pulling off blankets and clothes, PPS 10%. Patient receiving morphine gtt 1mg/hr IV continuous with doses ativan IVP PRN, haldol IVP PRN, and morphine IVP PRN by facility RN for pain, shortness of breath, restlessness, anxiety, and agitation not controlled with oral medications. Update given to ALY Leonardo and Alison Linares RN who are in agreement with plan of care and will continue to administer, monitor, and titrate IV medications as indicated. [facility] continuing to follow up to determine patient appropriateness for inpatient hospice care.      Angie Dotson, [facility]  [phone]  Office: [phone]

## 2023-07-21 NOTE — PROGRESS NOTES
23 1053   Clinical Encounter Type   Visited With Family; Patient not available   Crisis Visit Death   Referral From Nurse   Scientology Encounters   Spiritual Requests During Visit / Hospitalization Declines  Visit   Family Spiritual Encounters   Family Coping Accepting   Family Participation in Care Often   Taxonomy   Intended Effects Demonstrate caring and concern   Methods Offer support   Interventions Explain  role; Acknowledge current situation     Summary: received call from RN that patient .  visited with daughter and son present, they declined  support. Aleksandar Ask, daughter, completed release log and belongings sheet. Spiritual Care support can be requested via an [employer] consult.    -Chuy Mooney, Chaplain Resident.

## 2023-07-24 ENCOUNTER — TELEPHONE (OUTPATIENT)
Dept: INTERNAL MEDICINE UNIT | Facility: HOSPITAL | Age: 88
End: 2023-07-24

## 2023-07-24 NOTE — TELEPHONE ENCOUNTER
Death certificate completed and signed by Hospitalist MD Dr. Hodan Glez. Submitted via fax to [phone]. Confirmation fax received.

## (undated) DEVICE — GUIDEWIRE SYNT 3.2X400 357.399

## (undated) DEVICE — ENCORE® LATEX MICRO SIZE 8, STERILE LATEX POWDER-FREE SURGICAL GLOVE: Brand: ENCORE

## (undated) DEVICE — DRILL BIT 4.2/3-FLTD CALIB

## (undated) DEVICE — WEBRIL COTTON UNDERCAST PADDING: Brand: WEBRIL

## (undated) DEVICE — 6617 IOBAN II PATIENT ISOLATION DRAPE 5/BX,4BX/CS: Brand: STERI-DRAPE™ IOBAN™ 2

## (undated) DEVICE — BNDG COHESIVE W4INXL5YD TAN E

## (undated) DEVICE — SUT VICRYL 2-0 FS-1 J443H

## (undated) DEVICE — 3M™ MEDITPORE™ SOFT CLOTH TAPE 6 IN X 10 YD 12 ROLLS/CASE 2966: Brand: 3M™ MEDIPORE™

## (undated) DEVICE — SUT VICRYL 0 CP-1 J267H

## (undated) DEVICE — GAMMEX® PI HYBRID SIZE 8, STERILE POWDER-FREE SURGICAL GLOVE, POLYISOPRENE AND NEOPRENE BLEND: Brand: GAMMEX

## (undated) DEVICE — HIP PINNING: Brand: MEDLINE INDUSTRIES, INC.

## (undated) DEVICE — ENCORE® LATEX MICRO SIZE 6.5, STERILE LATEX POWDER-FREE SURGICAL GLOVE: Brand: ENCORE

## (undated) DEVICE — PROXIMATE SKIN STAPLERS (35 WIDE) CONTAINS 35 STAINLESS STEEL STAPLES (FIXED HEAD): Brand: PROXIMATE

## (undated) DEVICE — SOL NACL IRRIG 0.9% 1000ML BTL

## (undated) DEVICE — GAUZE TRAY STERILE 4X4 12PLY

## (undated) DEVICE — 3M™ TEGADERM™ TRANSPARENT FILM DRESSING, 1626W, 4 IN X 4-3/4 IN (10 CM X 12 CM), 50 EACH/CARTON, 4 CARTON/CASE: Brand: 3M™ TEGADERM™

## (undated) DEVICE — PAD,ABDOMINAL,8"X7.5",STERILE,LF,1/PK: Brand: MEDLINE

## (undated) DEVICE — Device

## (undated) NOTE — MR AVS SNAPSHOT
FREDI Lyman  [facility] [address] [address]  [phone]               Thank you for choosing us for your health care visit with Alma Rosa Ruiz MD.  We are glad to serve you and happy to provide you with this summary of your visit.   Pl amiodarone HCl 200 MG Tabs   Take 1 tablet (200 mg total) by mouth 2 (two) times daily with meals. Commonly known as:  PACERONE           ascorbic acid 500 MG Tabs   Take 1 tablet (500 mg total) by mouth 3 (three) times daily.    Commonly known as:  LIBIA Generic drug:  Calcium Carbonate-Vitamin D   Take 1 tablet by mouth daily.            Pantoprazole Sodium 20 MG Tbec   Take 20 mg by mouth every morning before breakfast.   Commonly known as:  PROTONIX           Pravastatin Sodium 40 MG Tabs   TAKE 1 TABLET

## (undated) NOTE — MR AVS SNAPSHOT
FREDI [place]  [address] [address]  [phone]               Thank you for choosing us for your health care visit with Matheus Davalos MD.  We are glad to serve you and happy to provide you with this summary of your visit.   Pl Current Medications          This list is accurate as of: 2/3/17  2:26 PM.  Always use your most recent med list.                amiodarone HCl 200 MG Tabs   Take 1 tablet (200 mg total) by mouth 2 (two) times daily with meals.    Commonly known as: Commonly known as:  PRINIVIL,ZESTRIL           * MIRALAX OR   Take 17 g by mouth daily as needed (as needed for constipation). * PEG 3350 Pack   Take 17 g by mouth daily as needed.    Commonly known as:  MIRALAX           omeprazole 20 MG Cpdr   T (Approximate)    Assoc Dx:  ASHD (arteriosclerotic heart disease) [I25.10], S/P CABG x 3 [Z95.1]                 Follow-up Instructions     Return in about 1 month (around 3/3/2017) for Office visit.       Scheduling Instructions     Friday February 03, 201 Eat plenty of protein, keep the fat content low Sugars:  sodas and sports drinks, candies and desserts   Eat plenty of low-fat dairy products High fat meats and dairy   Choose whole grain products Foods high in sodium   Water is best for hydration Fast froylan

## (undated) NOTE — ED AVS SNAPSHOT
[facility] Emergency Department    [place] [address]    Phone:  [phone]    Fax:  [address]   MRN: [id number]    Department:  [facility] Emergency Department   Date of Visit:  2/ You can get these medications from any pharmacy     Bring a paper prescription for each of these medications    - PEG 3350 Pack              Discharge Instructions       Return for any worsening or concern    Follow up with your physician or the one provid [facility] Emergency Department. Follow-up care is at the discretion of that Physician.   If you need additional assistance selecting a physician, you may call the [employer] Physician Referral and Class Registration line at [phone] - If you are a smoker or have smoked in the last 12 months, we encourage you to explore options for quitting.     - If you have concerns related to behavioral health issues or thoughts of harming yourself, contact [facility] a

## (undated) NOTE — LETTER
Hospital Discharge Documentation  Patient  under inpatient Hospice level of care. From: [address] Hospitalist's Office  Phone: [phone]    Patient discharged time/date: 2023 12:48 PM  Patient discharge disposition:         Discharge Summary - D/C Summary        Discharge Summary signed by Juhi Mahoney MD at 2023 10:08 AM  Version 1 of 1      Author: Juhi Mahoney MD Service: Internal Medicine Author Type: Physician    Filed: 2023 10:08 AM Date of Service: 2023 10:04 AM Status: Signed    : Juhi Mahoney MD (Physician)         [facility]    Discharge Summary    Marion Dominguez Patient Status:  Inpatient    1930 MRN [id number]   Location [facility] 4W/SW/SE Attending Juhi Mahoney MD   Hosp Day # 2 PCP Jordon Burgos MD     Date of Admission: 2023   Date of Discharge: 2023    Hospital Discharge Diagnoses: Acute dementia    Lace+ Score: 58  59-90 High Risk  29-58 Medium Risk  0-28   Low Risk. TCM Follow-Up Recommendation:  LACE < 29: Low Risk of readmission after discharge from the hospital. No TCM follow-up needed. Admitting Diagnosis: dementia  Cerebral atherosclerosis    Disposition: Home    Discharge Diagnosis: . Active Problems:    Cerebral atherosclerosis      Hospital Course:   Reason for Admission:   Marion Dominguez is a(n) 80year old female who has a pmh of severe dementia who was admitted after a fall in the nursing home. She was found to have a left hip intertrochanteric fracture. Due to her advancing dementia and commorbidites, she has now been transitioned to hospice care. Patient  today at 9:45 AM.    Discharge Physical Exam:   Physical Exam:    General: No acute distress. Respiratory: Clear to auscultation bilaterally. No wheezes. No rhonchi. Cardiovascular: S1, S2. Regular rate and rhythm. No murmurs, rubs or gallops. Abdomen: Soft, nontender, nondistended.   Positive bowel sounds. No rebound or guarding. Neurologic: No focal neurological deficits. Musculoskeletal: Moves all extremities. Hospital Course:      Cerebral atherosclerosis  Patient is now in hospice  Was on morphine drip 1 mg/hour  Patient  at 7:12 AM    Complications: none            Discharge Plan:   Discharge Condition: Stable    Current Discharge Medication List    Home Meds - Unchanged    Insulin Pen Needle (BD PEN NEEDLE ADILIA U/F) 32G X 4 MM Does not apply Misc  USE AS DIRECTED    Blood Gluc Meter Disp-Strips Does not apply Device  OK to use Contour test strips 3 times per day    Lancets Does not apply Misc  Test three times daily as directed by physcian. Discharge Diet: NA    Discharge Activity: As tolerated       Discharge Medications        CONTINUE taking these medications        Instructions Prescription details   BD Pen Needle Adilia U/F 32G X 4 MM Misc  Generic drug: Insulin Pen Needle      USE AS DIRECTED   Quantity: 100 each  Refills: 3     Blood Gluc Meter Disp-Strips Umu      OK to use Contour test strips 3 times per day   Quantity: 300 Device  Refills: 3     Lancets Misc      Test three times daily as directed by physcian. Quantity: 300 each  Refills: 3              Follow up:        Follow up Labs and imaging:         Time spent:  > 30 minutes    Gloria Weinberg MD  2023      Electronically signed by Keith Thomas MD on 2023 10:08 AM

## (undated) NOTE — ED AVS SNAPSHOT
Zana Davila   MRN: [id number]    Department:  [facility] Emergency Department   Date of Visit:  10/5/2018           Disclosure     Insurance plans vary and the physician(s) referred by the ER may not be covered by your plan.  Please contac within the next three months to obtain basic health screening including reassessment of your blood pressure.     IF THERE IS ANY CHANGE OR WORSENING OF YOUR CONDITION, CALL YOUR PRIMARY CARE PHYSICIAN AT ONCE OR RETURN IMMEDIATELY TO THE EMERGENCY DEPARTMEN

## (undated) NOTE — ED AVS SNAPSHOT
[facility] Emergency Department    [address]     [address] [place] [address]    Phone:  [phone]    Fax:  [address]   MRN: [id number]    Department:  [facility] Emergency Department   Date of Visit:  2/ and Class Registration line at [phone] or find a doctor online by visiting [email or web address].    IF THERE IS ANY CHANGE OR WORSENING OF YOUR CONDITION, CALL YOUR PRIMARY CARE PHYSICIAN AT ONCE OR RETURN IMMEDIATELY TO [address] [place] [address].     If

## (undated) NOTE — LETTER
Coronavirus Disease 2019 (COVID-19)     [facility] is committed to the safety and well-being of our patients, members, employees, and communities.  As concerns arise about the new strain of coronavirus that causes COVID-19, [facility] 4. If you have a medical appointment, call the healthcare provider ahead of time and tell them that you have or may have COVID-19.  5. For medical emergencies, call 911 and notify the dispatch personnel that you have or may have COVID-19.   6. Cover your c · At least 10 days have passed since symptoms first appeared OR if asymptomatic patient or date of symptom onset is unclear then use 10 days post COVID test date.    · At least 20 days have passed for severe illness (requiring hospitalization) OR if you are *Some people will be required to have a repeat COVID-19 test in order to be eligible to donate. If you’re instructed by Hang Harris that a repeat test is required, please contact the [facility] COVID-19 Nurse Triage Line at [phone].     Additional Inf

## (undated) NOTE — MR AVS SNAPSHOT
FREDI Cristi  [address] [address]  [phone]               Thank you for choosing us for your health care visit with Matheus Davalos MD.  We are glad to serve you and happy to provide you with this summary of your visit.   Pl Hypertension    Hypothyroid    Osteoarthritis    Peripheral polyneuropathy    Renal insufficiency    S/P CABG x 3    Skin ulceration (HCC)    Type 2 diabetes mellitus with diabetic polyneuropathy, with long-term current use of insulin (HCC)    Fatigue, un Apply 1 drop to eye as needed. Blood Gluc Meter Disp-Strips Umu   OK to use Contour test strips 3 times per day           CENTRUM SILVER Tabs   Take  by mouth.  CENTRUM SILVER (unknown strength)           DilTIAZem HCl ER Coated Beads 240 MG Cp24 Take 1 tablet (20 mEq total) by mouth daily. Commonly known as:  K-DUR M20           Pravastatin Sodium 40 MG Tabs   TAKE 1 TABLET (40MG) BY MOUTH AT BEDTIME   Commonly known as:  PRAVACHOL           vitamin B-6 50 MG Tabs   Take  by mouth.  1 tab daily

## (undated) NOTE — ED AVS SNAPSHOT
Laine Luna   MRN: [id number]    Department:  [facility] Emergency Department   Date of Visit:  9/7/2018           Disclosure     Insurance plans vary and the physician(s) referred by the ER may not be covered by your plan.  Please contact within the next three months to obtain basic health screening including reassessment of your blood pressure.     IF THERE IS ANY CHANGE OR WORSENING OF YOUR CONDITION, CALL YOUR PRIMARY CARE PHYSICIAN AT ONCE OR RETURN IMMEDIATELY TO THE EMERGENCY DEPARTMEN

## (undated) NOTE — LETTER
Hospital Discharge Documentation  Patient has transitioned to inpatient hospice level of care. From: [address] Hospitalist's Office  Phone: [phone]    Patient discharged time/date: 2023 11:58 AM  Patient discharge disposition:  [address] [facility]       Discharge Summary - D/C Summary        Discharge Summary signed by Christina Dsouza MD at 2023  3:37 PM  Version 1 of 1      Author: Christina Dsouza MD Service: Internal Medicine Author Type: Physician    Filed: 2023  3:37 PM Date of Service: 2023  3:34 PM Status: Signed    : Christina Dsouza MD (Physician)         [facility] HOSP [facility]    Discharge Summary    Jocelyn Adame Patient Status:  Inpatient    1930 MRN [id number]   Location [facility] 2W/SW Attending No att. providers found   Hosp Day # 3 PCP Twila Baer MD     Date of Admission: 2023   Date of Discharge: 2023 11:58 AM    Admitting Diagnosis: Closed fracture of left hip, initial encounter ([facility] [address] [facility]    Disposition: [facility] OF [facility] Discharge Diagnoses: Acute left hip fracture     Lace+ Score: 70  59-90 High Risk  29-58 Medium Risk  0-28   Low Risk. TCM Follow-Up Recommendation:  LACE < 29: Low Risk of readmission after discharge from the hospital. No TCM follow-up needed. Discharge Diagnosis: . Principal Problem:    Closed fracture of left hip, initial encounter [facility])  Active Problems:    Dementia with agitation ([facility] [address]    Delirium superimposed on dementia    Episodic mood disorder [facility])      Hospital Course:   Reason for Admission:   Per Dr. Alexander Pina  This is a 80year oldfemale brought in from facility due to hip pain after a fall. Apparently the patient had fallen out of bed. She has had increasing agitation over the last few days and was seen in the emergency room recently for this. It is suspected that her dementia has been advancing. Pain relatively well controlled when not moving.   Pain exacerbated by activity. Discharge Physical Exam:   Physical Exam:    General: No acute distress. Respiratory: Clear to auscultation bilaterally. No wheezes. No rhonchi. Cardiovascular: S1, S2. Regular rate and rhythm. No murmurs, rubs or gallops. Abdomen: Soft, nontender, nondistended. Positive bowel sounds. No rebound or guarding. Neurologic: No focal neurological deficits. Musculoskeletal: Moves all extremities. Hospital Course:     # Acute L hip intertrochanteric fracture   - Orthopedic surgery on consult. - s/p L femur closed reduction IM fixation 7/17.   - Pain control.   - patient is now admitted under hospice      # Atrial fibrillation with RVR, new onset.   - NSR now.   - RRT was called 7/17.   - Tele. - PO amiodarone if able. - IV diltiazem PRN   - cardiology on consult. - ECHO pending pt too agitated to complete.   - TSH ok   - CT chest no evidence of PE.   - patient is now under hospice  - supportive care     # Dementia with delirium   - Psychiatry on consult. - Haldol PRN  - zyprexa 5mg IM q8hrs PRN     # Hypernatremia  # Hypercalcemia - resolved. - change IVF to D5 100ml/hour   - rpt labs in AM.   - stop hydrochlorothiazide. # CKD III  - baseline creat 1.3-1.5   - monitor   - avoid nephrotoxic meds. # DM II  - Hba1c   - ISS      Other medical problems  H/o breast CA  GERD  HTN  Hyperlipidemia  Hypothyroidism      Chronic thrombocytopenia. Stable. H/o CAD s/p CABG x 3v     MDM: High, I personally reviewed the available laboratories, imaging including CT chest XR hip. I discussed the case with rn and cardiology. I ordered laboratories, studies including INR, AM labs, ECHO and TSH. I adjusted medications including BP meds.  Medical decision making high, risk is high       Complications: none    Consultants         Provider   Role Specialty     Bryan Charlton MD  Consulting Physician HOSPITALIST     Deanna Villela MD  Consulting Physician Psychiatry     Jignesh Chacon MD Consulting Physician HOSPITALIST     Pat Almeida MD  Consulting Physician CARDIOLOGY          Surgical Procedures       Case IDs Date Procedure Surgeon Location Status    [id number] 7/17/23 left femur intramedullary nailing Pearlie Lesch,  [facility] MA[facility] OR Comp              Discharge Plan:   Discharge Condition: Stable    Discharge Medication List as of 7/19/2023 11:58 AM    Home Meds - Unchanged    insulin glargine 100 UNIT/ML Subcutaneous Solution  Inject 18 Units into the skin nightly., Historical    DULoxetine 30 MG Oral Cap DR Particles  Take 1 capsule (30 mg total) by mouth daily. , Historical    LORazepam (ATIVAN) 0.5 MG Oral Tab  Take 1 tablet (0.5 mg total) by mouth every 6 (six) hours as needed for Anxiety., Normal, Disp-60 tablet, R-3    metFORMIN  MG Oral Tablet 24 Hr  Take 1 tablet (500 mg total) by mouth daily with breakfast., Normal, Disp-90 tablet, R-1    GLIMEPIRIDE 2 MG Oral Tab  TAKE 1 TABLET(2 MG) BY MOUTH DAILY WITH BREAKFAST, Normal, Disp-90 tablet, R-1ZERO refills remain on this prescription. Your patient is requesting advance approval of refills for this medication to [address] 10 MG Oral Tab  TAKE 1 TABLET(10 MG) BY MOUTH DAILY, Normal, Disp-90 tablet, R-1ZERO refills remain on this prescription. Your patient is requesting advance approval of refills for this medication to PREVENT ANY MISSED DOSES    levothyroxine 75 MCG Oral Tab  Take 1 tablet (75 mcg total) by mouth before breakfast., Normal, Disp-90 tablet, R-302/21/2022 1:40:32 PM    DILTIAZEM  MG Oral Capsule SR 24 Hr  TAKE 1 CAPSULE(240MG) BY MOUTH DAILY., Normal, Disp-90 capsule, R-3    PRAVASTATIN 40 MG Oral Tab  TAKE 1 TABLET(40 MG TOTAL) BY MOUTH NIGHTLY., Normal, Disp-90 tablet, R-3ZERO refills remain on this prescription.  Your patient is requesting advance approval of refills for this medication to PREVENT ANY MISSED DOSES    amLODIPine 5 MG Oral Tab  Take 1 tablet (5 mg total) by mouth in the morning., Normal, Disp-90 tablet, R-3    lisinopril-hydroCHLOROthiazide 20-25 MG Oral Tab  Take 1 tablet by mouth 2 (two) times a day., Normal, Disp-180 tablet, R-309/13/2021 11:44:34 AM    Insulin Pen Needle (BD PEN NEEDLE SHADE U/F) 32G X 4 MM Does not apply Misc  USE AS DIRECTED, Normal, Disp-100 each, R-3DX E11.42 type 2 DM with insulin use    risedronate (ACTONEL) 35 MG Oral Tab  Take 1 tablet (35 mg total) by mouth every 7 days. , Normal, Disp-12 tablet, R-3    docusate sodium 250 MG Oral Cap  Take 1 capsule (250 mg total) by mouth daily. , Historical    cetirizine 10 MG Oral Tab  Take 1 tablet (10 mg total) by mouth daily. , Historical    OYSTER SHELL CALCIUM/D 500-200 MG-UNIT Oral Tab  TAKE 1 TABLET BY MOUTH DAILY., Normal, Disp-90 tablet, R-309/13/2021 11:44:47 AM* * N O T I C E * * Last quantity doesn't match original quantity    Cholecalciferol (VITAMIN D) 1000 units Oral Tab  1000 units   Sig-  1000units orally daily. , Print Script, Disp-100 tablet, R-3    Glucose Blood (CHINMAY CONTOUR NEXT TEST VI)  TEST BLOOD SUGAR THREE TIMES DAILY AS DIRECTED, Historical, R-99    aspirin 81 MG Oral Chew Tab  Chew 1 tablet (81 mg total) by mouth daily. , Normal, Disp-90 tablet, R-0    Blood Gluc Meter Disp-Strips Does not apply Device  OK to use Contour test strips 3 times per day, Normal, Disp-300 Device, R-3Match strips to pt device Dx: 250.00 - insulin using    Lancets Does not apply Misc  Test three times daily as directed by chris., Normal, Disp-300 each, R-3Match lancet to pt device Dx: 250.00 - insulin using    Multiple Vitamins-Minerals (CENTRUM SILVER) Oral Tab  Take 1 tablet by mouth daily. CENTRUM SILVER (unknown strength) , Historical    Pyridoxine HCl (VITAMIN B-6) 50 MG Oral Tab  Take  by mouth.  1 tab daily, Historical              Discharge Diet: none     Discharge Activity: As tolerated       Discharge Medications        CONTINUE taking these medications        Instructions Prescription details   BD Pen Needle Adilia U/F 32G X 4 MM Misc  Generic drug: Insulin Pen Needle      USE AS DIRECTED   Quantity: 100 each  Refills: 3     Blood Gluc Meter Disp-Strips Umu      OK to use Contour test strips 3 times per day   Quantity: 300 Device  Refills: 3     Lancets Misc      Test three times daily as directed by chris. Quantity: 300 each  Refills: 3            STOP taking these medications      amLODIPine 5 MG Tabs  Commonly known as: Norvasc        aspirin 81 MG Chew        CHINMAY CONTOUR NEXT TEST VI        Centrum Silver Tabs        cetirizine 10 MG Tabs  Commonly known as: ZyrTEC        dilTIAZem  MG Cp24  Commonly known as: CardIZEM CD        docusate sodium 250 MG Caps  Commonly known as: COLACE        DULoxetine 30 MG Cpep  Commonly known as: Cymbalta        glimepiride 2 MG Tabs  Commonly known as: Amaryl        insulin glargine 100 UNIT/ML Soln  Commonly known as: Lantus        Jardiance 10 MG Tabs  Generic drug: empagliflozin        levothyroxine 75 MCG Tabs  Commonly known as: Synthroid        lisinopril-hydroCHLOROthiazide 20-25 MG Tabs  Commonly known as: Zestoretic        LORazepam 0.5 MG Tabs  Commonly known as: Ativan        metFORMIN  MG Tb24  Commonly known as: Glucophage XR        Oyster Shell Calcium/D 500-200 MG-UNIT Tabs        pravastatin 40 MG Tabs  Commonly known as: Pravachol        pyridoxine 50 MG Tabs  Commonly known as: Vitamin B6        risedronate 35 MG Tabs  Commonly known as: Actonel        Vitamin D 1000 units Tabs                 Follow up:        Follow up Labs and imaging:         Time spent:  > 30 minutes    Rosalva Quijano MD  7/19/2023      Electronically signed by Arlo Dakin, MD on 7/19/2023  3:37 PM

## (undated) NOTE — LETTER
[address]  Authorization for Surgical Operation and Procedure                                                                                           I hereby authorize * Open reduction internal fixation left hip with Dr. Deirdre Velazquez*, my physician and his/her assistants (if applicable), which may include medical students, residents, and/or fellows, to perform the following surgical operation/ procedure and administer such anesthesia as may be determined necessary by my physician: Operation/Procedure name (s)  on Christian Prophet   2. I recognize that during the surgical operation/procedure, unforeseen conditions may necessitate additional or different procedures than those listed above. I, therefore, further authorize and request that the above-named surgeon, assistants, or designees perform such procedures as are, in their judgment, necessary and desirable. 3.   My surgeon/physician has discussed prior to my surgery the potential benefits, risks and side effects of this procedure; the likelihood of achieving goals; and potential problems that might occur during recuperation. They also discussed reasonable alternatives to the procedure, including risks, benefits, and side effects related to the alternatives and risks related to not receiving this procedure. I have had all my questions answered and I acknowledge that no guarantee has been made as to the result that may be obtained. 4.   Should the need arise during my operation/procedure, which includes change of level of care prior to discharge, I also consent to the administration of blood and/or blood products. Further, I understand that despite careful testing and screening of blood or blood products by collecting agencies, I may still be subject to ill effects as a result of receiving a blood transfusion and/or blood products.   The following are some, but not all, of the potential risks that can occur: fever and allergic reactions, hemolytic reactions, transmission of diseases such as Hepatitis, AIDS and Cytomegalovirus (CMV) and fluid overload. In the event that I wish to have an autologous transfusion of my own blood, or a directed donor transfusion, I will discuss this with my physician. Check only if Refusing Blood or Blood Products  I understand refusal of blood or blood products as deemed necessary by my physician may have serious consequences to my condition to include possible death. I hereby assume responsibility for my refusal and release the hospital, its personnel, and my physicians from any responsibility for the consequences of my refusal.    o  Refuse   5. I authorize the use of any specimen, organs, tissues, body parts or foreign objects that may be removed from my body during the operation/procedure for diagnosis, research or teaching purposes and their subsequent disposal by hospital authorities. I also authorize the release of specimen test results and/or written reports to my treating physician on the hospital medical staff or other referring or consulting physicians involved in my care, at the discretion of the Pathologist or my treating physician. 6.   I consent to the photographing or videotaping of the operations or procedures to be performed, including appropriate portions of my body for medical, scientific, or educational purposes, provided my identity is not revealed by the pictures or by descriptive texts accompanying them. If the procedure has been photographed/videotaped, the surgeon will obtain the original picture, image, videotape or CD. The hospital will not be responsible for storage, release or maintenance of the picture, image, tape or CD.    7.   I consent to the presence of a  or observers in the operating room as deemed necessary by my physician or their designees.     8.   I recognize that in the event my procedure results in extended X-Ray/fluoroscopy time, I may develop a skin reaction. 9. If I have a Do Not Attempt Resuscitation (DNAR) order in place, that status will be suspended while in the operating room, procedural suite, and during the recovery period unless otherwise explicitly stated by me (or a person authorized to consent on my behalf). The surgeon or my attending physician will determine when the applicable recovery period ends for purposes of reinstating the DNAR order. 10. Patients having a sterilization procedure: I understand that if the procedure is successful the results will be permanent and it will therefore be impossible for me to inseminate, conceive, or bear children. I also understand that the procedure is intended to result in sterility, although the result has not been guaranteed. 11. I acknowledge that my physician has explained sedation/analgesia administration to me including the risk and benefits I consent to the administration of sedation/analgesia as may be necessary or desirable in the judgment of my physician. I CERTIFY THAT I HAVE READ AND FULLY UNDERSTAND THE ABOVE CONSENT TO OPERATION and/or OTHER PROCEDURE.     _________________________________________ _________________________________     ___________________________________  Signature of Patient     Signature of Responsible Person                   Printed Name of Responsible Person                              _________________________________________ ______________________________        ___________________________________  Signature of Witness         Date  Time         Relationship to Patient    STATEMENT OF PHYSICIAN My signature below affirms that prior to the time of the procedure; I have explained to the patient and/or his/her legal representative, the risks and benefits involved in the proposed treatment and any reasonable alternative to the proposed treatment.  I have also explained the risks and benefits involved in refusal of the proposed treatment and alternatives to the proposed treatment and have answered the patient's questions.  If I have a significant financial interest in a co-management agreement or a significant financial interest in any product or implant, or other significant relationship used in this procedure/surgery, I have disclosed this and had a discussion with my patient.     _______________________________________________________________ _____________________________  Vadim Durant Physician)                                                                                         (Date)                                   (Time)  Patient Name: Se Andrews    : 1930   Printed: 2023      Medical Record #: [id number]                                              Page 1 of 1

## (undated) NOTE — MR AVS SNAPSHOT
[address] [address]  [phone]               Thank you for choosing us for your health care visit with Nohemy Villavicencio MD.  We are glad to serve you and happy to provide you with this summary of your visit.   Pl 1.  Patient is to continue her current diet, medications and activity. 2.  Patient is to follow-up with her cardiologist, Dr. Tonie Mckenzie.   3.  I will plan to see the patient back in about 1 month with blood tests which will include a CBC, BMP, and hemoglobin TAKE 1 TABLET (50MCG) BY MOUTH EVERY DAY   Commonly known as:  SYNTHROID           lisinopril 20 MG Tabs   Take 1 tablet by mouth daily.    Commonly known as:  PRINIVIL,ZESTRIL           MIRALAX OR   Take 17 g by mouth daily as needed (as needed for constip

## (undated) NOTE — MR AVS SNAPSHOT
[address] [facility] [address]  [phone]               Thank you for choosing us for your health care visit with Henry Morley MD.  We are glad to serve you and happy to provide you with this summary of your visit.   Pl Osteoarthritis    Renal insufficiency    S/P CABG x 3    Type 2 diabetes mellitus with diabetic polyneuropathy, with long-term current use of insulin    Fatigue, unspecified type          Instructions and Information about Your Health    1.   Patient is to Insulin Pen Needle 32G X 4 MM Misc   USE AS DIRECTED   Commonly known as:  BD PEN NEEDLE SHADE U/F           Lancets Misc   Test three times daily as directed by chris.            LANTUS SOLOSTAR 100 UNIT/ML Sopn   Generic drug:  Insulin Glargine   INJEC

## (undated) NOTE — ED AVS SNAPSHOT
Hernandez Barrett   MRN: [id number]    Department:  [facility] Emergency Department   Date of Visit:  7/3/2019           Disclosure     Insurance plans vary and the physician(s) referred by the ER may not be covered by your plan.  Please contact within the next three months to obtain basic health screening including reassessment of your blood pressure.     IF THERE IS ANY CHANGE OR WORSENING OF YOUR CONDITION, CALL YOUR PRIMARY CARE PHYSICIAN AT ONCE OR RETURN IMMEDIATELY TO THE EMERGENCY DEPARTMEN

## (undated) NOTE — MR AVS SNAPSHOT
[facility] [address] [address]  [phone]               Thank you for choosing us for your health care visit with Maria Dolores Santana MD.  We are glad to serve you and happy to provide you with this summary of your visit.   Pl Current Medications          This list is accurate as of: 4/10/17 10:51 AM.  Always use your most recent med list.                ascorbic acid 500 MG Tabs   Take 1 tablet (500 mg total) by mouth 3 (three) times daily.    Commonly known as:  VITAMIN C Take 17 g by mouth daily as needed (as needed for constipation). nystatin-triamcinolone 174745-5.1 UNIT/GM-% Crea   30 grams   Sig-Apply to area of perirectal rash BID PRN.    Commonly known as:  MYCOLOG II           omeprazole 20 MG Cpdr   Take 1